# Patient Record
Sex: FEMALE | Race: OTHER | Employment: UNEMPLOYED | URBAN - NONMETROPOLITAN AREA
[De-identification: names, ages, dates, MRNs, and addresses within clinical notes are randomized per-mention and may not be internally consistent; named-entity substitution may affect disease eponyms.]

---

## 2024-10-23 ENCOUNTER — APPOINTMENT (OUTPATIENT)
Dept: CT IMAGING | Age: 54
DRG: 100 | End: 2024-10-23
Payer: COMMERCIAL

## 2024-10-23 ENCOUNTER — APPOINTMENT (OUTPATIENT)
Dept: GENERAL RADIOLOGY | Age: 54
DRG: 100 | End: 2024-10-23
Payer: COMMERCIAL

## 2024-10-23 ENCOUNTER — HOSPITAL ENCOUNTER (INPATIENT)
Age: 54
LOS: 9 days | Discharge: ANOTHER HOSPITAL | DRG: 100 | End: 2024-11-01
Attending: EMERGENCY MEDICINE | Admitting: INTERNAL MEDICINE
Payer: COMMERCIAL

## 2024-10-23 DIAGNOSIS — R09.2 RESPIRATORY ARREST (HCC): ICD-10-CM

## 2024-10-23 DIAGNOSIS — N18.6 ESRF (END STAGE RENAL FAILURE) (HCC): Primary | ICD-10-CM

## 2024-10-23 LAB
ALBUMIN SERPL BCG-MCNC: 2.9 G/DL (ref 3.5–5.1)
ALP SERPL-CCNC: 168 U/L (ref 38–126)
ALT SERPL W/O P-5'-P-CCNC: 20 U/L (ref 11–66)
ANION GAP SERPL CALC-SCNC: 15 MEQ/L (ref 8–16)
APTT PPP: 30.4 SECONDS (ref 22–38)
AST SERPL-CCNC: 34 U/L (ref 5–40)
BASOPHILS ABSOLUTE: 0.1 THOU/MM3 (ref 0–0.1)
BASOPHILS NFR BLD AUTO: 0.7 %
BILIRUB CONJ SERPL-MCNC: < 0.1 MG/DL (ref 0.1–13.8)
BILIRUB SERPL-MCNC: 0.3 MG/DL (ref 0.3–1.2)
BUN SERPL-MCNC: 78 MG/DL (ref 7–22)
CA-I BLD ISE-SCNC: 1.1 MMOL/L (ref 1.12–1.32)
CALCIUM SERPL-MCNC: 8.1 MG/DL (ref 8.5–10.5)
CHLORIDE SERPL-SCNC: 104 MEQ/L (ref 98–111)
CO2 SERPL-SCNC: 22 MEQ/L (ref 23–33)
CREAT SERPL-MCNC: 4.2 MG/DL (ref 0.4–1.2)
DEPRECATED RDW RBC AUTO: 51.5 FL (ref 35–45)
EOSINOPHIL NFR BLD AUTO: 4.2 %
EOSINOPHILS ABSOLUTE: 0.3 THOU/MM3 (ref 0–0.4)
ERYTHROCYTE [DISTWIDTH] IN BLOOD BY AUTOMATED COUNT: 15.9 % (ref 11.5–14.5)
GFR SERPL CREATININE-BSD FRML MDRD: 12 ML/MIN/1.73M2
GLUCOSE BLD STRIP.AUTO-MCNC: 89 MG/DL (ref 70–108)
GLUCOSE SERPL-MCNC: 215 MG/DL (ref 70–108)
HCT VFR BLD AUTO: 29.2 % (ref 37–47)
HGB BLD-MCNC: 9 GM/DL (ref 12–16)
IMM GRANULOCYTES # BLD AUTO: 0.02 THOU/MM3 (ref 0–0.07)
IMM GRANULOCYTES NFR BLD AUTO: 0.3 %
INR PPP: 1.46 (ref 0.85–1.13)
LACTATE SERPL-SCNC: 0.9 MMOL/L (ref 0.5–2)
LYMPHOCYTES ABSOLUTE: 1.6 THOU/MM3 (ref 1–4.8)
LYMPHOCYTES NFR BLD AUTO: 20.2 %
MAGNESIUM SERPL-MCNC: 1.9 MG/DL (ref 1.6–2.4)
MCH RBC QN AUTO: 27.4 PG (ref 26–33)
MCHC RBC AUTO-ENTMCNC: 30.8 GM/DL (ref 32.2–35.5)
MCV RBC AUTO: 88.8 FL (ref 81–99)
MONOCYTES ABSOLUTE: 0.8 THOU/MM3 (ref 0.4–1.3)
MONOCYTES NFR BLD AUTO: 11 %
NEUTROPHILS ABSOLUTE: 4.9 THOU/MM3 (ref 1.8–7.7)
NEUTROPHILS NFR BLD AUTO: 63.6 %
NRBC BLD AUTO-RTO: 0 /100 WBC
OSMOLALITY SERPL CALC.SUM OF ELEC: 311.1 MOSMOL/KG (ref 275–300)
OSMOLALITY SERPL: NORMAL MOSMOL/KG (ref 275–295)
PLATELET # BLD AUTO: 298 THOU/MM3 (ref 130–400)
PMV BLD AUTO: 10.5 FL (ref 9.4–12.4)
POTASSIUM SERPL-SCNC: 4.5 MEQ/L (ref 3.5–5.2)
PROT SERPL-MCNC: 6.3 G/DL (ref 6.1–8)
RBC # BLD AUTO: 3.29 MILL/MM3 (ref 4.2–5.4)
SODIUM SERPL-SCNC: 141 MEQ/L (ref 135–145)
TROPONIN, HIGH SENSITIVITY: 175 NG/L (ref 0–12)
WBC # BLD AUTO: 7.7 THOU/MM3 (ref 4.8–10.8)

## 2024-10-23 PROCEDURE — 6360000002 HC RX W HCPCS

## 2024-10-23 PROCEDURE — 99254 IP/OBS CNSLTJ NEW/EST MOD 60: CPT | Performed by: INTERNAL MEDICINE

## 2024-10-23 PROCEDURE — 80048 BASIC METABOLIC PNL TOTAL CA: CPT

## 2024-10-23 PROCEDURE — 5A1D70Z PERFORMANCE OF URINARY FILTRATION, INTERMITTENT, LESS THAN 6 HOURS PER DAY: ICD-10-PCS

## 2024-10-23 PROCEDURE — 36415 COLL VENOUS BLD VENIPUNCTURE: CPT

## 2024-10-23 PROCEDURE — 85610 PROTHROMBIN TIME: CPT

## 2024-10-23 PROCEDURE — 90935 HEMODIALYSIS ONE EVALUATION: CPT

## 2024-10-23 PROCEDURE — XX20X89 MONITORING OF BRAIN ELECTRICAL ACTIVITY, COMPUTER-AIDED DETECTION AND NOTIFICATION, NEW TECHNOLOGY GROUP 9: ICD-10-PCS

## 2024-10-23 PROCEDURE — 84484 ASSAY OF TROPONIN QUANT: CPT

## 2024-10-23 PROCEDURE — 94002 VENT MGMT INPAT INIT DAY: CPT

## 2024-10-23 PROCEDURE — 83605 ASSAY OF LACTIC ACID: CPT

## 2024-10-23 PROCEDURE — 6370000000 HC RX 637 (ALT 250 FOR IP)

## 2024-10-23 PROCEDURE — 99285 EMERGENCY DEPT VISIT HI MDM: CPT

## 2024-10-23 PROCEDURE — 2000000000 HC ICU R&B

## 2024-10-23 PROCEDURE — 31500 INSERT EMERGENCY AIRWAY: CPT | Performed by: NURSE PRACTITIONER

## 2024-10-23 PROCEDURE — 71045 X-RAY EXAM CHEST 1 VIEW: CPT

## 2024-10-23 PROCEDURE — 82330 ASSAY OF CALCIUM: CPT

## 2024-10-23 PROCEDURE — 0BH17EZ INSERTION OF ENDOTRACHEAL AIRWAY INTO TRACHEA, VIA NATURAL OR ARTIFICIAL OPENING: ICD-10-PCS | Performed by: INTERNAL MEDICINE

## 2024-10-23 PROCEDURE — 87641 MR-STAPH DNA AMP PROBE: CPT

## 2024-10-23 PROCEDURE — 99291 CRITICAL CARE FIRST HOUR: CPT | Performed by: INTERNAL MEDICINE

## 2024-10-23 PROCEDURE — 6360000002 HC RX W HCPCS: Performed by: NURSE PRACTITIONER

## 2024-10-23 PROCEDURE — 80076 HEPATIC FUNCTION PANEL: CPT

## 2024-10-23 PROCEDURE — 83735 ASSAY OF MAGNESIUM: CPT

## 2024-10-23 PROCEDURE — 85025 COMPLETE CBC W/AUTO DIFF WBC: CPT

## 2024-10-23 PROCEDURE — 83930 ASSAY OF BLOOD OSMOLALITY: CPT

## 2024-10-23 PROCEDURE — 85730 THROMBOPLASTIN TIME PARTIAL: CPT

## 2024-10-23 PROCEDURE — 70450 CT HEAD/BRAIN W/O DYE: CPT

## 2024-10-23 PROCEDURE — 82948 REAGENT STRIP/BLOOD GLUCOSE: CPT

## 2024-10-23 PROCEDURE — 5A1945Z RESPIRATORY VENTILATION, 24-96 CONSECUTIVE HOURS: ICD-10-PCS | Performed by: INTERNAL MEDICINE

## 2024-10-23 PROCEDURE — 2580000003 HC RX 258: Performed by: NURSE PRACTITIONER

## 2024-10-23 RX ORDER — PROMETHAZINE HYDROCHLORIDE 25 MG/1
12.5 TABLET ORAL EVERY 6 HOURS PRN
Status: DISCONTINUED | OUTPATIENT
Start: 2024-10-23 | End: 2024-11-01 | Stop reason: HOSPADM

## 2024-10-23 RX ORDER — ATORVASTATIN CALCIUM 40 MG/1
40 TABLET, FILM COATED ORAL EVERY EVENING
Status: DISCONTINUED | OUTPATIENT
Start: 2024-10-23 | End: 2024-11-01 | Stop reason: HOSPADM

## 2024-10-23 RX ORDER — ACETAMINOPHEN 325 MG/1
650 TABLET ORAL EVERY 6 HOURS PRN
Status: DISCONTINUED | OUTPATIENT
Start: 2024-10-23 | End: 2024-11-01 | Stop reason: HOSPADM

## 2024-10-23 RX ORDER — SODIUM CHLORIDE 9 MG/ML
INJECTION, SOLUTION INTRAVENOUS PRN
Status: DISCONTINUED | OUTPATIENT
Start: 2024-10-23 | End: 2024-11-01 | Stop reason: HOSPADM

## 2024-10-23 RX ORDER — METOLAZONE 2.5 MG/1
2.5 TABLET ORAL DAILY
Status: DISCONTINUED | OUTPATIENT
Start: 2024-10-23 | End: 2024-11-01 | Stop reason: HOSPADM

## 2024-10-23 RX ORDER — ASPIRIN 81 MG/1
81 TABLET ORAL DAILY
Status: DISCONTINUED | OUTPATIENT
Start: 2024-10-23 | End: 2024-11-01 | Stop reason: HOSPADM

## 2024-10-23 RX ORDER — SODIUM CHLORIDE 0.9 % (FLUSH) 0.9 %
5-40 SYRINGE (ML) INJECTION EVERY 12 HOURS SCHEDULED
Status: DISCONTINUED | OUTPATIENT
Start: 2024-10-23 | End: 2024-11-01 | Stop reason: HOSPADM

## 2024-10-23 RX ORDER — HYDRALAZINE HYDROCHLORIDE 20 MG/ML
5 INJECTION INTRAMUSCULAR; INTRAVENOUS EVERY 6 HOURS PRN
Status: DISCONTINUED | OUTPATIENT
Start: 2024-10-23 | End: 2024-10-28

## 2024-10-23 RX ORDER — BUMETANIDE 1 MG/1
1 TABLET ORAL DAILY
Status: DISCONTINUED | OUTPATIENT
Start: 2024-10-23 | End: 2024-10-24

## 2024-10-23 RX ORDER — ACETAMINOPHEN 650 MG/1
650 SUPPOSITORY RECTAL EVERY 6 HOURS PRN
Status: DISCONTINUED | OUTPATIENT
Start: 2024-10-23 | End: 2024-11-01 | Stop reason: HOSPADM

## 2024-10-23 RX ORDER — POLYETHYLENE GLYCOL 3350 17 G/17G
17 POWDER, FOR SOLUTION ORAL DAILY PRN
Status: DISCONTINUED | OUTPATIENT
Start: 2024-10-23 | End: 2024-11-01 | Stop reason: HOSPADM

## 2024-10-23 RX ORDER — SODIUM CHLORIDE 0.9 % (FLUSH) 0.9 %
5-40 SYRINGE (ML) INJECTION PRN
Status: DISCONTINUED | OUTPATIENT
Start: 2024-10-23 | End: 2024-11-01 | Stop reason: HOSPADM

## 2024-10-23 RX ORDER — CLOPIDOGREL BISULFATE 75 MG/1
75 TABLET ORAL DAILY
Status: DISCONTINUED | OUTPATIENT
Start: 2024-10-23 | End: 2024-11-01 | Stop reason: HOSPADM

## 2024-10-23 RX ORDER — GLUCAGON 1 MG/ML
1 KIT INJECTION PRN
Status: DISCONTINUED | OUTPATIENT
Start: 2024-10-23 | End: 2024-11-01 | Stop reason: HOSPADM

## 2024-10-23 RX ORDER — SEVELAMER CARBONATE 800 MG/1
800 TABLET, FILM COATED ORAL
Status: DISCONTINUED | OUTPATIENT
Start: 2024-10-23 | End: 2024-11-01 | Stop reason: HOSPADM

## 2024-10-23 RX ORDER — LOSARTAN POTASSIUM 100 MG/1
100 TABLET ORAL DAILY
Status: DISCONTINUED | OUTPATIENT
Start: 2024-10-23 | End: 2024-11-01 | Stop reason: HOSPADM

## 2024-10-23 RX ORDER — ONDANSETRON 2 MG/ML
4 INJECTION INTRAMUSCULAR; INTRAVENOUS EVERY 6 HOURS PRN
Status: DISCONTINUED | OUTPATIENT
Start: 2024-10-23 | End: 2024-11-01 | Stop reason: HOSPADM

## 2024-10-23 RX ORDER — INSULIN LISPRO 100 [IU]/ML
0-4 INJECTION, SOLUTION INTRAVENOUS; SUBCUTANEOUS
Status: DISCONTINUED | OUTPATIENT
Start: 2024-10-23 | End: 2024-10-24 | Stop reason: DRUGHIGH

## 2024-10-23 RX ORDER — DEXTROSE MONOHYDRATE 100 MG/ML
INJECTION, SOLUTION INTRAVENOUS CONTINUOUS PRN
Status: DISCONTINUED | OUTPATIENT
Start: 2024-10-23 | End: 2024-10-24 | Stop reason: SDUPTHER

## 2024-10-23 RX ORDER — CARVEDILOL 25 MG/1
25 TABLET ORAL 2 TIMES DAILY WITH MEALS
Status: DISCONTINUED | OUTPATIENT
Start: 2024-10-23 | End: 2024-10-29

## 2024-10-23 RX ORDER — FOLIC ACID 1 MG/1
1 TABLET ORAL DAILY
Status: DISCONTINUED | OUTPATIENT
Start: 2024-10-23 | End: 2024-11-01 | Stop reason: HOSPADM

## 2024-10-23 RX ORDER — HYDRALAZINE HYDROCHLORIDE 25 MG/1
25 TABLET, FILM COATED ORAL 3 TIMES DAILY
Status: DISCONTINUED | OUTPATIENT
Start: 2024-10-23 | End: 2024-10-28

## 2024-10-23 RX ORDER — PROPOFOL 10 MG/ML
5-50 INJECTION, EMULSION INTRAVENOUS CONTINUOUS
Status: DISCONTINUED | OUTPATIENT
Start: 2024-10-23 | End: 2024-10-26

## 2024-10-23 RX ADMIN — SODIUM CHLORIDE 5 MG/HR: 9 INJECTION, SOLUTION INTRAVENOUS at 17:04

## 2024-10-23 RX ADMIN — BUMETANIDE 1 MG: 1 TABLET ORAL at 21:56

## 2024-10-23 RX ADMIN — PROPOFOL 45 MCG/KG/MIN: 10 INJECTION, EMULSION INTRAVENOUS at 19:18

## 2024-10-23 RX ADMIN — PROPOFOL 50 MCG/KG/MIN: 10 INJECTION, EMULSION INTRAVENOUS at 16:37

## 2024-10-23 RX ADMIN — ATORVASTATIN CALCIUM 40 MG: 40 TABLET, FILM COATED ORAL at 21:57

## 2024-10-23 RX ADMIN — PROPOFOL 50 MCG/KG/MIN: 10 INJECTION, EMULSION INTRAVENOUS at 22:58

## 2024-10-23 ASSESSMENT — PAIN - FUNCTIONAL ASSESSMENT: PAIN_FUNCTIONAL_ASSESSMENT: NONE - DENIES PAIN

## 2024-10-23 ASSESSMENT — PULMONARY FUNCTION TESTS: PIF_VALUE: 20

## 2024-10-23 NOTE — ED PROVIDER NOTES
acetaminophen (TYLENOL) suppository 650 mg (has no administration in time range)   promethazine (PHENERGAN) tablet 12.5 mg (has no administration in time range)     Or   ondansetron (ZOFRAN) injection 4 mg (has no administration in time range)   apixaban (ELIQUIS) tablet 5 mg ( Oral Automatically Held 10/26/24 2100)   aspirin EC tablet 81 mg ( Oral Automatically Held 10/26/24 0900)   atorvastatin (LIPITOR) tablet 40 mg (has no administration in time range)   bumetanide (BUMEX) tablet 1 mg (has no administration in time range)   carvedilol (COREG) tablet 25 mg ( Oral Automatically Held 10/26/24 1700)   clopidogrel (PLAVIX) tablet 75 mg ( Oral Automatically Held 10/26/24 0900)   folic acid (FOLVITE) tablet 1 mg ( Oral Automatically Held 10/26/24 0900)   hydrALAZINE (APRESOLINE) tablet 25 mg ( Oral Automatically Held 10/26/24 2100)   losartan (COZAAR) tablet 100 mg ( Oral Automatically Held 10/26/24 0900)   metOLazone (ZAROXOLYN) tablet 2.5 mg ( Oral Automatically Held 10/26/24 0900)   sertraline (ZOLOFT) tablet 50 mg ( Oral Automatically Held 10/26/24 1800)   sevelamer (RENVELA) tablet 800 mg ( Oral Automatically Held 10/26/24 1700)   insulin lispro (HUMALOG,ADMELOG) injection vial 0-4 Units (has no administration in time range)   glucose chewable tablet 16 g (has no administration in time range)   dextrose bolus 10% 125 mL (has no administration in time range)     Or   dextrose bolus 10% 250 mL (has no administration in time range)   glucagon injection 1 mg (has no administration in time range)   dextrose 10 % infusion (has no administration in time range)   hydrALAZINE (APRESOLINE) injection 5 mg (has no administration in time range)     1630:  Patient was a rapid response upstairs in the dialysis unit.  She apparently had a seizure and then bit her tongue which bled profusely.  Patient was resuscitated and admitted to the ICU, and did not return to the emergency room.  She was admitted to the hospital from the

## 2024-10-23 NOTE — FLOWSHEET NOTE
10/23/24 1545   Vital Signs   BP (!) 197/90   Pulse 64   Post-Hemodialysis Assessment   Post-Treatment Procedures Blood returned;Catheter Capped, clamped with Saline x2 ports   Machine Disinfection Process Acid/Vinegar Clean;Heat Disinfect;Exterior Machine Disinfection   Rinseback Volume (ml) 200 ml   Blood Volume Processed (Liters) 31.3 L   Dialyzer Clearance Lightly streaked   Duration of Treatment (minutes) 120 minutes   Heparin Amount Administered During Treatment (mL) 0 mL   Hemodialysis Intake (ml) 200 ml   Hemodialysis Output (ml) 1850 ml   NET Removed (ml) 1650     rapid response called 2 hours into 3.5 hour treatment due to seizure, pt intubated and brought to icu per rapid team. Dr. Osborn informed. venous blood was not able to be returned, during seizure it would not return. HD catheter ports flushed, clamped and capped. Dressing clean, dry and intact. Report given to primary RN. Treatment record printed for scanning into EMR.

## 2024-10-23 NOTE — PROCEDURES
PROCEDURE NOTE  Date: 10/23/2024   Name: Shahrzad Gregory  YOB: 1970    Intubation    Date/Time: 10/23/2024 6:24 PM    Performed by: Darion Orantes DO  Authorized by: Darion Orantes DO  Consent: The procedure was performed in an emergent situation. Verbal consent not obtained. Written consent not obtained.  Patient identity confirmed: verbally with patient and arm band  Indications: airway protection, respiratory failure and hypoxemia  Intubation method: video-assisted  Patient status: paralyzed (RSI)  Preoxygenation: BVM  Pretreatment medications: midazolam  Sedatives: etomidate  Paralytic: rocuronium  Laryngoscope size: Mac 3  Tube size: 7.0 mm  Tube type: cuffed  Number of attempts: 3  Ventilation between attempts: BVM  Cricoid pressure: no  Cords visualized: yes  Post-procedure assessment: chest rise and ETCO2 monitor  Breath sounds: equal  Cuff inflated: yes  ETT to lip: 25 cm  Tube secured with: ETT campos  Chest x-ray interpreted by me and radiologist.  Chest x-ray findings: endotracheal tube too low  Tube repositioned: tube repositioned successfully  Comments:   Procedure was performed emergently during a rapid response situation at inpatient dialysis.  The intubation was performed with More Hayes CNP present, and assisting during the intubation. The first attempt visualized cords well, however stylet had inappropriate curvature. Stylet was exchanged. Subsequent attempts were hindered by bloody secretions obscuring the view of the larynx and epiglottis. In addition, the patient intermittently was biting on the glidescope, further hindering the second attempt. On the third attempt, the ET tube was placed by More. It was 25 cm at the lip. Stat CXR showed placement approx 2cm from the angus. The ET tube was repositioned to 22 cm at the lip, with repositioning verified by additional CXR.    Electronically signed by Darion Orantes DO IM PGY-2 on 10/23/2024.  This procedure was

## 2024-10-23 NOTE — CONSULTS
propofol 50 mcg/kg/min (10/23/24 1637)    niCARdipine 5 mg/hr (10/23/24 1704)     Meds:    epoetin george-epbx  6,000 Units IntraVENous Once in dialysis     Meds prn:      Allergies/Intolerances:  ALLERGIES: Patient has no known allergies.    24HR INTAKE/OUTPUT:    Intake/Output Summary (Last 24 hours) at 10/23/2024 1750  Last data filed at 10/23/2024 1545  Gross per 24 hour   Intake 200 ml   Output 1850 ml   Net -1650 ml     No intake/output data recorded.  I/O this shift:  In: 200   Out: 1850   Admission weight: 85.8 kg (189 lb 2.5 oz)  Wt Readings from Last 3 Encounters:   10/23/24 85.8 kg (189 lb 2.5 oz)   10/21/24 86.7 kg (191 lb 2.2 oz)   10/18/24 85 kg (187 lb 6.3 oz)     Body mass index is 34.6 kg/m².    Physical Examination:  VITALS:   Vitals:    10/23/24 1645 10/23/24 1700 10/23/24 1715 10/23/24 1730   BP: (!) 191/60 (!) 181/60 (!) 178/59 (!) 163/58   Pulse: 56 55 55 55   Resp: 16 13 15 15   Temp:       TempSrc:       SpO2: 100% 100% 100% 100%   Weight:         Weight:   Wt Readings from Last 3 Encounters:   10/23/24 85.8 kg (189 lb 2.5 oz)   10/21/24 86.7 kg (191 lb 2.2 oz)   10/18/24 85 kg (187 lb 6.3 oz)     Constitutional and General Appearance: Intubated and sedated  Eyes: no icteric sclera in left eye or right eye,  no pallor conjunctiva in left or right eye  Ears and Nose:  No active drainage from nose.   Oral: + ET tube  Neck: No jugular venous distention  Lungs: Air entry B/L, on mechanical ventilator  Heart: regular rate, S1, S2  Extremities: + LE edema, no tenderness  GI: soft  Skin: warm to touch  Neuro: Sedated    Lab Data  CBC:   Recent Labs     10/21/24  0647 10/21/24  1136 10/23/24  1254   WBC 6.9  --  7.7   HGB 8.3* 8.0* 9.0*   HCT 27.1* 25.9* 29.2*     --  298     BMP:  Recent Labs     10/20/24  1809 10/21/24  0647 10/23/24  1254    138 141   K 4.4 4.6 4.5   CL 99 99 104   CO2 19* 21* 22*   BUN 96* 102* 78*   CREATININE 5.1* 5.4* 4.2*   GLUCOSE 258* 40* 215*   CALCIUM 7.3*  7.6* 8.1*     Hepatic: No results for input(s): \"LABALBU\", \"AST\", \"ALT\", \"BILITOT\", \"ALKPHOS\" in the last 72 hours.    Invalid input(s): \"ALB\"        Diagnostics:  EF 30%    Chest x-ray shows cardiomegaly and pleural effusion      Impression and Plan:  ESRD on hemodialysis  Patient underwent 2 hours of dialysis treatment but was terminated due to seizure activity  Her BUN was 78 and infact much better than previous levels.  Seizure activity not related to dialysis or DDS  Reevaluate in a.m. and possibly plan for dialysis treatment tomorrow  BMP daily  Electrolytes.  Stable  Acid-base.  Overall stable.  Has mild metabolic acidosis  Seizure.  Anemia in ESRD  Hypertension.  Currently on Cardene drip  Carotid stenosis  History of CVA  Discussed with patient's son at bedside    Thank you for the consult. Please feel free to call me if you have any questions.     Masoud Osborn MD  [facility]    This report has been created using voice recognition software. It may contain minor errors which are inherent in voice recognition technology.

## 2024-10-23 NOTE — PROGRESS NOTES
Patient arrived to unit from dialysis via bed. Patient transferred to ICU bed and placed on continuous ICU bedside monitor. Patient admitted for Respiratory arrest (HCC) [R09.2]. Vitals obtained. See flowsheets. Patient's IV access includes tunneled dialysis catheter right chest. Current infusions and rates of infusion include propofol @ 20 mcg/kg/min. Assessment completed by MARGUERITE Starr RN. Two nurse skin assessment completed by MARGUERITE Starr RN and ANTWON Todd RN. See flowsheets for assessment details. Policies and procedures of ICU unable to be explained to patient at this time. Family member(s)/representative(s) present at time of admission include NA. Patient rights explained to family member(s)/representatives and patient, as able. Patient/patient's family member(s)/representative(s) N/A to have physician notified of their admission. All questions posed by patient's family member(s)/representative(s) and patient answered at this time.

## 2024-10-23 NOTE — ED TRIAGE NOTES
[employer] services used for translation.Presents to ER from inpatient dialysis. Pt was to have dialysis M,W,F continues while she is here from [place]. There was no communication from dialysis in [place] to [place] to continue care. Pt had dialysis Monday.  Family reports pt needs dialysis today due to confusion. Reports when she does not have dialysis she becomes confused. This RN to monitor

## 2024-10-23 NOTE — PROGRESS NOTES
CRITICAL CARE CONSULT NOTE    Patient:   Shahrzad Gregory    Unit/Bed: 4D-03/003-A  YOB: 1970  MRN:  [id number]   PCP:  Trang Maciel DO  Date of Admission:  10/23/2024    Assessment and Plan:   Altered mental status, questionable seizure, questionable uremic encephalopathy: S/p emergent intubation 0/23/2024 for reported seizure partway through dialysis session. No seizure upon ICU arrival to [facility], however patient had a laceration on tongue with blood around lips/mouth. No history of seizures.  Little information was able to be obtained regarding seizure- unknown duration, unknown if focal or generalized.  Will obtain CT head, and monitor Cerebell tonight.  Seizure precautions.  Sedation with propofol.  Will defer any medical management of seizure until more information supporting this diagnosis can be generated or obtained.  Patient is responsive when sedation is weaned.  ESRD: Nephrology is consulted.  Patient has no insurance, living in [facility] temporarily as son is contract worker here.  She goes to dialysis in [place].  No continuity established from [place] dialysis facility to local facilities here.  S/p 2 hours dialysis 10/23, prior to rapid being called.  Monitor BMP.  Fluids, electrolytes per nephrology.  She frequently comes into the ED for dialysis needs.  Metabolic acidosis: Bicarb 22.  Continue to monitor BMP.  Anion gap 15, approximately at her baseline.  Lactic acid pending.  Coronary artery disease: S/p CABG.  On aspirin, Plavix (and eliquis) per EMR.  Pharmacy med rec.  Paroxysmal atrial fibrillation: Recently started Eliquis.  [id number] 5.  Rate control with carvedilol.  Type II diabetes mellitus: Last A1c 9/2024 6.5.  On glipizide outpatient.  Will begin low-dose SSI with HGP, POC BG  History of multiple CVAs, left internal carotid artery stenosis: Hold Plavix until repeat hemoglobin shows stability.  Hold Lipitor due to n.p.o. status.  Anemia, chronic,  on mechanical ventilator.  Italicized bold font, if present,  represents changes to the note made by me.  CC time 35 minutes.  Time was discontiguous. Time does not include procedure. Time does include my direct assessment of the patient and coordination of care.  Time represents more than 50% of the time involved with patient care by the CC team.  Electronically signed by Bennie Helms MD.

## 2024-10-24 ENCOUNTER — APPOINTMENT (OUTPATIENT)
Dept: MRI IMAGING | Age: 54
DRG: 100 | End: 2024-10-24
Payer: COMMERCIAL

## 2024-10-24 LAB
ANION GAP SERPL CALC-SCNC: 14 MEQ/L (ref 8–16)
BUN SERPL-MCNC: 50 MG/DL (ref 7–22)
CALCIUM SERPL-MCNC: 8.2 MG/DL (ref 8.5–10.5)
CHLORIDE SERPL-SCNC: 100 MEQ/L (ref 98–111)
CO2 SERPL-SCNC: 23 MEQ/L (ref 23–33)
CREAT SERPL-MCNC: 3.4 MG/DL (ref 0.4–1.2)
DEPRECATED RDW RBC AUTO: 50.4 FL (ref 35–45)
ERYTHROCYTE [DISTWIDTH] IN BLOOD BY AUTOMATED COUNT: 15.7 % (ref 11.5–14.5)
GFR SERPL CREATININE-BSD FRML MDRD: 16 ML/MIN/1.73M2
GLUCOSE BLD STRIP.AUTO-MCNC: 115 MG/DL (ref 70–108)
GLUCOSE BLD STRIP.AUTO-MCNC: 37 MG/DL (ref 70–108)
GLUCOSE BLD STRIP.AUTO-MCNC: 38 MG/DL (ref 70–108)
GLUCOSE BLD STRIP.AUTO-MCNC: 43 MG/DL (ref 70–108)
GLUCOSE BLD STRIP.AUTO-MCNC: 46 MG/DL (ref 70–108)
GLUCOSE BLD STRIP.AUTO-MCNC: 51 MG/DL (ref 70–108)
GLUCOSE BLD STRIP.AUTO-MCNC: 52 MG/DL (ref 70–108)
GLUCOSE BLD STRIP.AUTO-MCNC: 53 MG/DL (ref 70–108)
GLUCOSE BLD STRIP.AUTO-MCNC: 55 MG/DL (ref 70–108)
GLUCOSE BLD STRIP.AUTO-MCNC: 56 MG/DL (ref 70–108)
GLUCOSE BLD STRIP.AUTO-MCNC: 60 MG/DL (ref 70–108)
GLUCOSE BLD STRIP.AUTO-MCNC: 66 MG/DL (ref 70–108)
GLUCOSE BLD STRIP.AUTO-MCNC: 71 MG/DL (ref 70–108)
GLUCOSE BLD STRIP.AUTO-MCNC: 73 MG/DL (ref 70–108)
GLUCOSE BLD STRIP.AUTO-MCNC: 77 MG/DL (ref 70–108)
GLUCOSE BLD STRIP.AUTO-MCNC: 81 MG/DL (ref 70–108)
GLUCOSE BLD STRIP.AUTO-MCNC: 84 MG/DL (ref 70–108)
GLUCOSE SERPL-MCNC: 34 MG/DL (ref 70–108)
HCT VFR BLD AUTO: 28.2 % (ref 37–47)
HGB BLD-MCNC: 8.6 GM/DL (ref 12–16)
MAGNESIUM SERPL-MCNC: 1.7 MG/DL (ref 1.6–2.4)
MCH RBC QN AUTO: 27 PG (ref 26–33)
MCHC RBC AUTO-ENTMCNC: 30.5 GM/DL (ref 32.2–35.5)
MCV RBC AUTO: 88.4 FL (ref 81–99)
MRSA DNA SPEC QL NAA+PROBE: NEGATIVE
ORIGINAL SAMPLE NUMBER: NORMAL
PHOSPHATE SERPL-MCNC: 4.5 MG/DL (ref 2.4–4.7)
PLATELET # BLD AUTO: 264 THOU/MM3 (ref 130–400)
PMV BLD AUTO: 11 FL (ref 9.4–12.4)
POTASSIUM SERPL-SCNC: 3.5 MEQ/L (ref 3.5–5.2)
RBC # BLD AUTO: 3.19 MILL/MM3 (ref 4.2–5.4)
REFERENCE LOCATION: NORMAL
REFERENCE RANGE: NORMAL
SODIUM SERPL-SCNC: 137 MEQ/L (ref 135–145)
TEST RESULTS WITH UNITS: 332
TEST(S) BEING PERFORMED: NORMAL
WBC # BLD AUTO: 10.4 THOU/MM3 (ref 4.8–10.8)

## 2024-10-24 PROCEDURE — 99232 SBSQ HOSP IP/OBS MODERATE 35: CPT | Performed by: INTERNAL MEDICINE

## 2024-10-24 PROCEDURE — 95717 EEG PHYS/QHP 2-12 HR W/O VID: CPT | Performed by: PSYCHIATRY & NEUROLOGY

## 2024-10-24 PROCEDURE — 80048 BASIC METABOLIC PNL TOTAL CA: CPT

## 2024-10-24 PROCEDURE — 2720000010 HC SURG SUPPLY STERILE

## 2024-10-24 PROCEDURE — 95714 VEEG EA 12-26 HR UNMNTR: CPT

## 2024-10-24 PROCEDURE — 2580000003 HC RX 258

## 2024-10-24 PROCEDURE — 6370000000 HC RX 637 (ALT 250 FOR IP)

## 2024-10-24 PROCEDURE — 94003 VENT MGMT INPAT SUBQ DAY: CPT

## 2024-10-24 PROCEDURE — 6370000000 HC RX 637 (ALT 250 FOR IP): Performed by: STUDENT IN AN ORGANIZED HEALTH CARE EDUCATION/TRAINING PROGRAM

## 2024-10-24 PROCEDURE — 6360000002 HC RX W HCPCS

## 2024-10-24 PROCEDURE — 95705 EEG W/O VID 2-12 HR UNMNTR: CPT

## 2024-10-24 PROCEDURE — 2000000000 HC ICU R&B

## 2024-10-24 PROCEDURE — 83735 ASSAY OF MAGNESIUM: CPT

## 2024-10-24 PROCEDURE — 2580000003 HC RX 258: Performed by: STUDENT IN AN ORGANIZED HEALTH CARE EDUCATION/TRAINING PROGRAM

## 2024-10-24 PROCEDURE — 99291 CRITICAL CARE FIRST HOUR: CPT | Performed by: INTERNAL MEDICINE

## 2024-10-24 PROCEDURE — 36415 COLL VENOUS BLD VENIPUNCTURE: CPT

## 2024-10-24 PROCEDURE — 82948 REAGENT STRIP/BLOOD GLUCOSE: CPT

## 2024-10-24 PROCEDURE — 6360000002 HC RX W HCPCS: Performed by: STUDENT IN AN ORGANIZED HEALTH CARE EDUCATION/TRAINING PROGRAM

## 2024-10-24 PROCEDURE — 31500 INSERT EMERGENCY AIRWAY: CPT

## 2024-10-24 PROCEDURE — 85027 COMPLETE CBC AUTOMATED: CPT

## 2024-10-24 PROCEDURE — 95819 EEG AWAKE AND ASLEEP: CPT

## 2024-10-24 PROCEDURE — 84100 ASSAY OF PHOSPHORUS: CPT

## 2024-10-24 PROCEDURE — 6360000002 HC RX W HCPCS: Performed by: INTERNAL MEDICINE

## 2024-10-24 PROCEDURE — 6370000000 HC RX 637 (ALT 250 FOR IP): Performed by: NURSE PRACTITIONER

## 2024-10-24 RX ORDER — CHLORHEXIDINE GLUCONATE ORAL RINSE 1.2 MG/ML
15 SOLUTION DENTAL 2 TIMES DAILY
Status: DISCONTINUED | OUTPATIENT
Start: 2024-10-24 | End: 2024-11-01 | Stop reason: HOSPADM

## 2024-10-24 RX ORDER — DEXTROSE MONOHYDRATE AND SODIUM CHLORIDE 5; .45 G/100ML; G/100ML
INJECTION, SOLUTION INTRAVENOUS CONTINUOUS
Status: DISCONTINUED | OUTPATIENT
Start: 2024-10-24 | End: 2024-10-25

## 2024-10-24 RX ORDER — LEVETIRACETAM 500 MG/5ML
500 INJECTION, SOLUTION, CONCENTRATE INTRAVENOUS EVERY 12 HOURS
Status: DISCONTINUED | OUTPATIENT
Start: 2024-10-24 | End: 2024-10-27 | Stop reason: ALTCHOICE

## 2024-10-24 RX ORDER — COSYNTROPIN 0.25 MG/ML
250 INJECTION, POWDER, FOR SOLUTION INTRAMUSCULAR; INTRAVENOUS ONCE
Status: COMPLETED | OUTPATIENT
Start: 2024-10-25 | End: 2024-10-26

## 2024-10-24 RX ORDER — INSULIN GLARGINE 100 [IU]/ML
8 INJECTION, SOLUTION SUBCUTANEOUS DAILY
Status: DISCONTINUED | OUTPATIENT
Start: 2024-10-24 | End: 2024-10-30

## 2024-10-24 RX ORDER — BUMETANIDE 0.25 MG/ML
1 INJECTION, SOLUTION INTRAMUSCULAR; INTRAVENOUS DAILY
Status: DISCONTINUED | OUTPATIENT
Start: 2024-10-24 | End: 2024-11-01 | Stop reason: HOSPADM

## 2024-10-24 RX ORDER — INSULIN LISPRO 100 [IU]/ML
0-8 INJECTION, SOLUTION INTRAVENOUS; SUBCUTANEOUS
Status: DISCONTINUED | OUTPATIENT
Start: 2024-10-24 | End: 2024-11-01 | Stop reason: HOSPADM

## 2024-10-24 RX ORDER — DEXTROSE MONOHYDRATE 50 MG/ML
INJECTION, SOLUTION INTRAVENOUS CONTINUOUS
Status: DISCONTINUED | OUTPATIENT
Start: 2024-10-24 | End: 2024-10-25

## 2024-10-24 RX ORDER — DEXTROSE MONOHYDRATE 100 MG/ML
INJECTION, SOLUTION INTRAVENOUS CONTINUOUS PRN
Status: DISCONTINUED | OUTPATIENT
Start: 2024-10-24 | End: 2024-11-01 | Stop reason: HOSPADM

## 2024-10-24 RX ADMIN — PROPOFOL 45 MCG/KG/MIN: 10 INJECTION, EMULSION INTRAVENOUS at 18:22

## 2024-10-24 RX ADMIN — EPOETIN ALFA-EPBX 6000 UNITS: 4000 INJECTION, SOLUTION INTRAVENOUS; SUBCUTANEOUS at 16:57

## 2024-10-24 RX ADMIN — DEXTROSE MONOHYDRATE 250 ML: 100 INJECTION, SOLUTION INTRAVENOUS at 04:03

## 2024-10-24 RX ADMIN — PROPOFOL 50 MCG/KG/MIN: 10 INJECTION, EMULSION INTRAVENOUS at 05:47

## 2024-10-24 RX ADMIN — BUMETANIDE 1 MG: 0.25 INJECTION INTRAMUSCULAR; INTRAVENOUS at 09:17

## 2024-10-24 RX ADMIN — SODIUM CHLORIDE, PRESERVATIVE FREE 10 ML: 5 INJECTION INTRAVENOUS at 20:39

## 2024-10-24 RX ADMIN — APIXABAN 5 MG: 5 TABLET, FILM COATED ORAL at 20:39

## 2024-10-24 RX ADMIN — SODIUM CHLORIDE, PRESERVATIVE FREE 10 ML: 5 INJECTION INTRAVENOUS at 09:19

## 2024-10-24 RX ADMIN — SODIUM CHLORIDE, PRESERVATIVE FREE 10 ML: 5 INJECTION INTRAVENOUS at 13:31

## 2024-10-24 RX ADMIN — PROPOFOL 50 MCG/KG/MIN: 10 INJECTION, EMULSION INTRAVENOUS at 09:41

## 2024-10-24 RX ADMIN — DEXTROSE MONOHYDRATE 125 ML: 100 INJECTION, SOLUTION INTRAVENOUS at 06:04

## 2024-10-24 RX ADMIN — SODIUM CHLORIDE, PRESERVATIVE FREE 10 ML: 5 INJECTION INTRAVENOUS at 08:47

## 2024-10-24 RX ADMIN — PROPOFOL 45 MCG/KG/MIN: 10 INJECTION, EMULSION INTRAVENOUS at 22:37

## 2024-10-24 RX ADMIN — DEXTROSE MONOHYDRATE 125 ML: 100 INJECTION, SOLUTION INTRAVENOUS at 18:30

## 2024-10-24 RX ADMIN — DEXTROSE MONOHYDRATE: 100 INJECTION, SOLUTION INTRAVENOUS at 08:45

## 2024-10-24 RX ADMIN — LEVETIRACETAM 500 MG: 100 INJECTION INTRAVENOUS at 13:29

## 2024-10-24 RX ADMIN — PROPOFOL 50 MCG/KG/MIN: 10 INJECTION, EMULSION INTRAVENOUS at 03:18

## 2024-10-24 RX ADMIN — DEXTROSE MONOHYDRATE 125 ML: 100 INJECTION, SOLUTION INTRAVENOUS at 20:41

## 2024-10-24 RX ADMIN — DEXTROSE MONOHYDRATE: 100 INJECTION, SOLUTION INTRAVENOUS at 22:33

## 2024-10-24 RX ADMIN — PROPOFOL 50 MCG/KG/MIN: 10 INJECTION, EMULSION INTRAVENOUS at 14:56

## 2024-10-24 RX ADMIN — DEXTROSE MONOHYDRATE: 50 INJECTION, SOLUTION INTRAVENOUS at 08:54

## 2024-10-24 RX ADMIN — ATORVASTATIN CALCIUM 40 MG: 40 TABLET, FILM COATED ORAL at 20:39

## 2024-10-24 RX ADMIN — DEXTROSE MONOHYDRATE 125 ML: 100 INJECTION, SOLUTION INTRAVENOUS at 18:57

## 2024-10-24 RX ADMIN — CHLORHEXIDINE GLUCONATE, 0.12% ORAL RINSE 15 ML: 1.2 SOLUTION DENTAL at 20:39

## 2024-10-24 RX ADMIN — DEXTROSE MONOHYDRATE 125 ML: 100 INJECTION, SOLUTION INTRAVENOUS at 06:52

## 2024-10-24 ASSESSMENT — PULMONARY FUNCTION TESTS
PIF_VALUE: 18
PIF_VALUE: 19
PIF_VALUE: 18

## 2024-10-24 NOTE — PROGRESS NOTES
Daily    atorvastatin  40 mg Oral QPM    [Held by provider] carvedilol  25 mg Oral BID WC    [Held by provider] clopidogrel  75 mg Oral Daily    [Held by provider] folic acid  1 mg Oral Daily    [Held by provider] hydrALAZINE  25 mg Oral TID    [Held by provider] losartan  100 mg Oral Daily    [Held by provider] metOLazone  2.5 mg Oral Daily    [Held by provider] sertraline  50 mg Oral QPM    [Held by provider] sevelamer  800 mg Oral TID      Meds prn: glucose, dextrose bolus **OR** dextrose bolus, dextrose, sodium chloride flush, sodium chloride, polyethylene glycol, acetaminophen **OR** acetaminophen, promethazine **OR** ondansetron, glucagon (rDNA), hydrALAZINE     Lab Data :  CBC:   Recent Labs     10/21/24  1136 10/23/24  1254 10/24/24  0354   WBC  --  7.7 10.4   HGB 8.0* 9.0* 8.6*   HCT 25.9* 29.2* 28.2*   PLT  --  298 264     CMP:  Recent Labs     10/23/24  1254 10/23/24  2011 10/24/24  0354     --  137   K 4.5  --  3.5     --  100   CO2 22*  --  23   BUN 78*  --  50*   CREATININE 4.2*  --  3.4*   GLUCOSE 215*  --  34*   CALCIUM 8.1*  --  8.2*   MG  --  1.9 1.7   PHOS  --   --  4.5     Hepatic:   Recent Labs     10/23/24  2011   AST 34   ALT 20   BILITOT 0.3   ALKPHOS 168*         Assessment and Plan:  ESRD on HD  Electrolytes stable.  No acute need for renal replacement therapy today.  Oxygenating well on 25% FiO2  Continue dialysis Mondays, Wednesdays and Fridays.  Plan dialysis tomorrow  Mild metabolic acidosis.  Improved  Anemia in ESRD.  Add Retacrit  Seizure  Hypertension  Hypoglycemia.  Being started on dextrose drip per ICU  History of CVA  Diabetes mellitus  Paroxysmal atrial fibrillation  History of noncompliance  Pleural effusion      Discussed with ICU  Masoud Osborn MD  [facility]    This report has been created using voice recognition software. It may contain minor errors which are inherent in voice recognition technology

## 2024-10-24 NOTE — PROGRESS NOTES
[facility]  OCCUPATIONAL THERAPY MISSED TREATMENT NOTE  [facility] ICU 4D  4D-03003-A      Date: 10/24/2024  Patient Name: Shahrzad Gregory        CSN: [id number]   : 1970  (54 y.o.)  Gender: female                REASON FOR MISSED TREATMENT: Hold treatment per nursing request. Per RN pt had a seizure at shift change and is maxed out on propofol. She is not appropriate for early mobility at this time. Will check back next available date as able and pt medically appropriate.

## 2024-10-24 NOTE — PLAN OF CARE
Problem: Chronic Conditions and Co-morbidities  Goal: Patient's chronic conditions and co-morbidity symptoms are monitored and maintained or improved  10/24/2024 1013 by Jocelyn Rasmussen RN  Outcome: Progressing  Flowsheets (Taken 10/23/2024 2100 by Shauna Morris, RN)  Care Plan - Patient's Chronic Conditions and Co-Morbidity Symptoms are Monitored and Maintained or Improved:   Monitor and assess patient's chronic conditions and comorbid symptoms for stability, deterioration, or improvement   Collaborate with multidisciplinary team to address chronic and comorbid conditions and prevent exacerbation or deterioration   Update acute care plan with appropriate goals if chronic or comorbid symptoms are exacerbated and prevent overall improvement and discharge  Note: She has chronic kidney disease but does not follow a normal dialysis schedule.  10/24/2024 0208 by Shauna Morris, RN  Outcome: Not Progressing  Flowsheets (Taken 10/23/2024 2100)  Care Plan - Patient's Chronic Conditions and Co-Morbidity Symptoms are Monitored and Maintained or Improved:   Monitor and assess patient's chronic conditions and comorbid symptoms for stability, deterioration, or improvement   Collaborate with multidisciplinary team to address chronic and comorbid conditions and prevent exacerbation or deterioration   Update acute care plan with appropriate goals if chronic or comorbid symptoms are exacerbated and prevent overall improvement and discharge     Problem: Discharge Planning  Goal: Discharge to home or other facility with appropriate resources  10/24/2024 1013 by Jocelyn Rasmussen, RN  Outcome: Progressing  Flowsheets (Taken 10/23/2024 2100 by Shauna Morris, RN)  Discharge to home or other facility with appropriate resources:   Identify barriers to discharge with patient and caregiver   Arrange for needed discharge resources and transportation as appropriate   Identify discharge learning needs (meds, wound care, etc)   Arrange for

## 2024-10-24 NOTE — PROGRESS NOTES
CRITICAL CARE PROGRESS NOTE      Patient:  Shahrzad Gregory    Unit/Bed:4D-03/003-A  YOB: 1970  MRN: [id number]   PCP: Trang Maciel DO  Date of Admission: 10/23/2024  Chief Complaint:- Altered mental status    Assessment and Plan:    New Onset generalized Seizure:    Patient presented to  [facility] ED for hemodialysis, while 2 hours into dialysis, she had witness seizure activity with altered mental status, Rapid response was called which was later converted to Code blue (though patient did not loose pulse or did not have arrhthymias but needed higher medical attention). During Seizure activity, Patient bit her tongue and was bleeding, Patient was intubated for respiratory protection. Patient was transferred to ICU for further management.    Cerebell this morning (10/24/24) showed 2 generalized seizures lasting approximately 5 to 10 minutes example at 6: 02 and 6:52 am with medium amplitude 1 to 2 Hz rhythmic spike and wave discharges with evaluation of the background from theta frequencies to delta frequencies with an onset and offset.  Patient was started on EEG 24 hours, Patient is already on Propofol, Will start patient on Seizure treatment considering 3 seizures in last 24 hours.  Patient was started on Keppra 500 IV BID  CTH with out acute intracranial finding   Will order MRI without contrast considering patient is ESRD.    ESRD:    Patient is ESRD, from [place], Moved here and has insurance issues, Has been coming to [facility] ED for as needed dialysis.  Nephrology consulted, patient received HD 10/23 (2 hours)  HD per Nephrology  Hypoglycemia   History of diabetes Mellitus  Patient has been having episodes of drop in blood sugars, was given boluses of D10, followed by D5W gtt, will switch to D%W1/2NS  Cosyntropin stim test to rule out adrenal insufficiency  Coronary artery disease: S/p CABG.  On aspirin, Plavix (and eliquis) per EMR.  Pharmacy med rec.  Paroxysmal atrial fibrillation:  team.  Electronically signed by Bennie Helms MD.

## 2024-10-24 NOTE — CONSULTS
Comprehensive Nutrition Assessment    Type and Reason for Visit:  Initial, Consult (TF ordering and management)    Nutrition Recommendations/Plan:   Recommend start Nepro at goal rate of 10 ml/hr (based on current diprivan rate).  Flush one 2.5 oz [id number] BID.  Additional free water flushes per Provider.  NPO per provider.     Malnutrition Assessment:  Malnutrition Status:  Insufficient data (10/24/24 1032)    Context:  Acute Illness     Findings of the 6 clinical characteristics of malnutrition:  Energy Intake:  Unable to assess (pt intubated- no family present. NPO x1 day)  Weight Loss:  Unable to assess (pt with ESRD on hemodialyisis so difficult to asses dry weight)     Body Fat Loss:  No significant body fat loss     Muscle Mass Loss:  No significant muscle mass loss    Fluid Accumulation:  Unable to assess (pt with ESRD on hemodialyisis so difficult to asses dry weight)   Strength:  Not measured    Nutrition Assessment: Pt. nutritionally compromised AEB pt intubated and sedated with need for nutrition support to meet estimated nutritional needs. At risk for further nutrition compromise r/t admit d/t altered mental status s/p emergent intubation on 10/23 for seizure during dialysis, ESRD on Hemodialysis (Nephrology following), metabolic acidosis and underlying medical condition (Hx: CAD s/p CABG, Atrial Fibrillation, DM, Multiple CVA's, Anemia, HTN, CHF).      Nutrition Related Findings:    Pt. Report/Treatments/Miscellaneous: Pt seen this morning with RN and resident present. Pt intubated and sedated on diprivan. Pt is Macedonian speaking. Pt appears nourished on physical exam. Per latisha Ventura to start tube feed today. ESRD on HD - plan dialysis tomorrow.  ml in 24 hours.  GI Status: Last BM x1 on 10/23  Pertinent Labs: POC Glucose 56, Sodium 137, Potassium 3.5, BUN 50, Cr. 3.4, Glucose 34, Phosphorus 4.5  Pertinent Meds: Lipitor, Bumex, Retacrit, Lantus, Humalog, Dextrose 10% at 50 ml/hr,

## 2024-10-24 NOTE — PLAN OF CARE
Problem: Chronic Conditions and Co-morbidities  Goal: Patient's chronic conditions and co-morbidity symptoms are monitored and maintained or improved  Outcome: Not Progressing  Flowsheets (Taken 10/23/2024 2100)  Care Plan - Patient's Chronic Conditions and Co-Morbidity Symptoms are Monitored and Maintained or Improved:   Monitor and assess patient's chronic conditions and comorbid symptoms for stability, deterioration, or improvement   Collaborate with multidisciplinary team to address chronic and comorbid conditions and prevent exacerbation or deterioration   Update acute care plan with appropriate goals if chronic or comorbid symptoms are exacerbated and prevent overall improvement and discharge     Problem: Discharge Planning  Goal: Discharge to home or other facility with appropriate resources  Outcome: Not Progressing  Flowsheets (Taken 10/23/2024 2100)  Discharge to home or other facility with appropriate resources:   Identify barriers to discharge with patient and caregiver   Arrange for needed discharge resources and transportation as appropriate   Identify discharge learning needs (meds, wound care, etc)   Arrange for interpreters to assist at discharge as needed   Refer to discharge planning if patient needs post-hospital services based on physician order or complex needs related to functional status, cognitive ability or social support system     Problem: Pain  Goal: Verbalizes/displays adequate comfort level or baseline comfort level  Outcome: Not Progressing  Flowsheets (Taken 10/23/2024 2100)  Verbalizes/displays adequate comfort level or baseline comfort level:   Consider cultural and social influences on pain and pain management   Assess pain using appropriate pain scale     Problem: Safety - Medical Restraint  Goal: Remains free of injury from restraints (Restraint for Interference with Medical Device)  Description: INTERVENTIONS:  1. Determine that other, less restrictive measures have been  using appropriate pain scale

## 2024-10-24 NOTE — PROGRESS NOTES
[facility]     Neurodiagnostic Laboratory Technician Worksheet      EEG Date: 10/24/2024    Name: Shahrzad Gregory  : 1970  Age: 54 y.o.  Sex: female  MRN: [id number]  CSN: [id number]    Room/Location: [facility]  Ordering Provider: ALEXA Rivers    EEG Number: [id number]    Time In: 1021  Time Out: 1021 (10/25/2024)    Total Treatment Time: 24 hours    Clinical History: HX multiple CVA, possible seizure, went unresponsive in dialysis, Ceribell applied last night, seizure burdan 93% 10/24.     Hand Dominance: Pt intubated    Sedation: Yes propfol   H.V. Completed: No, vent    Photic: Yes   Sleep: Yes   Drowsy: Yes   Sleep Deprived: No   Seizures Observed: No   Mentality: obtunded     Technician: Judy Prajapati 10/24/2024

## 2024-10-24 NOTE — PROGRESS NOTES
Patient Weaning Progress    The patient's vent settings was able to be weaned this shift.      Ventilator settings that were weaned              [] Mode   [x] Pressure support weaned   [x] Fio2 weaned   [] Peep weaned      Spontaneous weaning trial  was not attempted.     Unable to get agreement for goals because no family is present and patient cannot respond.

## 2024-10-24 NOTE — PROGRESS NOTES
Pharmacy Medication History Note    List of current medications patient is taking is complete.    Source of information: Dispense History, [facility]    Changes made to medication list:  Medications removed (include reason, ex. therapy complete or physician discontinued):  Removed Apixaban 5 mg twice daily- duplicate  Removed Aspirin 81 mg chewable daily- duplicate  Removed Atorvastatin 40 mg daily - duplicate    Medications added/doses adjusted:  N/A    Other notes (ex. Recent course of antibiotics, Coumadin dosing):  Apixaban 2.5 mg BID, Aspirin 81 mg and Atorvastatin 40 mg were prescribed on 10/16/24 and sent to [facility] outpatient pharmacy, they have the prescriptions but the medications have not been picked up yet.   Patient had a 90 day supply of Sertaline 50 mg daily filled on 07/1/24 for a 90 day supply, this medication would be calculated to run out on 10/1/24 but patient has had multiple hospital admissions since then so it is possible she still has meds left from this fill.     Electronically signed by Jose Da Silva on 10/24/2024 at 9:02 AM

## 2024-10-24 NOTE — FLOWSHEET NOTE
10/24/24 0838   Treatment Team Notification   Reason for Communication Medication concern   Name of Team Member Notified Dr. Osborn   Treatment Team Role Consulting Provider   Method of Communication Secure Message   Response See orders   Notification Time 0838     She has bumex 1 mg daily. May I switch it from PO to IV? Thanks

## 2024-10-24 NOTE — PROCEDURES
PROCEDURE NOTE  Date: 10/24/2024   Name: Shahrzad Gregory  YOB: 1970    Procedures    Date: 10/24/24    Referring physician: Dr. Helms    Indication  Patient aged 54 y with question of seizures. EEG done to assess for epileptiform activity.    Introduction  This 11 hour 34 min EEG was recorded using the Scopix one band system. Automated seizure detection algorithms were applied.    Description  During the maximal alert state, a well-regulated, symmetric, 6 to 8 Hz frequencies were seen bilaterally.  There was intermittent focal slowing. Stage I and stage II sleep were not clearly observed.  There were 2 generalized seizures lasting approximately 5 to 10 minutes example at 6: 02 and 6: 52 with medium amplitude 1 to 2 Hz rhythmic spike and wave discharges with evaluation of the background from theta frequencies to delta frequencies with an onset and offset.  Clinical correlation recommended.  High impedence was seen as the study progressed.    Activations  Hyperventilation was not performed. Intermittent photic stimulation was not performed     Impression  There is abnormal EEG due to 2 seizures as described above.  Clinical correlation recommended.  The faster beta frequencies are suggestive of medication use.    This was a limited 8 channel EEG, which is prone to artifact and a conventional 10-20 lead EEG may be considered if clinically indicated.     Please note this EEG was meant to screen for emergent condition and is prone to artifact and with some limitations. The interpretation of this EEG result should be taken only with clinical correlation. Ideally regular EEG with full leads should be considered when possible.     Would advise the patient to be on long-term EEG monitoring for further characterization of the epileptiform discharges and seizures.     Electronically signed by Andrae Nugent MD on 10/24/2024 at 8:41 AM

## 2024-10-24 NOTE — CARE COORDINATION
10/24/24 0831   Readmission Assessment   Number of Days since last admission? 1-7 days   Previous Disposition Home with Family   Who is being Interviewed Unable to Complete  (patient left AMA on 10/22, readmitted on 10/23. Now intubated)   What was the patient's/caregiver's perception as to why they think they needed to return back to the hospital? AMA discharge on prior admission   Did you visit your Primary Care Physician after you left the hospital, before you returned this time? No   Why weren't you able to visit your PCP? Did not have an appointment   Did you see a specialist, such as Cardiac, Pulmonary, Orthopedic Physician, etc. after you left the hospital? No   Who advised the patient to return to the hospital? Caregiver   Does the patient report anything that got in the way of taking their medications? No   In our efforts to provide the best possible care to you and others like you, can you think of anything that we could have done to help you after you left the hospital the first time, so that you might not have needed to return so soon? Other (Comment)  (left AMA)

## 2024-10-24 NOTE — PROGRESS NOTES
[facility]  PHYSICAL THERAPY MISSED TREATMENT NOTE  STRZ ICU 4D    Date: 10/24/2024  Patient Name: Shahrzad Gregory        MRN: [id number]   : 1970  (54 y.o.)  Gender: female                REASON FOR MISSED TREATMENT:  Hold treatment per nursing request. Per RN pt had a seizure at shift change and is maxed out on propofol. She is not appropriate for PT interventions at this time. Will check back next available date as able and pt medically appropriate.     Daniella Nicole PT, DPT

## 2024-10-24 NOTE — CARE COORDINATION
Case Management Assessment Initial Evaluation    Date/Time of Evaluation: 10/24/2024 8:18 AM  Assessment Completed by: Eli Roland RN    If patient is discharged prior to next notation, then this note serves as note for discharge by case management.    Patient Name: Shahrzad Gregory                   YOB: 1970  Diagnosis: Respiratory arrest (HCC) [R09.2]  ESRF (end stage renal failure) (HCC) [N18.6]                   Date / Time: 10/23/2024 12:05 PM  Location: [facility]     Patient Admission Status: Inpatient   Readmission Risk Low 0-14, Mod 15-19), High > 20: Readmission Risk Score: 34.4    Current PCP: Trang Maciel DO  Health Care Decision Makers:   Primary Decision Maker: Kulwant Badillo [facility] [phone]    Additional Case Management Notes: Presented from IP HD unit (had presented to ED for dialysis). Seizure in HD, intubated and taken to ICU. Remains on vent with ETT: AC/PC, 25%fio2, peep 3. Cardene gtt. Propofol gtt. SSI. Lantus. Intensivist and nephro following.     Procedures:   10/23 Intubated    Imaging:   10/23 CXR:   1. Left-sided pleural effusion.  2. Mild stable cardiomegaly.  10/23 CT head:   Multiple remote infarcts. No acute intracranial finding.    Patient Goals/Plan/Treatment Preferences: Signed out AMA on 10/22, presented to ED less than 24hrs later. Currently on vent. Resides at home with son who is working a short term assignment in area. Patient is originally from [place]. Her insurance is not in network with any HD center in area (for details see previous extensive CM notes). CM will need to speak with Shahrzad once she is extubated to f/u on any new needs. Assessment completed per medical records.          10/24/24 4067   Service Assessment   Patient Orientation Sedated   Cognition Other (see comment)  (sedated)   History Provided By Medical Record   Primary Caregiver Family   Support Systems Children;Family Members   Patient's Healthcare Decision Maker  is: Legal Next of Kin   PCP Verified by CM Yes  (In [place], can arrange 1 time apt at resident's clinic at discharge if she agrees.)   Last Visit to PCP Within last two years   Prior Functional Level Assistance with the following:;Bathing;Housework;Shopping;Cooking   Current Functional Level Assistance with the following:;Bathing;Dressing;Toileting;Feeding;Cooking;Housework;Shopping;Mobility   Can patient return to prior living arrangement Unknown at present   Ability to make needs known: Unable   Family able to assist with home care needs: Yes   Would you like for me to discuss the discharge plan with any other family members/significant others, and if so, who? No  (will speak with patient once she is extubated)   Financial Resources Other (Comment)  (geno desouza)   Community Resources None   Social/Functional History   Active  No   Patient's  Info family   Discharge Planning   Type of Residence House   Living Arrangements Children;Family Members   Current Services Prior To Admission None   Potential Assistance Needed Skilled Nursing Facility   DME Ordered? No   Potential Assistance Purchasing Medications No   Type of Home Care Services None   Services At/After Discharge   Transition of Care Consult (CM Consult) Discharge Planning   Services At/After Discharge None   Mode of Transport at Discharge Other (see comment)  (family)   Confirm Follow Up Transport Family   Condition of Participation: Discharge Planning   The Plan for Transition of Care is related to the following treatment goals: unable to answer

## 2024-10-25 ENCOUNTER — APPOINTMENT (OUTPATIENT)
Dept: MRI IMAGING | Age: 54
DRG: 100 | End: 2024-10-25
Payer: COMMERCIAL

## 2024-10-25 LAB
ALBUMIN SERPL BCG-MCNC: 2.9 G/DL (ref 3.5–5.1)
ALP SERPL-CCNC: 167 U/L (ref 38–126)
ALT SERPL W/O P-5'-P-CCNC: 15 U/L (ref 11–66)
ANION GAP SERPL CALC-SCNC: 15 MEQ/L (ref 8–16)
AST SERPL-CCNC: 19 U/L (ref 5–40)
BILIRUB SERPL-MCNC: 0.2 MG/DL (ref 0.3–1.2)
BUN SERPL-MCNC: 70 MG/DL (ref 7–22)
CALCIUM SERPL-MCNC: 7.5 MG/DL (ref 8.5–10.5)
CHLORIDE SERPL-SCNC: 91 MEQ/L (ref 98–111)
CO2 SERPL-SCNC: 24 MEQ/L (ref 23–33)
CORTIS SERPL-MCNC: 14.77 UG/DL
CORTISOL COLLECTION INFO: NORMAL
CREAT SERPL-MCNC: 3.8 MG/DL (ref 0.4–1.2)
DEPRECATED RDW RBC AUTO: 49.7 FL (ref 35–45)
ERYTHROCYTE [DISTWIDTH] IN BLOOD BY AUTOMATED COUNT: 15.8 % (ref 11.5–14.5)
GFR SERPL CREATININE-BSD FRML MDRD: 14 ML/MIN/1.73M2
GLUCOSE BLD STRIP.AUTO-MCNC: 105 MG/DL (ref 70–108)
GLUCOSE BLD STRIP.AUTO-MCNC: 105 MG/DL (ref 70–108)
GLUCOSE BLD STRIP.AUTO-MCNC: 109 MG/DL (ref 70–108)
GLUCOSE BLD STRIP.AUTO-MCNC: 40 MG/DL (ref 70–108)
GLUCOSE BLD STRIP.AUTO-MCNC: 53 MG/DL (ref 70–108)
GLUCOSE BLD STRIP.AUTO-MCNC: 54 MG/DL (ref 70–108)
GLUCOSE BLD STRIP.AUTO-MCNC: 56 MG/DL (ref 70–108)
GLUCOSE BLD STRIP.AUTO-MCNC: 66 MG/DL (ref 70–108)
GLUCOSE BLD STRIP.AUTO-MCNC: 76 MG/DL (ref 70–108)
GLUCOSE BLD STRIP.AUTO-MCNC: 80 MG/DL (ref 70–108)
GLUCOSE BLD STRIP.AUTO-MCNC: 80 MG/DL (ref 70–108)
GLUCOSE BLD STRIP.AUTO-MCNC: 90 MG/DL (ref 70–108)
GLUCOSE SERPL-MCNC: 69 MG/DL (ref 70–108)
HCT VFR BLD AUTO: 25.5 % (ref 37–47)
HGB BLD-MCNC: 8 GM/DL (ref 12–16)
MAGNESIUM SERPL-MCNC: 1.7 MG/DL (ref 1.6–2.4)
MCH RBC QN AUTO: 26.8 PG (ref 26–33)
MCHC RBC AUTO-ENTMCNC: 31.4 GM/DL (ref 32.2–35.5)
MCV RBC AUTO: 85.6 FL (ref 81–99)
PHOSPHATE SERPL-MCNC: 5.6 MG/DL (ref 2.4–4.7)
PLATELET # BLD AUTO: 261 THOU/MM3 (ref 130–400)
PMV BLD AUTO: 10.8 FL (ref 9.4–12.4)
POTASSIUM SERPL-SCNC: 3.5 MEQ/L (ref 3.5–5.2)
PROT SERPL-MCNC: 6.1 G/DL (ref 6.1–8)
RBC # BLD AUTO: 2.98 MILL/MM3 (ref 4.2–5.4)
SODIUM SERPL-SCNC: 130 MEQ/L (ref 135–145)
T4 FREE SERPL-MCNC: 1.59 NG/DL (ref 0.93–1.68)
TRIGL SERPL-MCNC: 27 MG/DL (ref 0–199)
TSH SERPL DL<=0.005 MIU/L-ACNC: 0.66 UIU/ML (ref 0.4–4.2)
WBC # BLD AUTO: 13.8 THOU/MM3 (ref 4.8–10.8)

## 2024-10-25 PROCEDURE — 82533 TOTAL CORTISOL: CPT

## 2024-10-25 PROCEDURE — 36415 COLL VENOUS BLD VENIPUNCTURE: CPT

## 2024-10-25 PROCEDURE — 6370000000 HC RX 637 (ALT 250 FOR IP): Performed by: STUDENT IN AN ORGANIZED HEALTH CARE EDUCATION/TRAINING PROGRAM

## 2024-10-25 PROCEDURE — 84443 ASSAY THYROID STIM HORMONE: CPT

## 2024-10-25 PROCEDURE — 85027 COMPLETE CBC AUTOMATED: CPT

## 2024-10-25 PROCEDURE — 94761 N-INVAS EAR/PLS OXIMETRY MLT: CPT

## 2024-10-25 PROCEDURE — 94003 VENT MGMT INPAT SUBQ DAY: CPT

## 2024-10-25 PROCEDURE — 84478 ASSAY OF TRIGLYCERIDES: CPT

## 2024-10-25 PROCEDURE — 6370000000 HC RX 637 (ALT 250 FOR IP): Performed by: NURSE PRACTITIONER

## 2024-10-25 PROCEDURE — 2700000000 HC OXYGEN THERAPY PER DAY

## 2024-10-25 PROCEDURE — 84439 ASSAY OF FREE THYROXINE: CPT

## 2024-10-25 PROCEDURE — 82948 REAGENT STRIP/BLOOD GLUCOSE: CPT

## 2024-10-25 PROCEDURE — 6360000002 HC RX W HCPCS

## 2024-10-25 PROCEDURE — 6360000002 HC RX W HCPCS: Performed by: STUDENT IN AN ORGANIZED HEALTH CARE EDUCATION/TRAINING PROGRAM

## 2024-10-25 PROCEDURE — 99232 SBSQ HOSP IP/OBS MODERATE 35: CPT | Performed by: INTERNAL MEDICINE

## 2024-10-25 PROCEDURE — 70551 MRI BRAIN STEM W/O DYE: CPT

## 2024-10-25 PROCEDURE — 83735 ASSAY OF MAGNESIUM: CPT

## 2024-10-25 PROCEDURE — 6370000000 HC RX 637 (ALT 250 FOR IP)

## 2024-10-25 PROCEDURE — 2580000003 HC RX 258: Performed by: STUDENT IN AN ORGANIZED HEALTH CARE EDUCATION/TRAINING PROGRAM

## 2024-10-25 PROCEDURE — 84100 ASSAY OF PHOSPHORUS: CPT

## 2024-10-25 PROCEDURE — 2000000000 HC ICU R&B

## 2024-10-25 PROCEDURE — 95720 EEG PHY/QHP EA INCR W/VEEG: CPT | Performed by: PSYCHIATRY & NEUROLOGY

## 2024-10-25 PROCEDURE — 2580000003 HC RX 258

## 2024-10-25 PROCEDURE — 80053 COMPREHEN METABOLIC PANEL: CPT

## 2024-10-25 PROCEDURE — 95718 EEG PHYS/QHP 2-12 HR W/VEEG: CPT | Performed by: PSYCHIATRY & NEUROLOGY

## 2024-10-25 PROCEDURE — 90935 HEMODIALYSIS ONE EVALUATION: CPT

## 2024-10-25 PROCEDURE — 2500000003 HC RX 250 WO HCPCS

## 2024-10-25 PROCEDURE — 99291 CRITICAL CARE FIRST HOUR: CPT | Performed by: INTERNAL MEDICINE

## 2024-10-25 RX ORDER — DEXMEDETOMIDINE HYDROCHLORIDE 4 UG/ML
.1-1.5 INJECTION, SOLUTION INTRAVENOUS CONTINUOUS
Status: DISCONTINUED | OUTPATIENT
Start: 2024-10-25 | End: 2024-10-26

## 2024-10-25 RX ORDER — DEXTROSE AND SODIUM CHLORIDE 10; .45 G/100ML; G/100ML
INJECTION, SOLUTION INTRAVENOUS CONTINUOUS
Status: DISCONTINUED | OUTPATIENT
Start: 2024-10-25 | End: 2024-10-25

## 2024-10-25 RX ORDER — NOREPINEPHRINE BITARTRATE 0.06 MG/ML
1-100 INJECTION, SOLUTION INTRAVENOUS CONTINUOUS
Status: DISCONTINUED | OUTPATIENT
Start: 2024-10-25 | End: 2024-10-26

## 2024-10-25 RX ORDER — DEXTROSE MONOHYDRATE AND SODIUM CHLORIDE 5; .45 G/100ML; G/100ML
INJECTION, SOLUTION INTRAVENOUS CONTINUOUS
Status: DISCONTINUED | OUTPATIENT
Start: 2024-10-25 | End: 2024-10-26

## 2024-10-25 RX ADMIN — DEXTROSE AND SODIUM CHLORIDE: 5; 450 INJECTION, SOLUTION INTRAVENOUS at 20:23

## 2024-10-25 RX ADMIN — PROPOFOL 30 MCG/KG/MIN: 10 INJECTION, EMULSION INTRAVENOUS at 10:56

## 2024-10-25 RX ADMIN — DEXTROSE MONOHYDRATE 125 ML: 100 INJECTION, SOLUTION INTRAVENOUS at 12:26

## 2024-10-25 RX ADMIN — CLOPIDOGREL BISULFATE 75 MG: 75 TABLET ORAL at 10:21

## 2024-10-25 RX ADMIN — ASPIRIN 81 MG: 81 TABLET, COATED ORAL at 10:21

## 2024-10-25 RX ADMIN — ATORVASTATIN CALCIUM 40 MG: 40 TABLET, FILM COATED ORAL at 21:19

## 2024-10-25 RX ADMIN — Medication 0.2 MCG/KG/HR: at 20:18

## 2024-10-25 RX ADMIN — LEVETIRACETAM 500 MG: 100 INJECTION INTRAVENOUS at 12:26

## 2024-10-25 RX ADMIN — DEXTROSE MONOHYDRATE: 50 INJECTION, SOLUTION INTRAVENOUS at 06:06

## 2024-10-25 RX ADMIN — GLUCAGON 1 MG: KIT at 00:26

## 2024-10-25 RX ADMIN — APIXABAN 5 MG: 5 TABLET, FILM COATED ORAL at 10:21

## 2024-10-25 RX ADMIN — CHLORHEXIDINE GLUCONATE, 0.12% ORAL RINSE 15 ML: 1.2 SOLUTION DENTAL at 10:22

## 2024-10-25 RX ADMIN — PROPOFOL 40 MCG/KG/MIN: 10 INJECTION, EMULSION INTRAVENOUS at 04:02

## 2024-10-25 RX ADMIN — DEXTROSE MONOHYDRATE 125 ML: 100 INJECTION, SOLUTION INTRAVENOUS at 10:27

## 2024-10-25 RX ADMIN — CHLORHEXIDINE GLUCONATE, 0.12% ORAL RINSE 15 ML: 1.2 SOLUTION DENTAL at 21:17

## 2024-10-25 RX ADMIN — DEXTROSE AND SODIUM CHLORIDE: 5; 450 INJECTION, SOLUTION INTRAVENOUS at 10:20

## 2024-10-25 RX ADMIN — FOLIC ACID 1 MG: 1 TABLET ORAL at 10:21

## 2024-10-25 RX ADMIN — DEXTROSE AND SODIUM CHLORIDE: 5; 450 INJECTION, SOLUTION INTRAVENOUS at 07:37

## 2024-10-25 RX ADMIN — LEVETIRACETAM 500 MG: 100 INJECTION INTRAVENOUS at 01:11

## 2024-10-25 RX ADMIN — PROPOFOL 40 MCG/KG/MIN: 10 INJECTION, EMULSION INTRAVENOUS at 14:37

## 2024-10-25 RX ADMIN — APIXABAN 5 MG: 5 TABLET, FILM COATED ORAL at 21:17

## 2024-10-25 RX ADMIN — LEVETIRACETAM 500 MG: 100 INJECTION INTRAVENOUS at 23:37

## 2024-10-25 RX ADMIN — DEXTROSE MONOHYDRATE: 100 INJECTION, SOLUTION INTRAVENOUS at 00:24

## 2024-10-25 RX ADMIN — SODIUM CHLORIDE, PRESERVATIVE FREE 5 ML: 5 INJECTION INTRAVENOUS at 21:18

## 2024-10-25 ASSESSMENT — PAIN SCALES - GENERAL
PAINLEVEL_OUTOF10: 0

## 2024-10-25 ASSESSMENT — PULMONARY FUNCTION TESTS
PIF_VALUE: 15
PIF_VALUE: 19
PIF_VALUE: 16

## 2024-10-25 NOTE — PLAN OF CARE
Problem: Respiratory - Adult  Goal: Achieves optimal ventilation and oxygenation  10/25/2024 1720 by Alexa Hensley RCP  Outcome: Progressing                                                Patient Weaning Progress    The patient's vent settings was able to be weaned this shift.      Ventilator settings that were weaned              [x] Mode   [x] Pressure support weaned   [x] Fio2 weaned   [] Peep weaned      Spontaneous weaning trial  was attempted.     due to defined parameters for SBT (spontaneous breathing trial) not being met.     *Results of SBT documented under SBTOUTCOME note.    Reason that defined ventilator parameters for SBT was not met              [] Patient condition requires increased ventilator settings  [] Requires increased sedation   [] Settings not within weaning range   [] SAT not completed   [] Physician orders    The patient was able to tolerate SBT.  SpO2 of 100 on 30% FiO2.  Spontanteous VT was 561 and RR 16 breaths/min.  The patient was on SBT for 45 minutes     Evac tube was  hooked up with continuous low suction(20-30mmHg)      Cuff was  deflated to determine cuff leak. A leak  was detected.   Patient mutually agreed on goals.    Ventilator spontaneous breathing trial outcome:                [x] Tolerated SBT and vitals are stable   [x] Order to extubate entered by provider    [] SBT not tolerated    [] Respiratory distress    [] Decreased LOC    [] Vitals unstable    [] Agitation  [] Provider request

## 2024-10-25 NOTE — PROGRESS NOTES
[facility]  PHYSICAL THERAPY MISSED TREATMENT NOTE  STRZ ICU 4D    Date: 10/25/2024  Patient Name: Shahrzad Gregory        MRN: [id number]   : 1970  (54 y.o.)  Gender: female                REASON FOR MISSED TREATMENT:  Hold treatment per nursing request.      Patient is not alert nor following commands.  Will check back when appropriate.

## 2024-10-25 NOTE — CARE COORDINATION
10/25/24, 3:01 PM EDT    DISCHARGE ON GOING EVALUATION    Shahrzad Gregory       [facility] day: 2  Location: -03/003-A Reason for admit: Respiratory arrest (HCC) [R09.2]  ESRF (end stage renal failure) (HCC) [N18.6]     Procedures:   10/23 Intubated  10/24 11 hr 34 min EEG: There is abnormal EEG due to 2 seizures as described above   10/25 Continuous EEG: no seizures noted; suggestive of severe encephalopathy    Imaging since last note:   10/25 MRI Brain: 1. Areas of encephalomalacia in the left and to a lesser extent right occipital  lobes and in the left posterior temporal lobe. There is associated  susceptibility artifact.  2. Increased signal intensity in the white matter and maryan most likely  representing ischemic changes. No evidence of an acute infarct.  3. 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  4. Mild atrophy.    Barriers to Discharge: Continuous EEG completed today; no seizures noted. HD today with 2.6L removed. Diprivan drip stopped this afternoon.     Remains on vent w/ETT on SBT, FIO2 25%, sats 100%. Afebrile. NSR. Unable to follow commands; GORDON x4 to painful stim. Dietitian consulted. PT/OT. Intensivist and Nephrology following. Telemetry, tunneled HD cath, OG w/TF, external urinary catheter, SCDs. D5/45, eliquis, asa, lipitor, plavix, peridex, folic acid, IV keppra. Na+ 130, BUN 70, creat 3.8, phos 5.6, alb 2.9, alk phos 167, wbc 13.8, hgb 8.     PCP: Trang Maciel DO  Readmission Risk Score: 33.3    Patient Goals/Plan/Treatment Preferences: From home with son and family. Son is working a short term assignment in area. Patient is originally from [place]. She is not a [place] citizen, so does not have a SS#. Her insurance is not in network with any HD center in [facility] (for details see previous extensive CM notes). Monitor for needs as course progresses.

## 2024-10-25 NOTE — PROGRESS NOTES
Patient Weaning Progress    The patient's vent settings was able to be weaned this shift.      Ventilator settings that were weaned              [] Mode   [x] Pressure support weaned   [] Fio2 weaned   [] Peep weaned      Spontaneous weaning trial  was not attempted.     due to defined parameters for SBT (spontaneous breathing trial) not being met.     Family mutually agreed on goals.    Unable to get agreement for goals because no family is present and patient cannot respond.

## 2024-10-25 NOTE — FLOWSHEET NOTE
10/25/24 1215   Vital Signs   BP (!) 140/52   Temp 98.5 °F (36.9 °C)   Pulse 67   Respirations 11   SpO2 100 %   Weight - Scale 91.3 kg (201 lb 4.5 oz)   Weight Method Bed scale   Percent Weight Change -3.18   Pain Assessment   Pain Assessment Critical Care Pain Observation Tool (CPOT)   Pain Level 0   Patient's Stated Pain Goal Unable to verbalize/indicate pain goal   Post-Hemodialysis Assessment   Post-Treatment Procedures Catheter Capped, clamped with Saline x2 ports   Machine Disinfection Process Acid/Vinegar Clean   Rinseback Volume (ml) 400 ml   Blood Volume Processed (Liters) 70.2 L   Dialyzer Clearance Lightly streaked   Duration of Treatment (minutes) 210 minutes   Heparin Amount Administered During Treatment (mL) 0 mL   Hemodialysis Intake (ml) 400 ml   Hemodialysis Output (ml) 3000 ml   NET Removed (ml) 2600   Tolerated Treatment Good   Observations & Evaluations   Level of Consciousness 2   O2 Device Ventilator     Stable 3.5hour treatment completed. 3000ml removed and tolerated well. CVC dressing clean, dry and intact/ last changed 10-24. CVC flushed with 0.9NS and secured with tegos. Report given to primary nurse. Charting scanned and placed in the Bin.

## 2024-10-25 NOTE — PROGRESS NOTES
[facility]  PHYSICAL THERAPY MISSED TREATMENT NOTE  STRZ ICU 4D    Date: 10/25/2024  Patient Name: Shahrzad Gregory        MRN: [id number]   : 1970  (54 y.o.)  Gender: female                REASON FOR MISSED TREATMENT:  Hold treatment per nursing request.      Patient currently on EEG and hemodialysis.  Will check back next available date/time.

## 2024-10-25 NOTE — PROGRESS NOTES
provider] bumetanide  1 mg IntraVENous Daily    epoetin george-epbx  6,000 Units SubCUTAneous Once per day on Tuesday Thursday Saturday    levETIRAcetam  500 mg IntraVENous Q12H    cosyntropin  250 mcg IntraVENous Once    chlorhexidine  15 mL Mouth/Throat BID    sodium chloride flush  5-40 mL IntraVENous 2 times per day    apixaban  5 mg Oral BID    aspirin  81 mg Oral Daily    atorvastatin  40 mg Oral QPM    [Held by provider] carvedilol  25 mg Oral BID     clopidogrel  75 mg Oral Daily    folic acid  1 mg Oral Daily    [Held by provider] hydrALAZINE  25 mg Oral TID    [Held by provider] losartan  100 mg Oral Daily    [Held by provider] metOLazone  2.5 mg Oral Daily    [Held by provider] sertraline  50 mg Oral QPM    [Held by provider] sevelamer  800 mg Oral TID      Meds prn: glucose, dextrose bolus **OR** dextrose bolus, dextrose, sodium chloride flush, sodium chloride, polyethylene glycol, acetaminophen **OR** acetaminophen, promethazine **OR** ondansetron, glucagon (rDNA), hydrALAZINE     Lab Data :  CBC:   Recent Labs     10/23/24  1254 10/24/24  0354 10/25/24  0825   WBC 7.7 10.4 13.8*   HGB 9.0* 8.6* 8.0*   HCT 29.2* 28.2* 25.5*    264 261     CMP:  Recent Labs     10/23/24  1254 10/23/24  2011 10/24/24  0354 10/25/24  0825     --  137 130*   K 4.5  --  3.5 3.5     --  100 91*   CO2 22*  --  23 24   BUN 78*  --  50* 70*   CREATININE 4.2*  --  3.4* 3.8*   GLUCOSE 215*  --  34* 69*   CALCIUM 8.1*  --  8.2* 7.5*   MG  --  1.9 1.7 1.7   PHOS  --   --  4.5 5.6*     Hepatic:   Recent Labs     10/23/24  2011 10/25/24  0825   AST 34 19   ALT 20 15   BILITOT 0.3 0.2*   ALKPHOS 168* 167*         Assessment and Plan:  ESRD on HD  Continue dialysis Mondays, Wednesdays and Fridays.  Plan dialysis today  Mild metabolic acidosis.  Improved  Anemia in ESRD.  Continue Retacrit  Seizure  Hyponatremia due to hypotonic fluids.  I have adjusted dialysate sodium bath  Hypertension  Hypoglycemia.  On hypotonic

## 2024-10-25 NOTE — PROCEDURES
PROCEDURE NOTE  Date: 10/25/2024   Name: Shahrzad Gregory  YOB: 1970    Procedures      LONG-TERM EEG-VIDEO MONITORING   CLINICAL NEUROPHYSIOLOGY LABORATORY  DEPARTMENT OF NEUROLOGY  [facility]     Patient: Shahrzad Gregory  Age: 54 y.o.  MRN: [id number]    Referring Physician: Trang Maciel DO  History: The patient is a 54 y.o. female who presented breakthrough seizure/encephalopathy. This long-term video-EEG monitoring study was performed to determine the nature of the patient's clinical events. The patient is on neuroactive medications.   Shahrzad Gregory   Current Facility-Administered Medications   Medication Dose Route Frequency Provider Last Rate Last Admin    norepinephrine (LEVOPHED) 16 mg in sodium chloride 0.9 % 250 mL infusion  1-100 mcg/min IntraVENous Continuous Armani Zazueta MD        dextrose 5 % and 0.45 % sodium chloride infusion   IntraVENous Continuous [place]  mL/hr at 10/25/24 1102 Rate Verify at 10/25/24 1102    glucose chewable tablet 16 g  4 tablet Oral PRN Rishabh Pratt DO        dextrose bolus 10% 125 mL  125 mL IntraVENous PRN Rishabh Pratt .5 mL/hr at 10/25/24 1226 125 mL at 10/25/24 1226    Or    dextrose bolus 10% 250 mL  250 mL IntraVENous PRN Rishabh Pratt DO   Stopped at 10/24/24 0419    dextrose 10 % infusion   IntraVENous Continuous PRN Rishabh Pratt DO   Stopped at 10/25/24 1019    [Held by provider] insulin glargine (LANTUS) injection vial 8 Units  8 Units SubCUTAneous Daily Rishabh Pratt DO        [Held by provider] insulin lispro (HUMALOG,ADMELOG) injection vial 0-8 Units  0-8 Units SubCUTAneous 4x Daily AC & HS Rishabh Pratt DO        [Held by provider] bumetanide (BUMEX) injection 1 mg  1 mg IntraVENous Daily Masoud Osborn MD   1 mg at 10/24/24 0917    epoetin george-epbx (RETACRIT) 6,000 Units combo injection  6,000 Units SubCUTAneous Once per day  MD Rio   75 mg at 10/25/24 1021    folic acid (FOLVITE) tablet 1 mg  1 mg Oral Daily [place] MD   1 mg at 10/25/24 1021    [Held by provider] hydrALAZINE (APRESOLINE) tablet 25 mg  25 mg Oral TID Darion Orantes DO        [Held by provider] losartan (COZAAR) tablet 100 mg  100 mg Oral Daily Darion Orantes DO        [Held by provider] metOLazone (ZAROXOLYN) tablet 2.5 mg  2.5 mg Oral Daily Darion Orantes DO        [Held by provider] sertraline (ZOLOFT) tablet 50 mg  50 mg Oral QPM Darion Orantes DO        [Held by provider] sevelamer (RENVELA) tablet 800 mg  800 mg Oral TID WC Darion Orantes DO        glucagon injection 1 mg  1 mg SubCUTAneous PRN Darion Orantes DO   1 mg at 10/25/24 0026    hydrALAZINE (APRESOLINE) injection 5 mg  5 mg IntraVENous Q6H PRN Darion Orantes DO         Technical Description: This is a 21-channel digital EEG recording with time-locked video. Electrodes were placed in accordance with the 10-20 International System of Electrode Placement. Single lead EKG monitoring was included.    Baseline EEG Recording:  A formal baseline EEG recording was not obtained.     Day - 10/24/24, starting at 9:53 and reviewed upto 7:30 on 10/25/24    Interictal EEG Samples:  In the intubated state, the background rhythm was a symmetric, poorly-modulated, 6-8 hz, 20-40 uV rhythm. There was poor anterior posterior amplitude gradient.  Sleep structures were not seen.  The EKG channel revealed no abnormalities.    Ictal EEG Recording / Patient Events: During this period the patient had no events or seizures.    Summary: During this day of recording no events were recorded. The interictal EEG was abnormal due to diffuse delta slowing admixed with rare theta frequencies suggestive of severe encephalopathy. Monitoring was continued in order to record the patient's typical events. The EKG channel revealed no abnormalities.    Day - 10/25/24, starting at 7:30 and reviewed upto 10:52 on

## 2024-10-25 NOTE — PROGRESS NOTES
[facility]  OCCUPATIONAL THERAPY MISSED TREATMENT NOTE  STRZ ICU 4D  4D-03/003-A      Date: 10/25/2024  Patient Name: Shahrzad Gregory        CSN: [id number]   : 1970  (54 y.o.)  Gender: female                REASON FOR MISSED TREATMENT: Attempted to see pt x2 this date. Per RN, pt on CEEG until 11am, pending MRI brain, and has HD running. Will follow up as appropriate.

## 2024-10-25 NOTE — PROCEDURES
PROCEDURE NOTE  Date: 10/25/2024   Name: Shahrzad Gregory  YOB: 1970    Procedures      LONG-TERM EEG-VIDEO MONITORING   CLINICAL NEUROPHYSIOLOGY LABORATORY  DEPARTMENT OF NEUROLOGY  [facility]     Patient: Shahrzad Gregory  Age: 54 y.o.  MRN: [id number]    Referring Physician: Trang Maciel DO  History: The patient is a 54 y.o. female who presented breakthrough seizure/encephalopathy. This long-term video-EEG monitoring study was performed to determine the nature of the patient's clinical events. The patient is on neuroactive medications.   Shahrzad Gregory   Current Facility-Administered Medications   Medication Dose Route Frequency Provider Last Rate Last Admin    norepinephrine (LEVOPHED) 16 mg in sodium chloride 0.9 % 250 mL infusion  1-100 mcg/min IntraVENous Continuous Armani Zazueta MD        glucose chewable tablet 16 g  4 tablet Oral PRN Rishabh Pratt DO        dextrose bolus 10% 125 mL  125 mL IntraVENous PRN Rishabh Pratt DO   Stopped at 10/24/24 2049    Or    dextrose bolus 10% 250 mL  250 mL IntraVENous PRN Rishabh Pratt DO   Stopped at 10/24/24 0419    dextrose 10 % infusion   IntraVENous Continuous PRN Rishabh Pratt  mL/hr at 10/25/24 0840 Rate Verify at 10/25/24 0840    [Held by provider] insulin glargine (LANTUS) injection vial 8 Units  8 Units SubCUTAneous Daily Rishabh Pratt DO        [Held by provider] insulin lispro (HUMALOG,ADMELOG) injection vial 0-8 Units  0-8 Units SubCUTAneous 4x Daily AC & HS Rishabh Pratt DO        [Held by provider] bumetanide (BUMEX) injection 1 mg  1 mg IntraVENous Daily Masoud Osborn MD   1 mg at 10/24/24 0917    epoetin george-epbx (RETACRIT) 6,000 Units combo injection  6,000 Units SubCUTAneous Once per day on Tuesday Thursday Saturday Masoud Osborn MD   6,000 Units at 10/24/24 1657    levETIRAcetam (KEPPRA) injection 500 mg  500 mg IntraVENous Q12H  Rio Rivers MD   500 mg at 10/25/24 0111    dextrose 5 % and 0.45 % sodium chloride infusion   IntraVENous Continuous Rio Rivers MD 50 mL/hr at 10/25/24 0840 Rate Verify at 10/25/24 0840    cosyntropin (CORTROSYN) injection 250 mcg  250 mcg IntraVENous Once Rio Rivers MD        chlorhexidine (PERIDEX) 0.12 % solution 15 mL  15 mL Mouth/Throat BID More Kent APRN - CNP   15 mL at 10/24/24 2039    propofol infusion  5-50 mcg/kg/min IntraVENous Continuous Darion Orantes DO 10.4 mL/hr at 10/25/24 0840 20 mcg/kg/min at 10/25/24 0840    niCARdipine (CARDENE) 25 mg in sodium chloride 0.9 % 250 mL infusion ([id number])  2.5-15 mg/hr IntraVENous Continuous More Knet APRN - CNP   Stopped at 10/23/24 2101    sodium chloride flush 0.9 % injection 5-40 mL  5-40 mL IntraVENous 2 times per day Darion Orantes, DO   10 mL at 10/24/24 2039    sodium chloride flush 0.9 % injection 5-40 mL  5-40 mL IntraVENous PRN Darion Orantes DO   10 mL at 10/24/24 1331    0.9 % sodium chloride infusion   IntraVENous PRN Darion Orantes, DO        polyethylene glycol (GLYCOLAX) packet 17 g  17 g Oral Daily PRN Darion Orantes, DO        acetaminophen (TYLENOL) tablet 650 mg  650 mg Oral Q6H PRN Darion Orantes,         Or    acetaminophen (TYLENOL) suppository 650 mg  650 mg Rectal Q6H PRN Darion Orantes, DO        promethazine (PHENERGAN) tablet 12.5 mg  12.5 mg Oral Q6H PRN Darion Orantes DO        Or    ondansetron (ZOFRAN) injection 4 mg  4 mg IntraVENous Q6H PRN Darion Orantes, DO        apixaban (ELIQUIS) tablet 5 mg  5 mg Oral BID Rio Rivers MD   5 mg at 10/24/24 2039    aspirin EC tablet 81 mg  81 mg Oral Daily Rio Rivers MD        atorvastatin (LIPITOR) tablet 40 mg  40 mg Oral QPM Darion Orantes, DO   40 mg at 10/24/24 2039    [Held by provider] carvedilol (COREG) tablet 25 mg  25 mg Oral BID  Rio Rivers MD        clopidogrel (PLAVIX) tablet 75 mg  75 mg Oral Daily Rio Rivers MD

## 2024-10-25 NOTE — PROGRESS NOTES
Order obtained for extubation.  SpO2 of 100 on 30% FiO2.   Patient extubated and placed on 2 liters/min via nasal cannula.   Post extubation SpO2 is 100% with HR  89 bpm and RR 18 breaths/min.    Patient had moderate cough that was non-productive.  Extubation Well tolerated by patient..   Breath Sounds: diminshed    Alexa Hensley RCP   3:58 PM

## 2024-10-25 NOTE — PLAN OF CARE
Problem: Chronic Conditions and Co-morbidities  Goal: Patient's chronic conditions and co-morbidity symptoms are monitored and maintained or improved  Outcome: Progressing  Flowsheets (Taken 10/23/2024 2100 by Shauna Morris, RN)  Care Plan - Patient's Chronic Conditions and Co-Morbidity Symptoms are Monitored and Maintained or Improved:   Monitor and assess patient's chronic conditions and comorbid symptoms for stability, deterioration, or improvement   Collaborate with multidisciplinary team to address chronic and comorbid conditions and prevent exacerbation or deterioration   Update acute care plan with appropriate goals if chronic or comorbid symptoms are exacerbated and prevent overall improvement and discharge     Problem: Discharge Planning  Goal: Discharge to home or other facility with appropriate resources  Outcome: Progressing  Flowsheets (Taken 10/23/2024 2100 by Shauna Morris, RN)  Discharge to home or other facility with appropriate resources:   Identify barriers to discharge with patient and caregiver   Arrange for needed discharge resources and transportation as appropriate   Identify discharge learning needs (meds, wound care, etc)   Arrange for interpreters to assist at discharge as needed   Refer to discharge planning if patient needs post-hospital services based on physician order or complex needs related to functional status, cognitive ability or social support system     Problem: Pain  Goal: Verbalizes/displays adequate comfort level or baseline comfort level  Outcome: Progressing  Flowsheets (Taken 10/23/2024 2100 by Shauna Morris, RN)  Verbalizes/displays adequate comfort level or baseline comfort level:   Consider cultural and social influences on pain and pain management   Assess pain using appropriate pain scale     Problem: Safety - Medical Restraint  Goal: Remains free of injury from restraints (Restraint for Interference with Medical Device)  Description: INTERVENTIONS:  1. Determine that  other, less restrictive measures have been tried or would not be effective before applying the restraint  2. Evaluate the patient's condition at the time of restraint application  3. Inform patient/family regarding the reason for restraint  4. Q2H: Monitor safety, psychosocial status, comfort, nutrition and hydration  Outcome: Progressing  Flowsheets (Taken 10/24/2024 0700 by Jocelyn Rasmussen RN)  Remains free of injury from restraints (restraint for interference with medical device): Every 2 hours: Monitor safety, psychosocial status, comfort, nutrition and hydration     Problem: Safety - Adult  Goal: Free from fall injury  Outcome: Progressing  Flowsheets (Taken 10/24/2024 1013 by Jocelyn Rasmussen RN)  Free From Fall Injury: Instruct family/caregiver on patient safety     Problem: Skin/Tissue Integrity  Goal: Absence of new skin breakdown  Description: 1.  Monitor for areas of redness and/or skin breakdown  2.  Assess vascular access sites hourly  3.  Every 4-6 hours minimum:  Change oxygen saturation probe site  4.  Every 4-6 hours:  If on nasal continuous positive airway pressure, respiratory therapy assess nares and determine need for appliance change or resting period.  Outcome: Progressing     Problem: ABCDS Injury Assessment  Goal: Absence of physical injury  Outcome: Progressing  Flowsheets (Taken 10/25/2024 0656)  Absence of Physical Injury: Implement safety measures based on patient assessment     Problem: Nutrition Deficit:  Goal: Optimize nutritional status  Outcome: Progressing  Flowsheets (Taken 10/24/2024 1035 by Zara Esteban, MS, RD, LD)  Nutrient intake appropriate for improving, restoring, or maintaining nutritional needs:   Assess nutritional status and recommend course of action   Monitor oral intake, labs, and treatment plans   Recommend, monitor, and adjust tube feedings and TPN/PPN based on assessed needs

## 2024-10-25 NOTE — PROCEDURES
The interictal EEG was reviewed upto 9:30 pm and was abnormal due to diffuse delta slowing admixed with rare theta frequencies suggestive of severe encephalopathy.    Electronically signed by Andrae Nugent MD on 10/24/2024 at 9:44 PM

## 2024-10-25 NOTE — PROGRESS NOTES
Comprehensive Nutrition Assessment    Type and Reason for Visit:  Reassess, Consult (TF adjustment)    Nutrition Recommendations/Plan:   Increase Nepro to goal of 20ml/hour with current diprivan - cho load will increase to 77gms/24h from 38gms  Bolus 1 2.5ounce liquid protein bottle daily  Additional free H20 per Physician - 200ml every 6h per Dr. Helms 10/24  Monitoring labs, diprivan for TF adjustments as appropriate     Malnutrition Assessment:  Malnutrition Status:  Insufficient data (10/24/24 1032)    Context:  Acute Illness     Findings of the 6 clinical characteristics of malnutrition:  Energy Intake:  Unable to assess (pt intubated- no family present. NPO x1 day)  Weight Loss:  Unable to assess (pt with ESRD on hemodialyisis so difficult to asses dry weight)     Body Fat Loss:  No significant body fat loss     Muscle Mass Loss:  No significant muscle mass loss    Fluid Accumulation:  Unable to assess (pt with ESRD on hemodialyisis so difficult to asses dry weight)     Strength:   not performed    Nutrition Assessment:      Pt. nutritionally compromised AEB pt intubated and sedated with need for nutrition support to meet estimated nutritional needs. At risk for further nutrition compromise r/t admit d/t altered mental status s/p emergent intubation on 10/23 for seizure during dialysis, ESRD on Hemodialysis (Nephrology following), metabolic acidosis, hypoglycemia 10/25, LOD day 2 and underlying medical condition (Hx: CAD s/p CABG, Atrial Fibrillation, DM- A1c 6.5% 9/2024, Multiple CVA's, Anemia, HTN, CHF)     Nutrition Related Findings:    Pt. Report/Treatments/Miscellaneous: pt. Seen; spoke with RN; hypoglycemic this am - glucagon and D10 IVFs; TF rate is low d/t high lipid kcal content of the diprivan; TF increased today; UO 400ml; HD; was on lantus and humalog - both held; tolerating TF at 10ml/hourand protein modular given per RN; intubated; Amharic speaking pt.  GI Status: BM x1 10/25  Pertinent Labs:  status due to medical condition, intubation, nutrition support - enteral nutrition    Nutrition Interventions:   Food and/or Nutrient Delivery: Continue NPO, Modify Tube Feeding  Nutrition Education/Counseling: No recommendation at this time  Coordination of Nutrition Care: Continue to monitor while inpatient, Interdisciplinary Rounds       Goals:     Goals: Tolerate nutrition support at goal rate, by next RD assessment       Nutrition Monitoring and Evaluation:      Food/Nutrient Intake Outcomes: Enteral Nutrition Intake/Tolerance, IVF Intake  Physical Signs/Symptoms Outcomes: Biochemical Data, GI Status, Fluid Status or Edema, Hemodynamic Status, Nutrition Focused Physical Findings, Skin, Weight    Discharge Planning:    Too soon to determine     Samantha Dhaliwal RD, LD  Contact: [phone]

## 2024-10-25 NOTE — PROGRESS NOTES
CRITICAL CARE PROGRESS NOTE      Patient:  Shahrzad Gregory    Unit/Bed:4D-03/003-A  YOB: 1970  MRN: [id number]   PCP: Trang Maciel DO  Date of Admission: 10/23/2024  Chief Complaint:- Altered mental status    Assessment and Plan:    New Onset generalized Seizure:    Patient presented to  [facility] ED for hemodialysis, while 2 hours into dialysis, she had witness seizure activity with altered mental status, Rapid response was called which was later converted to Code blue (though patient did not loose pulse or did not have arrhthymias but needed higher medical attention). During Seizure activity, Patient bit her tongue and was bleeding, Patient was intubated for respiratory protection. Patient was transferred to ICU for further management.    Cerebell this morning (10/24/24) showed 2 generalized seizures lasting approximately 5 to 10 minutes example at 6: 02 and 6:52 am with medium amplitude 1 to 2 Hz rhythmic spike and wave discharges with evaluation of the background from theta frequencies to delta frequencies with an onset and offset.  Patient was started on EEG 24 hours, Patient is already on Propofol, Will start patient on Seizure treatment considering 3 seizures in last 24 hours.  Patient was started on Keppra 500 IV BID  CTH with out acute intracranial finding   MRI without contrast considering patient is ESRD, Pending MRI    ESRD:    Patient is ESRD, from [place], Moved here and has insurance issues, Has been coming to [facility] ED for as needed dialysis.  Nephrology consulted, patient received HD 10/23 (2 hours)  HD per Nephrology  Hypoglycemia   History of DM   Patient has been having episodes of hypoglycemia  Patient was started on D5W, switched to D5w 1/2 NS, was still having episodes of hypoglycemia and is getting D10 prn as a protocol, will switch from D5W1/2 NS to D10W1/2 NS  Nephrology doing HD with Na bath of 140.  TSH/T4 pending, Cosyntropin test pending  If patient does not  EXAMINATION:  T:  99.4.  P:  59. RR:  16. B/P:  132/49.  FiO2:  25. O2 Sat:  98.  I/O:  1062/2050 (, HD 1850)  Weight: 189 > 189 > 191  Body mass index is 35 kg/m².   GCS:   5 (Intubated)  PC: 18/3: TV: 350: RRTotal: 22: Ti:1 sec:   General:   Intubated and Sedated  HEENT:  normocephalic and atraumatic.   Neck: supple.  No Thyromegaly.  Lungs: Fine crackles B/L lower lobes,   No retractions  Cardiac: RRR.  No JVD.  Abdomen: soft.  Nontender.  Extremities:  No clubbing, cyanosis, or edema x 4.    Vasculature: capillary refill < 3 seconds. Palpable dorsalis pedis pulses.  Skin:  warm and dry.  Psych:  Intubated and sedated  Lymph:  No supraclavicular adenopathy.  Neurologic:  Patient is intubated and sedated, unable to perform    Data: (All radiographs, tracings, PFTs, and imaging are personally viewed and interpreted unless otherwise noted).   CMP-Pending  CBC-WBC 13.8, hemoglobin 8.0, medic at 25.5, platelets 267    Cerebell (10/24/2024)  showed  2 generalized seizures lasting approximately 5 to 10 minutes example at 6: 02 and 6:52 am with medium amplitude 1 to 2 Hz rhythmic spike and wave discharges with evaluation of the background from theta frequencies to delta frequencies with an onset and offset   CTH (10/23/24): Multiple remote infarcts. No acute intracranial finding   EEG:  abnormal due to diffuse delta slowing admixed with rare theta frequencies suggestive of severe encephalopathy.       Case and plan discussed with Dr. Helms.    Electronically signed by Rio Rivers MD  CRITICAL CARE SPECIALIST   Patient seen by me including key components of medical care.  Case discussed with nurses.  Discontinue sedation.  MRI shows evidence of old infarct.  No acute new infarct.  Chronic ischemic changes noted.  Encephalomalacia noted.  Patient doing well on pressure support 12.  Discontinue sedation.  If she awakens, she can be extubated.  Discontinue sedation.  If she has seizure activity, resume

## 2024-10-25 NOTE — PLAN OF CARE
and hygiene practices   Implement preventative oral hygiene regimen   Implement oral medicated treatments as ordered     Problem: Musculoskeletal - Adult  Goal: Return mobility to safest level of function  Outcome: Progressing  Flowsheets (Taken 10/25/2024 0938)  Return Mobility to Safest Level of Function:   Assess patient stability and activity tolerance for standing, transferring and ambulating with or without assistive devices   Assist with transfers and ambulation using safe patient handling equipment as needed   Obtain physical therapy/occupational therapy consults as needed   Instruct patient/family in ordered activity level     Problem: Gastrointestinal - Adult  Goal: Maintains or returns to baseline bowel function  Outcome: Progressing  Flowsheets (Taken 10/25/2024 0938)  Maintains or returns to baseline bowel function:   Assess bowel function   Encourage oral fluids to ensure adequate hydration   Administer IV fluids as ordered to ensure adequate hydration   Administer ordered medications as needed   Encourage mobilization and activity   Nutrition consult to assist patient with appropriate food choices     Problem: Gastrointestinal - Adult  Goal: Maintains adequate nutritional intake  Outcome: Progressing  Flowsheets (Taken 10/25/2024 0938)  Maintains adequate nutritional intake:   Monitor percentage of each meal consumed   Identify factors contributing to decreased intake, treat as appropriate   Assist with meals as needed   Monitor intake and output, weight and lab values   Obtain nutritional consult as needed     Problem: Metabolic/Fluid and Electrolytes - Adult  Goal: Electrolytes maintained within normal limits  Outcome: Progressing  Flowsheets (Taken 10/25/2024 0938)  Electrolytes maintained within normal limits:   Monitor labs and assess patient for signs and symptoms of electrolyte imbalances   Monitor response to electrolyte replacements, including repeat lab results as appropriate   Administer  electrolyte replacement as ordered     Problem: Metabolic/Fluid and Electrolytes - Adult  Goal: Hemodynamic stability and optimal renal function maintained  Outcome: Progressing  Flowsheets (Taken 10/25/2024 0938)  Hemodynamic stability and optimal renal function maintained:   Monitor labs and assess for signs and symptoms of volume excess or deficit   Monitor intake, output and patient weight   Monitor urine specific gravity, serum osmolarity and serum sodium as indicated or ordered   Monitor response to interventions for patient's volume status, including labs, urine output, blood pressure (other measures as available)     Problem: Metabolic/Fluid and Electrolytes - Adult  Goal: Glucose maintained within prescribed range  Outcome: Progressing  Flowsheets (Taken 10/25/2024 0938)  Glucose maintained within prescribed range:   Administer ordered medications to maintain glucose within target range   Monitor blood glucose as ordered   Assess for signs and symptoms of hyperglycemia and hypoglycemia   Assess barriers to adequate nutritional intake and initiate nutrition consult as needed   Instruct patient on self management of diabetes and initiate consult as needed     Care plan reviewed with patient and family.  Patient and family verbalize understanding of the plan of care and contribute to goal setting.

## 2024-10-26 LAB
ALBUMIN SERPL BCG-MCNC: 2.7 G/DL (ref 3.5–5.1)
ALP SERPL-CCNC: 135 U/L (ref 38–126)
ALT SERPL W/O P-5'-P-CCNC: 13 U/L (ref 11–66)
ANION GAP SERPL CALC-SCNC: 13 MEQ/L (ref 8–16)
AST SERPL-CCNC: 18 U/L (ref 5–40)
BASOPHILS ABSOLUTE: 0 THOU/MM3 (ref 0–0.1)
BASOPHILS NFR BLD AUTO: 0.4 %
BILIRUB SERPL-MCNC: 0.2 MG/DL (ref 0.3–1.2)
BUN SERPL-MCNC: 31 MG/DL (ref 7–22)
CALCIUM SERPL-MCNC: 7.4 MG/DL (ref 8.5–10.5)
CHLORIDE SERPL-SCNC: 94 MEQ/L (ref 98–111)
CO2 SERPL-SCNC: 25 MEQ/L (ref 23–33)
CORTIS SERPL-MCNC: 21.12 UG/DL
CORTIS SERPL-MCNC: 23.46 UG/DL
CORTISOL COLLECTION INFO: NORMAL
CORTISOL COLLECTION INFO: NORMAL
CREAT SERPL-MCNC: 2.4 MG/DL (ref 0.4–1.2)
DEPRECATED RDW RBC AUTO: 51.3 FL (ref 35–45)
EOSINOPHIL NFR BLD AUTO: 4 %
EOSINOPHILS ABSOLUTE: 0.5 THOU/MM3 (ref 0–0.4)
ERYTHROCYTE [DISTWIDTH] IN BLOOD BY AUTOMATED COUNT: 15.7 % (ref 11.5–14.5)
GFR SERPL CREATININE-BSD FRML MDRD: 24 ML/MIN/1.73M2
GLUCOSE BLD STRIP.AUTO-MCNC: 126 MG/DL (ref 70–108)
GLUCOSE BLD STRIP.AUTO-MCNC: 141 MG/DL (ref 70–108)
GLUCOSE BLD STRIP.AUTO-MCNC: 145 MG/DL (ref 70–108)
GLUCOSE BLD STRIP.AUTO-MCNC: 154 MG/DL (ref 70–108)
GLUCOSE BLD STRIP.AUTO-MCNC: 207 MG/DL (ref 70–108)
GLUCOSE BLD STRIP.AUTO-MCNC: 208 MG/DL (ref 70–108)
GLUCOSE SERPL-MCNC: 124 MG/DL (ref 70–108)
HCT VFR BLD AUTO: 27.1 % (ref 37–47)
HGB BLD-MCNC: 8.2 GM/DL (ref 12–16)
IMM GRANULOCYTES # BLD AUTO: 0.05 THOU/MM3 (ref 0–0.07)
IMM GRANULOCYTES NFR BLD AUTO: 0.4 %
LYMPHOCYTES ABSOLUTE: 1.8 THOU/MM3 (ref 1–4.8)
LYMPHOCYTES NFR BLD AUTO: 14.4 %
MAGNESIUM SERPL-MCNC: 1.7 MG/DL (ref 1.6–2.4)
MCH RBC QN AUTO: 27.2 PG (ref 26–33)
MCHC RBC AUTO-ENTMCNC: 30.3 GM/DL (ref 32.2–35.5)
MCV RBC AUTO: 90 FL (ref 81–99)
MONOCYTES ABSOLUTE: 1.3 THOU/MM3 (ref 0.4–1.3)
MONOCYTES NFR BLD AUTO: 10.5 %
NEUTROPHILS ABSOLUTE: 8.6 THOU/MM3 (ref 1.8–7.7)
NEUTROPHILS NFR BLD AUTO: 70.3 %
NRBC BLD AUTO-RTO: 0 /100 WBC
PHOSPHATE SERPL-MCNC: 3.7 MG/DL (ref 2.4–4.7)
PLATELET # BLD AUTO: 230 THOU/MM3 (ref 130–400)
PMV BLD AUTO: 11 FL (ref 9.4–12.4)
POTASSIUM SERPL-SCNC: 3.1 MEQ/L (ref 3.5–5.2)
PROT SERPL-MCNC: 5.9 G/DL (ref 6.1–8)
RBC # BLD AUTO: 3.01 MILL/MM3 (ref 4.2–5.4)
SODIUM SERPL-SCNC: 132 MEQ/L (ref 135–145)
WBC # BLD AUTO: 12.3 THOU/MM3 (ref 4.8–10.8)

## 2024-10-26 PROCEDURE — 2580000003 HC RX 258: Performed by: STUDENT IN AN ORGANIZED HEALTH CARE EDUCATION/TRAINING PROGRAM

## 2024-10-26 PROCEDURE — 6370000000 HC RX 637 (ALT 250 FOR IP): Performed by: NURSE PRACTITIONER

## 2024-10-26 PROCEDURE — 84100 ASSAY OF PHOSPHORUS: CPT

## 2024-10-26 PROCEDURE — 6370000000 HC RX 637 (ALT 250 FOR IP): Performed by: STUDENT IN AN ORGANIZED HEALTH CARE EDUCATION/TRAINING PROGRAM

## 2024-10-26 PROCEDURE — 6360000002 HC RX W HCPCS: Performed by: STUDENT IN AN ORGANIZED HEALTH CARE EDUCATION/TRAINING PROGRAM

## 2024-10-26 PROCEDURE — 94761 N-INVAS EAR/PLS OXIMETRY MLT: CPT

## 2024-10-26 PROCEDURE — 99223 1ST HOSP IP/OBS HIGH 75: CPT | Performed by: NURSE PRACTITIONER

## 2024-10-26 PROCEDURE — 2580000003 HC RX 258

## 2024-10-26 PROCEDURE — 6360000002 HC RX W HCPCS: Performed by: INTERNAL MEDICINE

## 2024-10-26 PROCEDURE — 2700000000 HC OXYGEN THERAPY PER DAY

## 2024-10-26 PROCEDURE — 85025 COMPLETE CBC W/AUTO DIFF WBC: CPT

## 2024-10-26 PROCEDURE — 2060000000 HC ICU INTERMEDIATE R&B

## 2024-10-26 PROCEDURE — 82948 REAGENT STRIP/BLOOD GLUCOSE: CPT

## 2024-10-26 PROCEDURE — 83735 ASSAY OF MAGNESIUM: CPT

## 2024-10-26 PROCEDURE — 80053 COMPREHEN METABOLIC PANEL: CPT

## 2024-10-26 PROCEDURE — 99232 SBSQ HOSP IP/OBS MODERATE 35: CPT | Performed by: INTERNAL MEDICINE

## 2024-10-26 PROCEDURE — 36415 COLL VENOUS BLD VENIPUNCTURE: CPT

## 2024-10-26 PROCEDURE — 80400 ACTH STIMULATION PANEL: CPT

## 2024-10-26 PROCEDURE — 6370000000 HC RX 637 (ALT 250 FOR IP)

## 2024-10-26 PROCEDURE — 82533 TOTAL CORTISOL: CPT

## 2024-10-26 PROCEDURE — 2580000003 HC RX 258: Performed by: INTERNAL MEDICINE

## 2024-10-26 RX ORDER — DEXTROSE MONOHYDRATE AND SODIUM CHLORIDE 5; .9 G/100ML; G/100ML
INJECTION, SOLUTION INTRAVENOUS CONTINUOUS
Status: DISCONTINUED | OUTPATIENT
Start: 2024-10-26 | End: 2024-10-27

## 2024-10-26 RX ORDER — POTASSIUM CHLORIDE 1500 MG/1
20 TABLET, EXTENDED RELEASE ORAL ONCE
Status: COMPLETED | OUTPATIENT
Start: 2024-10-26 | End: 2024-10-26

## 2024-10-26 RX ADMIN — DEXTROSE AND SODIUM CHLORIDE: 5; 900 INJECTION, SOLUTION INTRAVENOUS at 14:15

## 2024-10-26 RX ADMIN — COSYNTROPIN 250 MCG: 0.25 INJECTION, POWDER, LYOPHILIZED, FOR SOLUTION INTRAMUSCULAR; INTRAVENOUS at 10:42

## 2024-10-26 RX ADMIN — LEVETIRACETAM 500 MG: 100 INJECTION INTRAVENOUS at 14:16

## 2024-10-26 RX ADMIN — SODIUM CHLORIDE, PRESERVATIVE FREE 10 ML: 5 INJECTION INTRAVENOUS at 19:52

## 2024-10-26 RX ADMIN — ATORVASTATIN CALCIUM 40 MG: 40 TABLET, FILM COATED ORAL at 18:06

## 2024-10-26 RX ADMIN — CHLORHEXIDINE GLUCONATE, 0.12% ORAL RINSE 15 ML: 1.2 SOLUTION DENTAL at 19:52

## 2024-10-26 RX ADMIN — APIXABAN 5 MG: 5 TABLET, FILM COATED ORAL at 19:52

## 2024-10-26 RX ADMIN — DEXTROSE AND SODIUM CHLORIDE: 5; 450 INJECTION, SOLUTION INTRAVENOUS at 07:17

## 2024-10-26 RX ADMIN — SODIUM CHLORIDE, PRESERVATIVE FREE 10 ML: 5 INJECTION INTRAVENOUS at 09:05

## 2024-10-26 RX ADMIN — POTASSIUM CHLORIDE 20 MEQ: 1500 TABLET, EXTENDED RELEASE ORAL at 09:05

## 2024-10-26 RX ADMIN — EPOETIN ALFA-EPBX 6000 UNITS: 4000 INJECTION, SOLUTION INTRAVENOUS; SUBCUTANEOUS at 18:18

## 2024-10-26 ASSESSMENT — PAIN SCALES - GENERAL
PAINLEVEL_OUTOF10: 0
PAINLEVEL_OUTOF10: 0

## 2024-10-26 NOTE — PLAN OF CARE
Problem: Chronic Conditions and Co-morbidities  Goal: Patient's chronic conditions and co-morbidity symptoms are monitored and maintained or improved  Outcome: Progressing  Flowsheets (Taken 10/26/2024 0242)  Care Plan - Patient's Chronic Conditions and Co-Morbidity Symptoms are Monitored and Maintained or Improved:   Monitor and assess patient's chronic conditions and comorbid symptoms for stability, deterioration, or improvement   Collaborate with multidisciplinary team to address chronic and comorbid conditions and prevent exacerbation or deterioration   Update acute care plan with appropriate goals if chronic or comorbid symptoms are exacerbated and prevent overall improvement and discharge     Problem: Discharge Planning  Goal: Discharge to home or other facility with appropriate resources  Outcome: Progressing  Flowsheets (Taken 10/26/2024 0242)  Discharge to home or other facility with appropriate resources: Identify barriers to discharge with patient and caregiver     Problem: Pain  Goal: Verbalizes/displays adequate comfort level or baseline comfort level  Outcome: Progressing  Flowsheets (Taken 10/26/2024 0242)  Verbalizes/displays adequate comfort level or baseline comfort level:   Encourage patient to monitor pain and request assistance   Assess pain using appropriate pain scale   Administer analgesics based on type and severity of pain and evaluate response   Implement non-pharmacological measures as appropriate and evaluate response   Consider cultural and social influences on pain and pain management  Note: Continuing to monitor pain and discomfort.  Monitoring pain level on scale of 0-10. Non- pharmacological measures encouraged to reduce discomfort/pain.  PRN pain meds administeration continues when/if applicable as ordered by physician.       Problem: Safety - Adult  Goal: Free from fall injury  Outcome: Progressing  Flowsheets (Taken 10/26/2024 0242)  Free From Fall Injury: Instruct

## 2024-10-26 NOTE — SIGNIFICANT EVENT
Patient was successfully extubated at about 4:00 PM on 10/25/2024.  Patient's son, who is POA, presented to the ICU and I personally had a goals of care conversation with him.  Son emphasized patient remains FULL CODE.  He also demonstrated understanding patient's present health status and agrees with plan for continuous medical management at this time.  This conversation was important, understanding patient had previously been leaving AGAINST MEDICAL ADVICE during previous admissions.  Patient was noted to be confused during encounter, and there was consensus patient did not have capacity to make decisions at this time.  Consequently, patient cannot choose to leave AGAINST MEDICAL ADVICE until there is remarkable and noticeable improvement in mental status.    Yury Jaimes MD PGY2  Critical Care Team

## 2024-10-26 NOTE — PROGRESS NOTES
Patient refusing to speak with . Patient refusing water/ice chips and to take her medications.   Explained to patient that with her HX of strokes that her blood thinner medications are really important.   Patient is alert. Patient is following commands. Patient did not answer any questions except when I asked if she wanted me to call her daughter.     Patient informed that she will be transferring out of the ICU today to a stepdown unit. I told patient that I would call her family and update them.     Updated resident physician that brianna is refusing to take any medications this morning. He speaks a little Ivorian and went to talk to her. She told him she did not want to take her medications because she didn't know what they were for.

## 2024-10-26 NOTE — PLAN OF CARE
Problem: Chronic Conditions and Co-morbidities  Goal: Patient's chronic conditions and co-morbidity symptoms are monitored and maintained or improved  Outcome: Progressing     Problem: Discharge Planning  Goal: Discharge to home or other facility with appropriate resources  Outcome: Progressing     Problem: Pain  Goal: Verbalizes/displays adequate comfort level or baseline comfort level  Outcome: Progressing     Problem: Safety - Adult  Goal: Free from fall injury  Outcome: Progressing     Problem: ABCDS Injury Assessment  Goal: Absence of physical injury  Outcome: Progressing     Problem: Neurosensory - Adult  Goal: Achieves stable or improved neurological status  Outcome: Progressing  Goal: Absence of seizures  Outcome: Progressing  Goal: Remains free of injury related to seizures activity  Outcome: Progressing     Problem: Respiratory - Adult  Goal: Achieves optimal ventilation and oxygenation  Outcome: Progressing     Problem: Cardiovascular - Adult  Goal: Maintains optimal cardiac output and hemodynamic stability  Outcome: Progressing     Problem: Skin/Tissue Integrity - Adult  Goal: Skin integrity remains intact  Outcome: Progressing  Goal: Oral mucous membranes remain intact  Outcome: Progressing     Problem: Gastrointestinal - Adult  Goal: Maintains or returns to baseline bowel function  Outcome: Progressing  Goal: Maintains adequate nutritional intake  Outcome: Progressing     Problem: Metabolic/Fluid and Electrolytes - Adult  Goal: Electrolytes maintained within normal limits  Outcome: Progressing  Goal: Hemodynamic stability and optimal renal function maintained  Outcome: Progressing  Goal: Glucose maintained within prescribed range  Outcome: Progressing

## 2024-10-26 NOTE — PROGRESS NOTES
[facility]   Nephrology progress note  10/26/2024, 11:31 AM      Pt Name:    Shahrzad Gregory  MRN:     [id number]     YOB: 1970  Admit Date:    10/23/2024 12:05 PM    Chief Complaint: Nephrology following for ESRD    Subjective:  Patient was seen and examined this morning  Extubated  On dextrose drip due to hypoglycemia per ICU      Objective:  24HR INTAKE/OUTPUT:    Intake/Output Summary (Last 24 hours) at 10/26/2024 1131  Last data filed at 10/26/2024 1120  Gross per 24 hour   Intake 2715.67 ml   Output 3500 ml   Net -784.33 ml         I/O last 3 completed shifts:  In: 4771.9 [I.V.:3437.9; NG/GT:934]  Out: 3500 [Urine:500]  I/O this shift:  In: 1072.8 [I.V.:1072.8]  Out: -    Admission weight: 85.8 kg (189 lb 2.5 oz)  Wt Readings from Last 3 Encounters:   10/26/24 91.5 kg (201 lb 11.5 oz)   10/21/24 86.7 kg (191 lb 2.2 oz)   10/18/24 85 kg (187 lb 6.3 oz)        Vitals :   Vitals:    10/26/24 0900 10/26/24 0916 10/26/24 0930 10/26/24 1000   BP: (!) 138/44  (!) 147/48 (!) 142/78   Pulse: 76  75 73   Resp: 20  21 16   Temp:       TempSrc:       SpO2: 96% 98%     Weight:       Height:           Physical examination  General Appearance: Awake, extubated  Neck: No JVD visibly noted  Lungs: Diminished, poor effort, no use of accessory muscles  GI: soft  Extremities: ++ LE edema    Medications:  Infusion:    dextrose 5 % and 0.45 % NaCl 100 mL/hr at 10/26/24 0929    dextrose Stopped (10/25/24 1019)    sodium chloride       Meds:    [Held by provider] insulin glargine  8 Units SubCUTAneous Daily    [Held by provider] insulin lispro  0-8 Units SubCUTAneous 4x Daily AC & HS    [Held by provider] bumetanide  1 mg IntraVENous Daily    epoetin george-epbx  6,000 Units SubCUTAneous Once per day on Tuesday Thursday Saturday    levETIRAcetam  500 mg IntraVENous Q12H    chlorhexidine  15 mL Mouth/Throat BID    sodium chloride flush  5-40 mL IntraVENous 2 times per day    apixaban  5 mg  Oral BID    aspirin  81 mg Oral Daily    atorvastatin  40 mg Oral QPM    [Held by provider] carvedilol  25 mg Oral BID     clopidogrel  75 mg Oral Daily    folic acid  1 mg Oral Daily    [Held by provider] hydrALAZINE  25 mg Oral TID    [Held by provider] losartan  100 mg Oral Daily    [Held by provider] metOLazone  2.5 mg Oral Daily    [Held by provider] sertraline  50 mg Oral QPM    [Held by provider] sevelamer  800 mg Oral TID      Meds prn: glucose, dextrose bolus **OR** dextrose bolus, dextrose, sodium chloride flush, sodium chloride, polyethylene glycol, acetaminophen **OR** acetaminophen, promethazine **OR** ondansetron, glucagon (rDNA), hydrALAZINE     Lab Data :  CBC:   Recent Labs     10/24/24  0354 10/25/24  0825 10/26/24  0317   WBC 10.4 13.8* 12.3*   HGB 8.6* 8.0* 8.2*   HCT 28.2* 25.5* 27.1*    261 230     CMP:  Recent Labs     10/24/24  0354 10/25/24  0825 10/26/24  0317    130* 132*   K 3.5 3.5 3.1*    91* 94*   CO2 23 24 25   BUN 50* 70* 31*   CREATININE 3.4* 3.8* 2.4*   GLUCOSE 34* 69* 124*   CALCIUM 8.2* 7.5* 7.4*   MG 1.7 1.7 1.7   PHOS 4.5 5.6* 3.7     Hepatic:   Recent Labs     10/23/24  2011 10/25/24  0825 10/26/24  0317   AST 34 19 18   ALT 20 15 13   BILITOT 0.3 0.2* 0.2*   ALKPHOS 168* 167* 135*         Assessment and Plan:  ESRD on HD  Continue dialysis Mondays, Wednesdays and Fridays.  No acute need for dialysis today   Anemia in ESRD.  Continue Retacrit  Seizure  Hypokalemia.  Was replaced.  Recheck this afternoon  Hyponatremia due to hypotonic fluids.   Patient is on hypotonic fluids due to hypoglycemia.  If fluids need to continue then may need to consider changing to D5NS  Hypertension  Hypoglycemia.  On dextrose drip  History of CVA  Diabetes mellitus  Paroxysmal atrial fibrillation  History of noncompliance  Pleural effusion      Discussed with ICU  Masoud Osborn MD  [facility]    This report has been created using voice recognition

## 2024-10-26 NOTE — PROGRESS NOTES
CRITICAL CARE PROGRESS NOTE      Patient:  Shahrzad Gregory    Unit/Bed:4D-03/003-A  YOB: 1970  MRN: [id number]   PCP: Trang Maciel DO  Date of Admission: 10/23/2024  Chief Complaint:- Altered mental status    Assessment and Plan:    New Onset generalized Seizure:    Patient presented to  [facility] ED for hemodialysis, while 2 hours into dialysis, she had witness seizure activity with altered mental status, Rapid response was called which was later converted to Code blue (though patient did not loose pulse or did not have arrhthymias but needed higher medical attention). During Seizure activity, Patient bit her tongue and was bleeding, Patient was intubated for respiratory protection. Patient was transferred to ICU for further management.    Cerebell (10/24/24) showed 2 generalized seizures lasting approximately 5 to 10 minutes example at 6: 02 and 6:52 am with medium amplitude 1 to 2 Hz rhythmic spike and wave discharges with evaluation of the background from theta frequencies to delta frequencies with an onset and offset.  Patient was started on EEG 24 hours on 10/24 - 10/25, limited there were no events recorded, noted severe encephalopathy.  Otherwise no abnormalities.  Patient is already on Propofol  Patient was started on Keppra 500 IV BID  CTH with out acute intracranial finding   MRI without contrast considering patient is ESRD, MRI 10/25 showed areas of encephalomalacia in the left/mildly in the right occipital lobes and the left posterior temporal lobe with susceptibility artifact noted increased signal intensity in the white matter and maryan likely secondary to ischemic changes with no evidence of acute infarct, small infarct in the right and left cerebral hemispheres.      ESRD:    Patient is ESRD, from [place], Moved here and has insurance issues, Has been coming to [facility] ED for as needed dialysis.  Nephrology consulted, patient received HD 10/23   HD per

## 2024-10-26 NOTE — H&P
Hospitalist Progress Note      Patient:  Shahrzad Gregory    Unit/Bed:4D-03/003-A  YOB: 1970  MRN: [id number]   Acct: [id number]   PCP: Trang Maciel DO  Date of Admission: 10/23/2024    ASSESSMENT AND PLAN    #Acute toxic metabolic encephalopathy.  # ?New onset generalized seizure.  -Patient presented to [facility] ED for hemodialysis, while 2 hours into dialysis, she had witness seizure activity with altered mental status, Rapid response was called which was later converted to Code blue (though patient did not loose pulse or did not have arrhthymias but needed higher medical attention).   -During Seizure activity, Patient bit her tongue and was bleeding.  -Patient was intubated for respiratory protection and  was transferred to ICU for further management.   -As per ICU note patient had Cerebell 10/24/24 that showed generalized seizure lasting approximately 5-10 minutes.  -Patient was started on EEG 24 hours on 10/24 - 10/25, limited there were no events recorded, noted severe encephalopathy.  Otherwise no abnormalities.  -Patient was on propofol and was started on Keppra .  -CT of head showed no acute findings.  -MRI of brain showed areas of encephalomalacia in the left/mildly in the right occipital lobes and the left posterior temporal lobe with susceptibility artifact noted increased signal intensity in the white matter and maryan likely secondary to ischemic changes with no evidence of acute infarct, small infarct in the right and left cerebral hemispheres.   -EEG was abnormal due to diffuse delta slowing admixed with rare theta frequencies suggestive of severe encephalopathy   -No note from neurology.Will consult.    #ESRD on HD:    -Patient is from [place], Moved here and has insurance issues, Has been coming to [facility] ED for as needed dialysis.  -Nephrology following.Dialysis as per nephrology.     #Hypoglycemia  and/or weight-based dosing when appropriate to reduce radiation to a low as reasonably achievable.    XR CHEST PORTABLE    Result Date: 10/23/2024  PROCEDURE: XR CHEST PORTABLE CLINICAL INFORMATION: Endotracheal tube placement TECHNIQUE: Mobile AP chest radiograph. COMPARISON: Mobile AP chest radiograph 10/23/2024 at 4:23 p.m. FINDINGS: The tip of an endotracheal tube is now approximately 2.7 cm superior to the angus. Nasogastric tube and right-sided hemodialysis catheter are in stable position. Median sternotomy wires are again noted. There is mild stable enlargement of the cardiac silhouette. The left costophrenic angle is blunted. Degenerative changes in the thoracic spine are poorly visualized.     1. Left-sided pleural effusion. 2. Mild stable cardiomegaly. **This report has been created using voice recognition software. It may contain minor errors which are inherent in voice recognition technology.** Electronically signed by Dr. Nicolas Hudson    XR CHEST PORTABLE    Result Date: 10/23/2024  PROCEDURE: XR CHEST PORTABLE CLINICAL INFORMATION: Intubation TECHNIQUE: Mobile AP chest radiograph. COMPARISON: Mobile AP chest radiograph 10/20/2024 FINDINGS: The tip of an endotracheal tube is at the angus. A nasogastric tube courses into the left upper abdomen. The tip is not visible but the sidehole is distal to the gastroesophageal junction. A right-sided hemodialysis catheter is in stable position. Median sternotomy wires are again noted. There is mild stable enlargement of the cardiac silhouette. The left costophrenic angle is blunted. Degenerative changes in the thoracic spine are poorly visualized.     1. Small left-sided pleural effusion. 2. Mild stable cardiomegaly. **This report has been created using voice recognition software. It may contain minor errors which are inherent in voice recognition technology.** Electronically signed by Dr. Nicolas Hudson      Electronically signed by Reji Starr MD on

## 2024-10-27 LAB
ALBUMIN SERPL BCG-MCNC: 3.1 G/DL (ref 3.5–5.1)
ALP SERPL-CCNC: 138 U/L (ref 38–126)
ALT SERPL W/O P-5'-P-CCNC: 14 U/L (ref 11–66)
ANION GAP SERPL CALC-SCNC: 14 MEQ/L (ref 8–16)
AST SERPL-CCNC: 17 U/L (ref 5–40)
BASOPHILS ABSOLUTE: 0 THOU/MM3 (ref 0–0.1)
BASOPHILS NFR BLD AUTO: 0.7 %
BILIRUB SERPL-MCNC: 0.3 MG/DL (ref 0.3–1.2)
BUN SERPL-MCNC: 37 MG/DL (ref 7–22)
CALCIUM SERPL-MCNC: 8.1 MG/DL (ref 8.5–10.5)
CHLORIDE SERPL-SCNC: 96 MEQ/L (ref 98–111)
CO2 SERPL-SCNC: 25 MEQ/L (ref 23–33)
CREAT SERPL-MCNC: 3.1 MG/DL (ref 0.4–1.2)
DEPRECATED RDW RBC AUTO: 49 FL (ref 35–45)
EOSINOPHIL NFR BLD AUTO: 5.7 %
EOSINOPHILS ABSOLUTE: 0.4 THOU/MM3 (ref 0–0.4)
ERYTHROCYTE [DISTWIDTH] IN BLOOD BY AUTOMATED COUNT: 15.3 % (ref 11.5–14.5)
GFR SERPL CREATININE-BSD FRML MDRD: 17 ML/MIN/1.73M2
GLUCOSE BLD STRIP.AUTO-MCNC: 122 MG/DL (ref 70–108)
GLUCOSE BLD STRIP.AUTO-MCNC: 126 MG/DL (ref 70–108)
GLUCOSE BLD STRIP.AUTO-MCNC: 131 MG/DL (ref 70–108)
GLUCOSE BLD STRIP.AUTO-MCNC: 140 MG/DL (ref 70–108)
GLUCOSE BLD STRIP.AUTO-MCNC: 146 MG/DL (ref 70–108)
GLUCOSE SERPL-MCNC: 108 MG/DL (ref 70–108)
HCT VFR BLD AUTO: 24.5 % (ref 37–47)
HGB BLD-MCNC: 7.7 GM/DL (ref 12–16)
IMM GRANULOCYTES # BLD AUTO: 0.01 THOU/MM3 (ref 0–0.07)
IMM GRANULOCYTES NFR BLD AUTO: 0.1 %
LYMPHOCYTES ABSOLUTE: 1.6 THOU/MM3 (ref 1–4.8)
LYMPHOCYTES NFR BLD AUTO: 23.4 %
MCH RBC QN AUTO: 27.2 PG (ref 26–33)
MCHC RBC AUTO-ENTMCNC: 31.4 GM/DL (ref 32.2–35.5)
MCV RBC AUTO: 86.6 FL (ref 81–99)
MONOCYTES ABSOLUTE: 0.8 THOU/MM3 (ref 0.4–1.3)
MONOCYTES NFR BLD AUTO: 11 %
NEUTROPHILS ABSOLUTE: 4.1 THOU/MM3 (ref 1.8–7.7)
NEUTROPHILS NFR BLD AUTO: 59.1 %
NRBC BLD AUTO-RTO: 0 /100 WBC
PLATELET # BLD AUTO: 249 THOU/MM3 (ref 130–400)
PMV BLD AUTO: 10.6 FL (ref 9.4–12.4)
POTASSIUM SERPL-SCNC: 3.2 MEQ/L (ref 3.5–5.2)
PROT SERPL-MCNC: 6.3 G/DL (ref 6.1–8)
RBC # BLD AUTO: 2.83 MILL/MM3 (ref 4.2–5.4)
SODIUM SERPL-SCNC: 135 MEQ/L (ref 135–145)
WBC # BLD AUTO: 7 THOU/MM3 (ref 4.8–10.8)

## 2024-10-27 PROCEDURE — 6370000000 HC RX 637 (ALT 250 FOR IP)

## 2024-10-27 PROCEDURE — 2580000003 HC RX 258: Performed by: INTERNAL MEDICINE

## 2024-10-27 PROCEDURE — 51701 INSERT BLADDER CATHETER: CPT

## 2024-10-27 PROCEDURE — 2060000000 HC ICU INTERMEDIATE R&B

## 2024-10-27 PROCEDURE — 99232 SBSQ HOSP IP/OBS MODERATE 35: CPT | Performed by: INTERNAL MEDICINE

## 2024-10-27 PROCEDURE — 6360000002 HC RX W HCPCS: Performed by: INTERNAL MEDICINE

## 2024-10-27 PROCEDURE — 51798 US URINE CAPACITY MEASURE: CPT

## 2024-10-27 PROCEDURE — 36415 COLL VENOUS BLD VENIPUNCTURE: CPT

## 2024-10-27 PROCEDURE — 6360000002 HC RX W HCPCS

## 2024-10-27 PROCEDURE — 6360000002 HC RX W HCPCS: Performed by: STUDENT IN AN ORGANIZED HEALTH CARE EDUCATION/TRAINING PROGRAM

## 2024-10-27 PROCEDURE — 85025 COMPLETE CBC W/AUTO DIFF WBC: CPT

## 2024-10-27 PROCEDURE — 6370000000 HC RX 637 (ALT 250 FOR IP): Performed by: INTERNAL MEDICINE

## 2024-10-27 PROCEDURE — 80053 COMPREHEN METABOLIC PANEL: CPT

## 2024-10-27 PROCEDURE — 2580000003 HC RX 258

## 2024-10-27 PROCEDURE — 6370000000 HC RX 637 (ALT 250 FOR IP): Performed by: NURSE PRACTITIONER

## 2024-10-27 PROCEDURE — 6370000000 HC RX 637 (ALT 250 FOR IP): Performed by: STUDENT IN AN ORGANIZED HEALTH CARE EDUCATION/TRAINING PROGRAM

## 2024-10-27 PROCEDURE — 82948 REAGENT STRIP/BLOOD GLUCOSE: CPT

## 2024-10-27 PROCEDURE — 99233 SBSQ HOSP IP/OBS HIGH 50: CPT | Performed by: INTERNAL MEDICINE

## 2024-10-27 RX ORDER — DEXTROSE MONOHYDRATE 50 MG/ML
INJECTION, SOLUTION INTRAVENOUS CONTINUOUS
Status: DISCONTINUED | OUTPATIENT
Start: 2024-10-27 | End: 2024-10-28

## 2024-10-27 RX ORDER — LORAZEPAM 2 MG/ML
0.5 INJECTION INTRAMUSCULAR ONCE
Status: DISCONTINUED | OUTPATIENT
Start: 2024-10-27 | End: 2024-10-27

## 2024-10-27 RX ORDER — LORAZEPAM 2 MG/ML
0.5 INJECTION INTRAMUSCULAR ONCE
Status: COMPLETED | OUTPATIENT
Start: 2024-10-27 | End: 2024-10-27

## 2024-10-27 RX ORDER — LEVETIRACETAM 100 MG/ML
500 SOLUTION ORAL 2 TIMES DAILY
Status: DISCONTINUED | OUTPATIENT
Start: 2024-10-27 | End: 2024-11-01 | Stop reason: HOSPADM

## 2024-10-27 RX ORDER — HALOPERIDOL 5 MG/ML
2 INJECTION INTRAMUSCULAR ONCE
Status: COMPLETED | OUTPATIENT
Start: 2024-10-27 | End: 2024-10-27

## 2024-10-27 RX ORDER — POTASSIUM CHLORIDE 1500 MG/1
40 TABLET, EXTENDED RELEASE ORAL ONCE
Status: COMPLETED | OUTPATIENT
Start: 2024-10-27 | End: 2024-10-27

## 2024-10-27 RX ADMIN — HALOPERIDOL LACTATE 2 MG: 5 INJECTION, SOLUTION INTRAMUSCULAR at 14:03

## 2024-10-27 RX ADMIN — DEXTROSE MONOHYDRATE: 50 INJECTION, SOLUTION INTRAVENOUS at 12:43

## 2024-10-27 RX ADMIN — LEVETIRACETAM 500 MG: 100 INJECTION INTRAVENOUS at 00:19

## 2024-10-27 RX ADMIN — POTASSIUM CHLORIDE 40 MEQ: 1500 TABLET, EXTENDED RELEASE ORAL at 12:44

## 2024-10-27 RX ADMIN — APIXABAN 5 MG: 5 TABLET, FILM COATED ORAL at 08:42

## 2024-10-27 RX ADMIN — SODIUM CHLORIDE, PRESERVATIVE FREE 10 ML: 5 INJECTION INTRAVENOUS at 08:42

## 2024-10-27 RX ADMIN — LEVETIRACETAM 500 MG: 500 SOLUTION ORAL at 20:54

## 2024-10-27 RX ADMIN — ASPIRIN 81 MG: 81 TABLET, COATED ORAL at 08:42

## 2024-10-27 RX ADMIN — CLOPIDOGREL BISULFATE 75 MG: 75 TABLET ORAL at 08:42

## 2024-10-27 RX ADMIN — CHLORHEXIDINE GLUCONATE, 0.12% ORAL RINSE 15 ML: 1.2 SOLUTION DENTAL at 08:43

## 2024-10-27 RX ADMIN — CHLORHEXIDINE GLUCONATE, 0.12% ORAL RINSE 15 ML: 1.2 SOLUTION DENTAL at 20:54

## 2024-10-27 RX ADMIN — FOLIC ACID 1 MG: 1 TABLET ORAL at 08:42

## 2024-10-27 RX ADMIN — HYDRALAZINE HYDROCHLORIDE 5 MG: 20 INJECTION INTRAMUSCULAR; INTRAVENOUS at 20:56

## 2024-10-27 RX ADMIN — ATORVASTATIN CALCIUM 40 MG: 40 TABLET, FILM COATED ORAL at 16:48

## 2024-10-27 RX ADMIN — APIXABAN 5 MG: 5 TABLET, FILM COATED ORAL at 20:54

## 2024-10-27 RX ADMIN — HYDRALAZINE HYDROCHLORIDE 5 MG: 20 INJECTION INTRAMUSCULAR; INTRAVENOUS at 13:03

## 2024-10-27 RX ADMIN — LORAZEPAM 0.5 MG: 2 INJECTION INTRAMUSCULAR; INTRAVENOUS at 10:41

## 2024-10-27 RX ADMIN — LEVETIRACETAM 500 MG: 100 INJECTION INTRAVENOUS at 12:45

## 2024-10-27 NOTE — PROGRESS NOTES
Patient bladder scan  with volume of 423ml of urine in her bladder. RN straight cath 400ml. Night nurse notified  to continue to monitor for urinary retention.

## 2024-10-27 NOTE — CONSULTS
Neurology Consult Note    Date:10/26/2024       Room:[facility]/025-A  Patient Name:Shahrzad Gregory     YOB: 1970     Age:54 y.o.    Requesting Physician: No att. providers found     Reason for Consult:  Evaluate for Seizure Medication Management      Chief Complaint:   Chief Complaint   Patient presents with    Needs dialysis        Subjective       HPI -Taken from H&P -10//26/24 after Transfer out of ICU. Admission Date was 10/23.    History Of Present Illness: Shahrzad Gregory is a 54 y.o. female with a history of  ESRD on hemodialysis, HFrEF 30%, Afib,  iron deficiency anemia, non-insulin-dependent type 2 diabetes, CVA, hypertension, hyperlipidemia, left ICA stenosis, and noncompliance with medical therapy who presented to [facility] on 10/23/2024 for dialysis.   Per records, Patient has been coming to [facility] ED for dialysis sessions as needed, Patient has some insurance issues and has not been established to any dialysis unit. Patient has been very non-compliant to medical advice and leaves AMA most of the times.  This time patient presented to ED for regular dialysis, while 2 hours into dialysis, she had witness seizure activity with altered mental status, Rapid response was called which was later converted to Code blue (though patient did not loose pulse or did not have arrhthymias but needed higher medical attention). During Seizure activity, Patient bit her tongue and was bleeding, Patient was intubated for respiratory protection. Patient was transferred to ICU for further management.       10/26: Patient was seen and examined this morning, Patient is awake and oriented though she is not very responsive verbally. She will make eye contact and nod/shake her head. She is off the levophed and Precedex. She is stable to be transferred from the ICU.  Subjective (past 24 hours): Patient seen and examined at bed side.Extubated this AM.Denies any complaints.No acute  90.0    261 230     CHEMISTRIES:  Recent Labs     10/24/24  0354 10/25/24  0825 10/26/24  0317    130* 132*   K 3.5 3.5 3.1*    91* 94*   CO2 23 24 25   BUN 50* 70* 31*   CREATININE 3.4* 3.8* 2.4*   GLUCOSE 34* 69* 124*   PHOS 4.5 5.6* 3.7   MG 1.7 1.7 1.7     COAGULATION STUDIES:No results for input(s): \"PROTIME\", \"INR\", \"APTT\" in the last 72 hours.  LIVER PROFILE:  Recent Labs     10/25/24  0825 10/26/24  0317   AST 19 18   ALT 15 13   BILITOT 0.2* 0.2*   ALKPHOS 167* 135*     CHOLESTEROL AND A1C:  Recent Labs     10/25/24  0825   TRIG 27      Imaging Last 24 Hours:  MRI BRAIN WO CONTRAST    Result Date: 10/25/2024  PROCEDURE: MRI BRAIN WO CONTRAST CLINICAL INFORMATION New onset Seizures. COMPARISON: CT scan of the brain dated 10/23/2024.. TECHNIQUE: Multiplanar and multiple spin echo MRI images were obtained of the brain without contrast. FINDINGS: The diffusion-weighted images are normal.  The brain volume is reduced. There is encephalomalacia in the left and to a lesser extent right occipital lobes and in the left posterior temporal lobe. There is increased signal intensity in the white matter and maryan most likely representing ischemic changes. There are small old infarcts in the right and left cerebellar hemispheres. There are no intra-or extra-axial collections.  There is no hydrocephalus, midline shift or mass effect. There is mineralization in the medial aspects of the basal ganglia bilaterally. There are areas of susceptibility artifact in the right and left occipital lobes and in the left posterior temporal lobe. The major intracranial vascular flow voids are present. The midline craniocervical junction structures are normal.  The pituitary gland and brainstem are normal.     1. Areas of encephalomalacia in the left and to a lesser extent right occipital lobes and in the left posterior temporal lobe. There is associated susceptibility artifact. 2. Increased signal intensity in the white

## 2024-10-27 NOTE — PROGRESS NOTES
Patient very agitated this morning . Trying to  pull  on her IV access , controlled with a lot of redirection. Patient had not made urine and was bladder scanned at bedside with a volume of 761 ml of urinary retention.  Patient was straight cath  with an amount of 600ml out at this time     We will continue to monitor for s/s of urinary retention      Given Ativan 0.5 IM due to agitation and yelling continuously .     Sitter at bedside for redirection and observation this time .

## 2024-10-27 NOTE — PROGRESS NOTES
[facility]   Nephrology progress note  10/27/2024, 12:22 PM      Pt Name:    Shahrzad Gregory  MRN:     [id number]     YOB: 1970  Admit Date:    10/23/2024 12:05 PM    Chief Complaint: Nephrology following for ESRD    Subjective:  Patient was seen and examined this morning  Agitated  Tearful at times  D/W family yesterday  Nephrology following for ESRD mgmt  Had HD on 10/25      Objective:  24HR INTAKE/OUTPUT:    Intake/Output Summary (Last 24 hours) at 10/27/2024 1222  Last data filed at 10/27/2024 1007  Gross per 24 hour   Intake 1055.9 ml   Output 1350 ml   Net -294.1 ml         I/O last 3 completed shifts:  In: 2917.8 [I.V.:2917.8]  Out: 1250 [Urine:1250]  I/O this shift:  In: -   Out: 600 [Urine:600]   Admission weight: 85.8 kg (189 lb 2.5 oz)  Wt Readings from Last 3 Encounters:   10/27/24 91.5 kg (201 lb 11.5 oz)   10/21/24 86.7 kg (191 lb 2.2 oz)   10/18/24 85 kg (187 lb 6.3 oz)        Vitals :   Vitals:    10/27/24 0321 10/27/24 0717 10/27/24 0759 10/27/24 1131   BP: (!) 147/57  (!) 152/76 (!) 174/75   Pulse: 65  69 66   Resp: 19  20 18   Temp: 98.1 °F (36.7 °C)  97.9 °F (36.6 °C) 97.7 °F (36.5 °C)   TempSrc: Axillary  Axillary Axillary   SpO2: 96%  97% 98%   Weight:  91.5 kg (201 lb 11.5 oz)     Height:           Physical examination  General Appearance: Agitated  Neck: No JVD visibly noted  Lungs: Diminished, poor effort, no use of accessory muscles  GI: soft  Extremities: ++ LE edema    Medications:  Infusion:    dextrose 5 % and 0.9 % NaCl 40 mL/hr at 10/26/24 1415    dextrose Stopped (10/25/24 1019)    sodium chloride       Meds:    [Held by provider] insulin glargine  8 Units SubCUTAneous Daily    [Held by provider] insulin lispro  0-8 Units SubCUTAneous 4x Daily AC & HS    [Held by provider] bumetanide  1 mg IntraVENous Daily    epoetin george-epbx  6,000 Units SubCUTAneous Once per day on Tuesday Thursday Saturday    levETIRAcetam  500 mg IntraVENous Q12H  [facility]    This report has been created using voice recognition software. It may contain minor errors which are inherent in voice recognition technology

## 2024-10-27 NOTE — PROGRESS NOTES
[facility] Adult Intravenous to Oral Protocol    Keppra changed to PO per [facility] P&T IV to PO protocol.    Patient meets criteria based on the following:  Tolerating diet more advanced than clear liquids  Tolerating other oral medications  Not on vasopressors  No nausea/vomiting within past 24 hours  No active GI bleed  No gastrectomy, gastric outlet or bowel obstruction, ileus, malabsorption syndrome  No seizures for 72 hours (antiepileptics only)      Thank you,  Taye Witt [facility] 10/27/2024 5:17 PM

## 2024-10-27 NOTE — PLAN OF CARE
Problem: Chronic Conditions and Co-morbidities  Goal: Patient's chronic conditions and co-morbidity symptoms are monitored and maintained or improved  10/27/2024 0412 by Austin Martin RN  Outcome: Progressing  10/26/2024 1929 by Marietta Herr RN  Outcome: Progressing     Problem: Discharge Planning  Goal: Discharge to home or other facility with appropriate resources  10/27/2024 0412 by Austin Martin RN  Outcome: Progressing  10/26/2024 1929 by Marietta Herr RN  Outcome: Progressing     Problem: Pain  Goal: Verbalizes/displays adequate comfort level or baseline comfort level  10/27/2024 0412 by Austin Martin RN  Outcome: Progressing  10/26/2024 1929 by Marietta Herr RN  Outcome: Progressing     Problem: Safety - Adult  Goal: Free from fall injury  10/27/2024 0412 by Austin Martin RN  Outcome: Progressing  10/26/2024 1929 by Marietta Herr RN  Outcome: Progressing     Problem: ABCDS Injury Assessment  Goal: Absence of physical injury  10/27/2024 0412 by Austin Martin RN  Outcome: Progressing  10/26/2024 1929 by Marietta Herr RN  Outcome: Progressing     Problem: Nutrition Deficit:  Goal: Optimize nutritional status  10/27/2024 0412 by Austin Martin RN  Outcome: Progressing  10/26/2024 1929 by Marietta Herr RN  Outcome: Progressing     Problem: Neurosensory - Adult  Goal: Achieves stable or improved neurological status  10/27/2024 0412 by Austin Martin RN  Outcome: Progressing  10/26/2024 1929 by Marietta Herr RN  Outcome: Progressing  Goal: Absence of seizures  10/27/2024 0412 by Austin Martin RN  Outcome: Progressing  10/26/2024 1929 by Marietta Herr RN  Outcome: Progressing  Goal: Remains free of injury related to seizures activity  10/27/2024 0412 by Austin Martin RN  Outcome: Progressing  10/26/2024 1929 by Marietta Herr RN  Outcome: Progressing     Problem: Respiratory - Adult  Goal: Achieves optimal ventilation and oxygenation  10/27/2024 0412 by

## 2024-10-27 NOTE — PROGRESS NOTES
hemispheres.   4. Mild atrophy.               **This report has been created using voice recognition software. It may contain   minor errors which are inherent in voice recognition technology.**         Electronically signed by Dr. Kerri Hughes      CT HEAD WO CONTRAST   Final Result   1. Multiple remote infarcts. No acute intracranial finding.      This document has been electronically signed by: Jimmy Hernandez MD on    10/23/2024 09:54 PM      All CTs at this facility use dose modulation techniques and iterative    reconstructions, and/or weight-based dosing   when appropriate to reduce radiation to a low as reasonably achievable.      XR CHEST PORTABLE   Final Result   1. Left-sided pleural effusion.   2. Mild stable cardiomegaly.               **This report has been created using voice recognition software. It may contain   minor errors which are inherent in voice recognition technology.**            Electronically signed by Dr. Nicolas Hudson      XR CHEST PORTABLE   Final Result   1. Small left-sided pleural effusion.   2. Mild stable cardiomegaly.               **This report has been created using voice recognition software. It may contain   minor errors which are inherent in voice recognition technology.**            Electronically signed by Dr. Nicolas Hudson        CT HEAD WO CONTRAST    Result Date: 10/23/2024  CT head without contrast Comparison: CT/SR - CTA HEAD W WO CONTRAST - 10/19/2024 08:38 PM EDT CT/SR - CT HEAD WO CONTRAST - 10/19/2024 08:38 PM EDT Findings: Multiple bilateral parietal lobe and left occipital lobe infarcts. Moderate global cerebral volume loss and chronic microvascular ischemic changes. No acute intracranial hemorrhage or abnormal extra-axial fluid collection. No findings of mass effect or midline shift. The ventricles and basilar cisterns are patent. No CT evidence of acute large territory infarct. Orbital structures normal. Paranasal sinuses and mastoid air cells are within

## 2024-10-27 NOTE — PROGRESS NOTES
Patient had not made urine for over 4 hours (since shift start). Provider notified and bladder scan ordered.     , Isai, utilized to explain the purpose of the straight cath procedure and its importance. Patient then was cooperative and allowed myself and aide to position her to straight cath.      Bladder scan revealed 794mL. Patient straight-cathed and drained 750mL.

## 2024-10-27 NOTE — PROGRESS NOTES
Patient agitated and speaking in [place]i for needs. This RN used the  to ask patient what she needs and patient goes silent. She was asked multiple times by  in [place]i what she was needing, patient still remains silent. When the  logged off, patient became agitated again in [place]i.

## 2024-10-28 PROBLEM — G93.41 ACUTE METABOLIC ENCEPHALOPATHY: Status: ACTIVE | Noted: 2024-10-28

## 2024-10-28 LAB
ANION GAP SERPL CALC-SCNC: 18 MEQ/L (ref 8–16)
BUN SERPL-MCNC: 39 MG/DL (ref 7–22)
CALCIUM SERPL-MCNC: 8.4 MG/DL (ref 8.5–10.5)
CHLORIDE SERPL-SCNC: 99 MEQ/L (ref 98–111)
CO2 SERPL-SCNC: 20 MEQ/L (ref 23–33)
CREAT SERPL-MCNC: 3.1 MG/DL (ref 0.4–1.2)
GFR SERPL CREATININE-BSD FRML MDRD: 17 ML/MIN/1.73M2
GLUCOSE BLD STRIP.AUTO-MCNC: 138 MG/DL (ref 70–108)
GLUCOSE BLD STRIP.AUTO-MCNC: 98 MG/DL (ref 70–108)
GLUCOSE SERPL-MCNC: 113 MG/DL (ref 70–108)
MAGNESIUM SERPL-MCNC: 1.8 MG/DL (ref 1.6–2.4)
POTASSIUM SERPL-SCNC: 3.4 MEQ/L (ref 3.5–5.2)
SODIUM SERPL-SCNC: 137 MEQ/L (ref 135–145)

## 2024-10-28 PROCEDURE — 6370000000 HC RX 637 (ALT 250 FOR IP): Performed by: STUDENT IN AN ORGANIZED HEALTH CARE EDUCATION/TRAINING PROGRAM

## 2024-10-28 PROCEDURE — 99233 SBSQ HOSP IP/OBS HIGH 50: CPT | Performed by: STUDENT IN AN ORGANIZED HEALTH CARE EDUCATION/TRAINING PROGRAM

## 2024-10-28 PROCEDURE — 36415 COLL VENOUS BLD VENIPUNCTURE: CPT

## 2024-10-28 PROCEDURE — 83735 ASSAY OF MAGNESIUM: CPT

## 2024-10-28 PROCEDURE — 82948 REAGENT STRIP/BLOOD GLUCOSE: CPT

## 2024-10-28 PROCEDURE — 2060000000 HC ICU INTERMEDIATE R&B

## 2024-10-28 PROCEDURE — 6360000002 HC RX W HCPCS: Performed by: STUDENT IN AN ORGANIZED HEALTH CARE EDUCATION/TRAINING PROGRAM

## 2024-10-28 PROCEDURE — 51701 INSERT BLADDER CATHETER: CPT

## 2024-10-28 PROCEDURE — 6370000000 HC RX 637 (ALT 250 FOR IP): Performed by: INTERNAL MEDICINE

## 2024-10-28 PROCEDURE — 90935 HEMODIALYSIS ONE EVALUATION: CPT

## 2024-10-28 PROCEDURE — 51798 US URINE CAPACITY MEASURE: CPT

## 2024-10-28 PROCEDURE — 80048 BASIC METABOLIC PNL TOTAL CA: CPT

## 2024-10-28 PROCEDURE — 6370000000 HC RX 637 (ALT 250 FOR IP): Performed by: NURSE PRACTITIONER

## 2024-10-28 PROCEDURE — 99232 SBSQ HOSP IP/OBS MODERATE 35: CPT | Performed by: INTERNAL MEDICINE

## 2024-10-28 RX ORDER — ALPRAZOLAM 0.25 MG/1
0.25 TABLET ORAL ONCE
Status: DISCONTINUED | OUTPATIENT
Start: 2024-10-28 | End: 2024-10-28

## 2024-10-28 RX ORDER — HALOPERIDOL 5 MG/ML
5 INJECTION INTRAMUSCULAR ONCE
Status: COMPLETED | OUTPATIENT
Start: 2024-10-28 | End: 2024-10-28

## 2024-10-28 RX ORDER — HYDRALAZINE HYDROCHLORIDE 50 MG/1
50 TABLET, FILM COATED ORAL 3 TIMES DAILY
Status: DISCONTINUED | OUTPATIENT
Start: 2024-10-28 | End: 2024-10-29

## 2024-10-28 RX ORDER — ALPRAZOLAM 0.25 MG/1
0.25 TABLET ORAL NIGHTLY PRN
Status: DISCONTINUED | OUTPATIENT
Start: 2024-10-28 | End: 2024-11-01 | Stop reason: HOSPADM

## 2024-10-28 RX ADMIN — ASPIRIN 81 MG: 81 TABLET, COATED ORAL at 13:10

## 2024-10-28 RX ADMIN — APIXABAN 5 MG: 5 TABLET, FILM COATED ORAL at 20:34

## 2024-10-28 RX ADMIN — CLOPIDOGREL BISULFATE 75 MG: 75 TABLET ORAL at 13:10

## 2024-10-28 RX ADMIN — LEVETIRACETAM 500 MG: 500 SOLUTION ORAL at 20:34

## 2024-10-28 RX ADMIN — HYDRALAZINE HYDROCHLORIDE 50 MG: 50 TABLET ORAL at 20:45

## 2024-10-28 RX ADMIN — CHLORHEXIDINE GLUCONATE, 0.12% ORAL RINSE 15 ML: 1.2 SOLUTION DENTAL at 20:34

## 2024-10-28 RX ADMIN — CHLORHEXIDINE GLUCONATE, 0.12% ORAL RINSE 15 ML: 1.2 SOLUTION DENTAL at 14:48

## 2024-10-28 RX ADMIN — LEVETIRACETAM 500 MG: 500 SOLUTION ORAL at 13:11

## 2024-10-28 RX ADMIN — HALOPERIDOL LACTATE 5 MG: 5 INJECTION, SOLUTION INTRAMUSCULAR at 20:40

## 2024-10-28 RX ADMIN — APIXABAN 5 MG: 5 TABLET, FILM COATED ORAL at 13:10

## 2024-10-28 RX ADMIN — ALPRAZOLAM 0.25 MG: 0.25 TABLET ORAL at 23:13

## 2024-10-28 RX ADMIN — HYDRALAZINE HYDROCHLORIDE 25 MG: 25 TABLET ORAL at 13:10

## 2024-10-28 RX ADMIN — FOLIC ACID 1 MG: 1 TABLET ORAL at 13:10

## 2024-10-28 NOTE — PLAN OF CARE
Problem: Chronic Conditions and Co-morbidities  Goal: Patient's chronic conditions and co-morbidity symptoms are monitored and maintained or improved  Outcome: Progressing     Problem: Discharge Planning  Goal: Discharge to home or other facility with appropriate resources  Outcome: Progressing     Problem: Pain  Goal: Verbalizes/displays adequate comfort level or baseline comfort level  Outcome: Progressing     Problem: Safety - Adult  Goal: Free from fall injury  Outcome: Progressing     Problem: ABCDS Injury Assessment  Goal: Absence of physical injury  Outcome: Progressing     Problem: Nutrition Deficit:  Goal: Optimize nutritional status  Outcome: Progressing     Problem: Neurosensory - Adult  Goal: Achieves stable or improved neurological status  Outcome: Progressing  Goal: Absence of seizures  Outcome: Progressing  Goal: Remains free of injury related to seizures activity  Outcome: Progressing     Problem: Respiratory - Adult  Goal: Achieves optimal ventilation and oxygenation  Outcome: Progressing     Problem: Cardiovascular - Adult  Goal: Maintains optimal cardiac output and hemodynamic stability  Outcome: Progressing     Problem: Skin/Tissue Integrity - Adult  Goal: Skin integrity remains intact  Outcome: Progressing  Goal: Oral mucous membranes remain intact  Outcome: Progressing     Problem: Musculoskeletal - Adult  Goal: Return mobility to safest level of function  Outcome: Progressing     Problem: Gastrointestinal - Adult  Goal: Maintains or returns to baseline bowel function  Outcome: Progressing  Goal: Maintains adequate nutritional intake  Outcome: Progressing     Problem: Metabolic/Fluid and Electrolytes - Adult  Goal: Electrolytes maintained within normal limits  Outcome: Progressing  Goal: Hemodynamic stability and optimal renal function maintained  Outcome: Progressing  Goal: Glucose maintained within prescribed range  Outcome: Progressing

## 2024-10-28 NOTE — PROGRESS NOTES
[facility]  SPEECH THERAPY MISSED TREATMENT NOTE  STRZ ICU STEPDOWN TELEMETRY 4K      Date: 10/28/2024  Patient Name: Shahrzad Gregory        MRN: [id number]    : 1970  (54 y.o.)    REASON FOR MISSED TREATMENT:  RN approved ST session. Patient with heightened agitation and walking hallways with live sitter. Patient unwilling to return to room for completion of CSE. Patient briefly transitioned to wheelchair. ST attempted to complete CSE in hallway by offering thin liquids via cup + puree solids; however, patient refusing. Patient rising up out of wheelchair and walking over to stairwell door. Patient placing hand on door with gentle push. ST and live sitter redirected patient to wheelchair. Agreeable to sit; however, continuing to refuse PO intake despite ST cues \"comer\" (to eat, Argentine). ST attempt discontinued. ST to f/u on subsequent date as medically appropriate.       Jennifer Parry MA, CCC-SLP [id number]

## 2024-10-28 NOTE — PROGRESS NOTES
[facility]   Nephrology progress note  10/28/2024, 11:56 AM      Pt Name:    Shahrzad Gregory  MRN:     [id number]     YOB: 1970  Admit Date:    10/23/2024 12:05 PM    Chief Complaint: Nephrology following for ESRD    Subjective:  Patient was seen and examined this morning  Seen earlier this morning  Used language line   Tay: ID number [id number]  Patient would not answer all questions     Objective:  24HR INTAKE/OUTPUT:    Intake/Output Summary (Last 24 hours) at 10/28/2024 1156  Last data filed at 10/28/2024 0400  Gross per 24 hour   Intake 648.75 ml   Output 800 ml   Net -151.25 ml         I/O last 3 completed shifts:  In: 1704.7 [I.V.:1704.7]  Out: 2150 [Urine:2150]  No intake/output data recorded.   Admission weight: 85.8 kg (189 lb 2.5 oz)  Wt Readings from Last 3 Encounters:   10/27/24 91.5 kg (201 lb 11.5 oz)   10/21/24 86.7 kg (191 lb 2.2 oz)   10/18/24 85 kg (187 lb 6.3 oz)        Vitals :   Vitals:    10/27/24 2056 10/28/24 0013 10/28/24 0331 10/28/24 0820   BP: (!) 168/60 (!) 143/60 (!) 158/65 (!) 176/90   Pulse:   70 74   Resp:  18 18 20   Temp:  98.6 °F (37 °C) 97.9 °F (36.6 °C) 97.5 °F (36.4 °C)   TempSrc:  Oral Oral    SpO2:  96% 95% 100%   Weight:       Height:           Physical examination  General Appearance: Agitated  Neck: No JVD visibly noted  Lungs: Diminished, poor effort, no use of accessory muscles  GI: soft  Extremities: ++ LE edema    Medications:  Infusion:    [Held by provider] dextrose 25 mL/hr at 10/27/24 1243    dextrose Stopped (10/25/24 1019)    sodium chloride       Meds:    ALPRAZolam  0.25 mg Oral Once    levETIRAcetam  500 mg Oral BID    [Held by provider] insulin glargine  8 Units SubCUTAneous Daily    [Held by provider] insulin lispro  0-8 Units SubCUTAneous 4x Daily AC & HS    [Held by provider] bumetanide  1 mg IntraVENous Daily    epoetin george-epbx  6,000 Units SubCUTAneous Once per day on Tuesday Thursday Saturday     chlorhexidine  15 mL Mouth/Throat BID    sodium chloride flush  5-40 mL IntraVENous 2 times per day    apixaban  5 mg Oral BID    aspirin  81 mg Oral Daily    atorvastatin  40 mg Oral QPM    carvedilol  25 mg Oral BID WC    clopidogrel  75 mg Oral Daily    folic acid  1 mg Oral Daily    hydrALAZINE  25 mg Oral TID    [Held by provider] losartan  100 mg Oral Daily    [Held by provider] metOLazone  2.5 mg Oral Daily    sertraline  50 mg Oral QPM    [Held by provider] sevelamer  800 mg Oral TID      Meds prn: glucose, dextrose bolus **OR** dextrose bolus, dextrose, sodium chloride flush, sodium chloride, polyethylene glycol, acetaminophen **OR** acetaminophen, promethazine **OR** ondansetron, glucagon (rDNA)     Lab Data :  CBC:   Recent Labs     10/26/24  0317 10/27/24  0540   WBC 12.3* 7.0   HGB 8.2* 7.7*   HCT 27.1* 24.5*    249     CMP:  Recent Labs     10/26/24  0317 10/27/24  0540 10/28/24  0830   * 135  --    K 3.1* 3.2*  --    CL 94* 96*  --    CO2 25 25  --    BUN 31* 37*  --    CREATININE 2.4* 3.1*  --    GLUCOSE 124* 108  --    CALCIUM 7.4* 8.1*  --    MG 1.7  --  1.8   PHOS 3.7  --   --      Hepatic:   Recent Labs     10/26/24  0317 10/27/24  0540   AST 18 17   ALT 13 14   BILITOT 0.2* 0.3   ALKPHOS 135* 138*         Assessment and Plan:  ESRD on HD  Continue dialysis Mondays, Wednesdays and Fridays.  Plan dialysis today.  Check BMP  Discussed with patient and she is in agreement for dialysis today.    Anemia in ESRD.  Continue Retacrit  Seizure  Hypokalemia.  Use 4K bath.  Check BMP  Hypoglycemia.  On low-dose D5W  Hypertension  History of CVA  Diabetes mellitus  Paroxysmal atrial fibrillation  History of noncompliance  Pleural effusion    Masoud Osborn MD  [facility]    This report has been created using voice recognition software. It may contain minor errors which are inherent in voice recognition technology

## 2024-10-28 NOTE — PLAN OF CARE
Problem: Chronic Conditions and Co-morbidities  Goal: Patient's chronic conditions and co-morbidity symptoms are monitored and maintained or improved  10/28/2024 0508 by Austin Martin RN  Outcome: Progressing  10/28/2024 0436 by Austin Martin RN  Outcome: Progressing     Problem: Discharge Planning  Goal: Discharge to home or other facility with appropriate resources  10/28/2024 0508 by Austin Martin RN  Outcome: Progressing  10/28/2024 0436 by Austin Martin RN  Outcome: Progressing     Problem: Pain  Goal: Verbalizes/displays adequate comfort level or baseline comfort level  10/28/2024 0508 by Austin Martin RN  Outcome: Progressing  10/28/2024 0436 by Austin Martin RN  Outcome: Progressing     Problem: Safety - Adult  Goal: Free from fall injury  10/28/2024 0508 by Austin Martin RN  Outcome: Progressing  10/28/2024 0436 by Austin Martin RN  Outcome: Progressing     Problem: ABCDS Injury Assessment  Goal: Absence of physical injury  10/28/2024 0508 by Austin Martin RN  Outcome: Progressing  10/28/2024 0436 by Austin Martin RN  Outcome: Progressing     Problem: Nutrition Deficit:  Goal: Optimize nutritional status  10/28/2024 0508 by Austin Martin RN  Outcome: Progressing  10/28/2024 0436 by Austin Martin RN  Outcome: Progressing     Problem: Neurosensory - Adult  Goal: Achieves stable or improved neurological status  10/28/2024 0508 by Austin Martin RN  Outcome: Progressing  10/28/2024 0436 by Austin Martin RN  Outcome: Progressing  Goal: Absence of seizures  10/28/2024 0508 by Austin Martin RN  Outcome: Progressing  10/28/2024 0436 by Austin Martin RN  Outcome: Progressing  Goal: Remains free of injury related to seizures activity  10/28/2024 0508 by Austin Martin RN  Outcome: Progressing  10/28/2024 0436 by Austin Martin RN  Outcome: Progressing     Problem: Respiratory - Adult  Goal: Achieves optimal ventilation and oxygenation  10/28/2024 0508 by Mario  Progressing  Goal: Glucose maintained within prescribed range  10/28/2024 0508 by Austin Martin, RN  Outcome: Progressing  10/28/2024 0436 by Austin Martin, RN  Outcome: Progressing

## 2024-10-28 NOTE — PROGRESS NOTES
[facility]  OCCUPATIONAL THERAPY MISSED TREATMENT NOTE  STRZ ICU STEPDOWN TELEMETRY 4K  4K-25/025-A      Date: 10/28/2024  Patient Name: Shahrzad Gregory        CSN: [id number]   : 1970  (54 y.o.)  Gender: female                REASON FOR MISSED TREATMENT: Patient Refused.  This therapist met patient and nurse in hallway. Patient in wheelchair with nurse wheeling her around. Patient made eye contact with this therapist once when asked, \"apple estas\", patient responded \"van\" and continued to keep eyes straight. Patient did not attempt to continue in conversation and continued to look straight ahead. RN made aware of strict bedrest orders in place, will attempt next availability.     Charlotte Robles, OTR/L [id number]

## 2024-10-28 NOTE — PROGRESS NOTES
Comprehensive Nutrition Assessment    Type and Reason for Visit:  Reassess    Nutrition Recommendations/Plan:   Monitor NPO, SLP swallow eval results, labs& wt.  Note, when pt last seen by our service on (10/25) pt was on Nepro Carb Steady 20ml/hr  with bolus of 1 2.5 ounce liquid protein (proteinex) daily.       Malnutrition Assessment:  Malnutrition Status:  At risk for malnutrition (Comment) (10/28/24 0950)    Context:  Acute Illness     Findings of the 6 clinical characteristics of malnutrition:  Energy Intake:   (was on tube feeding 10/23-10/25, now NPO pending SLP swallow eval)  Weight Loss:  Unable to assess (pt with ESRD on hemodialyisis so difficult to asses dry weight)     Body Fat Loss:  Unable to assess (no significant  body fat loss when RD saw (10/25/24))     Muscle Mass Loss:  Unable to assess (no significant muscle mass loss when RD saw (10/25/24))    Fluid Accumulation:  Unable to assess     Strength:  Not Performed    Nutrition Assessment:     Pt. nutritionally not improving AEB NPO .  At risk for further nutrition compromise r/t admit d/t altered mental status s/p emergent intubation on 10/23 for seizure during dialysis,extubated (10/25), ESRD on Hemodialysis (Nephrology following), metabolic acidosis,acute toxic metabolic encephalopathy, hypoglycemia 10/25,  and underlying medical condition (Hx: CAD s/p CABG, Atrial Fibrillation, DM- A1c 6.5% 9/2024, Multiple CVA's, Anemia, HTN, CHF)     Nutrition Related Findings:    Pt. Report/Treatments/Miscellaneous: Pt off unit at HD & no longer on tube feeds & currently NPO . Pt extubated (10/25). On (10/25) Nepro 20ml/hr with 1 proteinex 2.5 ounces daily with free water flushes of 200ml free water every 6 hrs per Dr Helms (10/24). Pt was on diprivan 15.6ml/hr (412 lipid kcals) (10/25/24)  while on vent.  Await SLP swallow eval. I mentioned to RN tube feeding orders are still in computer however RN mentions pt is not still on tube feeds.    GI Status: BM

## 2024-10-28 NOTE — PROGRESS NOTES
[facility]  PHYSICAL THERAPY MISSED TREATMENT NOTE  STRZ ICU STEPDOWN TELEMETRY 4K    Date: 10/28/2024  Patient Name: Shahrzad Gregory        MRN: [id number]   : 1970  (54 y.o.)  Gender: female                REASON FOR MISSED TREATMENT:  Hold treatment per nursing request.      Per RN pt was just off the floor and while she was there, she was punching/biting. Pt also with strict bedrest orders in place and currently with hypertension. Pt not appropriate for PT eval at this time. Will try back as able.

## 2024-10-28 NOTE — CARE COORDINATION
10/28/24, 3:22 PM EDT    DISCHARGE ON GOING EVALUATION    [facility] day: 5  Location: [facility]25/025-A Reason for admit: Respiratory arrest (HCC) [R09.2]  ESRF (end stage renal failure) (HCC) [N18.6]     Procedures: No    Imaging since last note: No    Barriers to Discharge: Hypertensive today. Agitated and reportedly punching/biting at one point today. Nephro cont to follow. HD today.     PCP: Trang Maciel DO  Readmission Risk Score: 36.2    Patient Goals/Plan/Treatment Preferences: From home with family.

## 2024-10-28 NOTE — PROGRESS NOTES
Hospitalist Progress Note      Patient:  Shahrzad Gregory    Unit/Bed:4K-25/025-A  YOB: 1970  MRN: [id number]   Acct: [id number]   PCP: Trang Maciel DO  Date of Admission: 10/23/2024    Assessment/Plan:    Acute metabolic encephalopathy/ Intermittent behavioral disturbance   Unclear etiology, possibly related to encephalomalacia. Patient does not appear disoriented but has episodes of worsened agitation, becomes aggressive and non cooperative. S/p HD so no major electrolyte or metabolic disturbance. No suspicion for infection due to lack of signs and symptoms.   -Family contacted and requested if they can be bedside which might help her.   -Psych consulted. Possible MDD with language barrier and reported tearful episodes/ encounters per chart review.  -Delirium precautions.   -SLP for cognitive eval if able to perform.    Newly diagnosed seizure / Encephalomalacia   Patient was reportedly noted to have seizure episode while in ED with noted confusion. S/p intubation for airway protection 10/23/24. S/p extubation 10/25. EEG + for 2 episodes of seizure on 10/24/24 as well as notable for severe encephalopathy 10/25/24 AM. MRI brain 10/25/24 showed \"areas of encephalomalacia in the left/mildly in the right occipital lobes and the left posterior temporal lobe with susceptibility artifact noted increased signal intensity in the white matter and maryan likely secondary to ischemic changes with no evidence of acute infarct, small infarct in the right and left cerebral hemispheres.\"   -continue Keppra 500 mg BID per Neurology. Will need outpatient follow up. Neurology signed off.    Primary HTN  Uncontrolled. On Coreg, Bumex, and Hydralazine.   -continue home except bumex for now. Adjust meds as needed with BP goal <130/80.   -monitor BP closely as able.     ESRD on HD  Was being treated in [place] prior to move. Not established here with  []Observation  Telemetry: [x]Yes  []No  PT/OT: [x]Yes  []No  SLP: [x]Yes  []No      Disposition:      [x] TBD                             [] Home                             [] TCU                             [] Rehab                             [] Psych                             [] SNF                             [] Long Term Care Facility                             [] Other-      Time Spent is more than 45 minutes in the examination, evaluation, counseling and review of medications and plan.      Chief Complaint: AMS/ seizure    Hospital Course:   55 yo F with hx ESRD on HD, HFrEF, Afib, CVA in the setting of medical noncompliance presented to [facility] ED with needs for dialysis. Reportedly had a seizure episode in the ED after 2 hours of HD. Admitted for further management.       Interval:   Continue to have intermittent episodes of increased agitation and noncomplaince, somewhat aggressive behavior. Per HD staff, she was combative and aggressive during HD. Patient's daughter was reportedly called to talk to her.   Was seen multiple times today. Initially seen post HD, patient was calm.  Later in the day, became more agitated, wandering the hallway. Non cooperative with the staff. Pulled out her PIV and telemetry.   After multiple failed calls to daughter, son was called who stated that he will ask his sister to come in if possible to possibly help the patient.  Patient refusing to have BP checked this afternoon.    Subjective:   During first evaluation, she reported having no issues including dyspnea, chest pain, nausea, abdominal pain. When asked about her move from [place], she stated \"has been here\". Unable to obtain much history from the patient while using interpretation services.   Admitted to having high BP chronically. Does not check regularly.       ROS (12 point review of systems completed.  Pertinent positives noted. Otherwise ROS is negative)     Medications:  Reviewed    Infusion Medications

## 2024-10-29 LAB
ANION GAP SERPL CALC-SCNC: 15 MEQ/L (ref 8–16)
BUN SERPL-MCNC: 19 MG/DL (ref 7–22)
CALCIUM SERPL-MCNC: 8.2 MG/DL (ref 8.5–10.5)
CHLORIDE SERPL-SCNC: 95 MEQ/L (ref 98–111)
CO2 SERPL-SCNC: 25 MEQ/L (ref 23–33)
CREAT SERPL-MCNC: 2.5 MG/DL (ref 0.4–1.2)
DEPRECATED RDW RBC AUTO: 48 FL (ref 35–45)
ERYTHROCYTE [DISTWIDTH] IN BLOOD BY AUTOMATED COUNT: 15 % (ref 11.5–14.5)
GFR SERPL CREATININE-BSD FRML MDRD: 22 ML/MIN/1.73M2
GLUCOSE BLD STRIP.AUTO-MCNC: 102 MG/DL (ref 70–108)
GLUCOSE BLD STRIP.AUTO-MCNC: 115 MG/DL (ref 70–108)
GLUCOSE BLD STRIP.AUTO-MCNC: 132 MG/DL (ref 70–108)
GLUCOSE BLD STRIP.AUTO-MCNC: 249 MG/DL (ref 70–108)
GLUCOSE BLD STRIP.AUTO-MCNC: 70 MG/DL (ref 70–108)
GLUCOSE BLD STRIP.AUTO-MCNC: 75 MG/DL (ref 70–108)
GLUCOSE SERPL-MCNC: 80 MG/DL (ref 70–108)
HCT VFR BLD AUTO: 32.7 % (ref 37–47)
HGB BLD-MCNC: 10.2 GM/DL (ref 12–16)
MCH RBC QN AUTO: 27 PG (ref 26–33)
MCHC RBC AUTO-ENTMCNC: 31.2 GM/DL (ref 32.2–35.5)
MCV RBC AUTO: 86.5 FL (ref 81–99)
PLATELET # BLD AUTO: 382 THOU/MM3 (ref 130–400)
PMV BLD AUTO: 10.3 FL (ref 9.4–12.4)
POTASSIUM SERPL-SCNC: 3.3 MEQ/L (ref 3.5–5.2)
RBC # BLD AUTO: 3.78 MILL/MM3 (ref 4.2–5.4)
SODIUM SERPL-SCNC: 135 MEQ/L (ref 135–145)
WBC # BLD AUTO: 8.3 THOU/MM3 (ref 4.8–10.8)

## 2024-10-29 PROCEDURE — 2580000003 HC RX 258: Performed by: STUDENT IN AN ORGANIZED HEALTH CARE EDUCATION/TRAINING PROGRAM

## 2024-10-29 PROCEDURE — 6370000000 HC RX 637 (ALT 250 FOR IP): Performed by: NURSE PRACTITIONER

## 2024-10-29 PROCEDURE — 6360000002 HC RX W HCPCS: Performed by: PHYSICIAN ASSISTANT

## 2024-10-29 PROCEDURE — 51798 US URINE CAPACITY MEASURE: CPT

## 2024-10-29 PROCEDURE — 6370000000 HC RX 637 (ALT 250 FOR IP)

## 2024-10-29 PROCEDURE — 85027 COMPLETE CBC AUTOMATED: CPT

## 2024-10-29 PROCEDURE — 82948 REAGENT STRIP/BLOOD GLUCOSE: CPT

## 2024-10-29 PROCEDURE — 80048 BASIC METABOLIC PNL TOTAL CA: CPT

## 2024-10-29 PROCEDURE — 99233 SBSQ HOSP IP/OBS HIGH 50: CPT | Performed by: STUDENT IN AN ORGANIZED HEALTH CARE EDUCATION/TRAINING PROGRAM

## 2024-10-29 PROCEDURE — 6370000000 HC RX 637 (ALT 250 FOR IP): Performed by: STUDENT IN AN ORGANIZED HEALTH CARE EDUCATION/TRAINING PROGRAM

## 2024-10-29 PROCEDURE — 92610 EVALUATE SWALLOWING FUNCTION: CPT

## 2024-10-29 PROCEDURE — 51701 INSERT BLADDER CATHETER: CPT

## 2024-10-29 PROCEDURE — 2060000000 HC ICU INTERMEDIATE R&B

## 2024-10-29 PROCEDURE — 99232 SBSQ HOSP IP/OBS MODERATE 35: CPT | Performed by: INTERNAL MEDICINE

## 2024-10-29 PROCEDURE — 6370000000 HC RX 637 (ALT 250 FOR IP): Performed by: INTERNAL MEDICINE

## 2024-10-29 PROCEDURE — 2580000003 HC RX 258

## 2024-10-29 PROCEDURE — 36415 COLL VENOUS BLD VENIPUNCTURE: CPT

## 2024-10-29 RX ORDER — NIFEDIPINE 30 MG/1
30 TABLET, EXTENDED RELEASE ORAL NIGHTLY
Status: DISCONTINUED | OUTPATIENT
Start: 2024-10-29 | End: 2024-10-29

## 2024-10-29 RX ORDER — HYDRALAZINE HYDROCHLORIDE 25 MG/1
25 TABLET, FILM COATED ORAL 3 TIMES DAILY
Status: DISCONTINUED | OUTPATIENT
Start: 2024-10-29 | End: 2024-11-01 | Stop reason: HOSPADM

## 2024-10-29 RX ORDER — POTASSIUM CHLORIDE 1500 MG/1
40 TABLET, EXTENDED RELEASE ORAL ONCE
Status: COMPLETED | OUTPATIENT
Start: 2024-10-29 | End: 2024-10-29

## 2024-10-29 RX ORDER — 0.9 % SODIUM CHLORIDE 0.9 %
250 INTRAVENOUS SOLUTION INTRAVENOUS ONCE
Status: COMPLETED | OUTPATIENT
Start: 2024-10-29 | End: 2024-10-29

## 2024-10-29 RX ORDER — HYDRALAZINE HYDROCHLORIDE 20 MG/ML
10 INJECTION INTRAMUSCULAR; INTRAVENOUS ONCE
Status: COMPLETED | OUTPATIENT
Start: 2024-10-29 | End: 2024-10-29

## 2024-10-29 RX ORDER — CARVEDILOL 6.25 MG/1
12.5 TABLET ORAL 2 TIMES DAILY WITH MEALS
Status: DISCONTINUED | OUTPATIENT
Start: 2024-10-29 | End: 2024-11-01 | Stop reason: HOSPADM

## 2024-10-29 RX ADMIN — SERTRALINE 50 MG: 50 TABLET, FILM COATED ORAL at 17:33

## 2024-10-29 RX ADMIN — CHLORHEXIDINE GLUCONATE, 0.12% ORAL RINSE 15 ML: 1.2 SOLUTION DENTAL at 20:07

## 2024-10-29 RX ADMIN — POTASSIUM CHLORIDE 40 MEQ: 1500 TABLET, EXTENDED RELEASE ORAL at 10:52

## 2024-10-29 RX ADMIN — ATORVASTATIN CALCIUM 40 MG: 40 TABLET, FILM COATED ORAL at 17:33

## 2024-10-29 RX ADMIN — CLOPIDOGREL BISULFATE 75 MG: 75 TABLET ORAL at 08:15

## 2024-10-29 RX ADMIN — SODIUM CHLORIDE, PRESERVATIVE FREE 10 ML: 5 INJECTION INTRAVENOUS at 20:08

## 2024-10-29 RX ADMIN — LEVETIRACETAM 500 MG: 500 SOLUTION ORAL at 20:10

## 2024-10-29 RX ADMIN — APIXABAN 5 MG: 5 TABLET, FILM COATED ORAL at 20:07

## 2024-10-29 RX ADMIN — SODIUM CHLORIDE, PRESERVATIVE FREE 10 ML: 5 INJECTION INTRAVENOUS at 00:42

## 2024-10-29 RX ADMIN — HYDRALAZINE HYDROCHLORIDE 10 MG: 20 INJECTION, SOLUTION INTRAMUSCULAR; INTRAVENOUS at 05:02

## 2024-10-29 RX ADMIN — FOLIC ACID 1 MG: 1 TABLET ORAL at 08:15

## 2024-10-29 RX ADMIN — ASPIRIN 81 MG: 81 TABLET, COATED ORAL at 08:15

## 2024-10-29 RX ADMIN — CARVEDILOL 12.5 MG: 6.25 TABLET, FILM COATED ORAL at 17:33

## 2024-10-29 RX ADMIN — APIXABAN 5 MG: 5 TABLET, FILM COATED ORAL at 08:15

## 2024-10-29 RX ADMIN — CARVEDILOL 25 MG: 25 TABLET, FILM COATED ORAL at 08:15

## 2024-10-29 RX ADMIN — SODIUM CHLORIDE 250 ML: 9 INJECTION, SOLUTION INTRAVENOUS at 10:46

## 2024-10-29 RX ADMIN — LEVETIRACETAM 500 MG: 500 SOLUTION ORAL at 08:20

## 2024-10-29 RX ADMIN — HYDRALAZINE HYDROCHLORIDE 50 MG: 50 TABLET ORAL at 08:15

## 2024-10-29 ASSESSMENT — PAIN SCALES - GENERAL
PAINLEVEL_OUTOF10: 0
PAINLEVEL_OUTOF10: 0

## 2024-10-29 NOTE — PROGRESS NOTES
0700 Report taken from nurse Vicente.    Head to Toe Assessment    [facility] Student Nurse  Head to Toe Assessment Performed at 0815  Skin  General skin appearance / Description: warm, dry, intact  Incisions / Wounds: scattered ecchymosis     Neuro/Head  LOC: A+O x 2  Speech: mumbling when talking   Eyes PERRLA 2 mm  Symmetry of face / tongue: WNL  JVD of neck: not present    Chest  Heart sounds / Apical Pulse: regular rhythm   Dyspnea: WNL  Lung sounds: WNL  Cough: productive cough present     Abdomen/ Pelvis  Bowel sounds: hypoactive in all 4 quads  Passing flatus: TERRY  Palpation / Inspection: non-tender  Last BM: 10/29/24 AM  Stool assessment: small, brown, soft   Any GI issues: n/a  Urinary issues: n/a  Continence: incontinent   Urine color / turbidity: clear yellow     Extremities  Edema: +1 non-pitting lower extremities  Altered Sensation: n/a  Upper extremity push / pulls: strong, bilaterally   Left Arm Radial Pulse: +2 amp   Left Upper extremity Capillary Refill: >3 sec  Right Arm Radial Pulse: +1 amp.   Right Upper extremity Capillary Refill: >3 sec  Lower extremity pedal push / pulls: strong, strong bilaterally   Left pedal pulse: +1 amp.    Left posterior tibialis pulse: +1 amp.   Left lower extremity capillary refill: >3 sec  Right pedal pulse: +1 amp.   Right posterior tibialis pulse: +1 amp.   Right lower extremity capillary refill: >3 sec  Drift of extremities: negative  Pain: denies pain     Other issues: no changes    1045 Reassessment done  1400 Reported off to primary Marley JANE    1400 Signature Parris Escamilla  RSGIOVANNI / SN

## 2024-10-29 NOTE — PROGRESS NOTES
Hospitalist Progress Note      Patient:  Shahrzad Gregory    Unit/Bed:4-25/025-A  YOB: 1970  MRN: [id number]   Acct: [id number]   PCP: Trang Maciel DO  Date of Admission: 10/23/2024    Assessment/Plan:    Acute metabolic encephalopathy/ Intermittent behavioral disturbance   Unclear etiology, possibly related to encephalomalacia. Patient does not appear disoriented but has episodes of worsened agitation, becomes aggressive and non cooperative. S/p HD so no major electrolyte or metabolic disturbance. No suspicion for infection due to lack of signs and symptoms.   -Family contacted and requested if they can be bedside which might help her.   -Psych consulted. Possible MDD with language barrier and reported tearful episodes/ encounters per chart review.  -Delirium precautions.   -SLP for cognitive eval if able to perform.    Newly diagnosed seizure / Encephalomalacia   Patient was reportedly noted to have seizure episode while in ED with noted confusion. S/p intubation for airway protection 10/23/24. S/p extubation 10/25. EEG + for 2 episodes of seizure on 10/24/24 as well as notable for severe encephalopathy 10/25/24 AM. MRI brain 10/25/24 showed \"areas of encephalomalacia in the left/mildly in the right occipital lobes and the left posterior temporal lobe with susceptibility artifact noted increased signal intensity in the white matter and maryan likely secondary to ischemic changes with no evidence of acute infarct, small infarct in the right and left cerebral hemispheres.\"   -continue Keppra 500 mg BID per Neurology. Will need outpatient follow up. Neurology signed off.    Primary HTN/ Hypotension  Uncontrolled/ fluctuating. On Coreg, Bumex, and Hydralazine.   -continue home except bumex for now. Adjust meds as needed with BP goal <130/80.   -monitor BP closely    ESRD on HD  Was being treated in [place] prior to move. Not established  []Observation  Telemetry: [x]Yes  []No  PT/OT: [x]Yes  []No  SLP: [x]Yes  []No      Disposition:      [x] TBD                             [] Home                             [] TCU                             [] Rehab                             [] Psych                             [] SNF                             [] Long Term Care Facility                             [] Other-      Time Spent is more than 45 minutes in the examination, evaluation, counseling and review of medications and plan.      Chief Complaint: AMS/ seizure    Hospital Course:   53 yo F with hx ESRD on HD, HFrEF, Afib, CVA in the setting of medical noncompliance presented to [facility] ED with needs for dialysis. Reportedly had a seizure episode in the ED after 2 hours of HD. Admitted for further management.       Interval:   Continued to have intermittent agitation overnight.   Dizzy this morning during eval- noted to be hypotensive. Had just gotten her antihypertensives. Repeat BP improved/ s/p 250 cc bolus IVNS.    Subjective:   Reported feeling dizzy but had no other complaints during evaluation this morning. Repeatedly asked to see her son.       ROS (12 point review of systems completed.  Pertinent positives noted. Otherwise ROS is negative)     Medications:  Reviewed    Infusion Medications    dextrose Stopped (10/25/24 1019)    sodium chloride       Scheduled Medications    [Held by provider] NIFEdipine  30 mg Oral Nightly    carvedilol  12.5 mg Oral BID WC    hydrALAZINE  25 mg Oral TID    levETIRAcetam  500 mg Oral BID    [Held by provider] insulin glargine  8 Units SubCUTAneous Daily    [Held by provider] insulin lispro  0-8 Units SubCUTAneous 4x Daily AC & HS    [Held by provider] bumetanide  1 mg IntraVENous Daily    [Held by provider] epoetin george-epbx  6,000 Units SubCUTAneous Once per day on Tuesday Thursday Saturday    chlorhexidine  15 mL Mouth/Throat BID    sodium chloride flush  5-40 mL IntraVENous 2 times per day    apixaban  5

## 2024-10-29 NOTE — PROGRESS NOTES
Psychiatry Progress Note  10/29/2024      Attempted to see patient. Patient was first getting a bath so came back when finished. After bath patient felt lightheaded and was confused. Will attempt to consult again tomorrow.     Electronically signed by Judson HEALY on 10/29/24 at 10:42 AM EDT

## 2024-10-29 NOTE — PROGRESS NOTES
[facility]  SPEECH THERAPY  STRZ ICU STEPDOWN TELEMETRY 4K  Clinical Swallow Evaluation    Discharge Recommendations: Continue to Assess Pending Progress  DIET ORDER RECOMMENDATIONS AFTER EVALUATION: Soft and bite-size diet, thin liquids - 1:1 assistance/supervision  Strategies:  Full Upright Position, Small Bite/Sip, Pulmonary Monitoring, Medications Whole with Thin, Direct 1:1 Supervision, Alternate Solids and Liquids, Limit Distractions, and Monitor for Fatigue     SLP Individual Minutes  Time In: 0900  Time Out: 0914  Minutes: 14  Timed Code Treatment Minutes: 0 Minutes       Date: 10/29/2024  Patient Name: Shahrzad Gregory      CSN: [id number]   : 1970  (54 y.o.)  Gender: female   Referring Physician:  Sheri Romero MD     Diagnosis: Respiratory arrest (HCC)    History of Present Illness/Injury: Patient admitted with above diagnosis. Per chart review, \"Shahrzad Gregory is a 54 y.o. female with a history of  ESRD on hemodialysis, HFrEF 30%, Afib,  iron deficiency anemia, non-insulin-dependent type 2 diabetes, CVA, hypertension, hyperlipidemia, left ICA stenosis, and noncompliance with medical therapy who presented to [facility] on 10/23/2024 for dialysis.   Per records, Patient has been coming to [facility] ED for dialysis sessions as needed, Patient has some insurance issues and has not been established to any dialysis unit. Patient has been very non-compliant to medical advice and leaves AMA most of the times.  This time patient presented to ED for regular dialysis, while 2 hours into dialysis, she had witness seizure activity with altered mental status, Rapid response was called which was later converted to Code blue (though patient did not loose pulse or did not have arrhthymias but needed higher medical attention). During Seizure activity, Patient bit her tongue and was bleeding, Patient was intubated for respiratory protection. Patient was transferred to  indicated, with stable pulmonary status and incorporation of trained compensatory strategies to assist with nutrition/hydration measures  Goal 2: Considerations for an instrumental evaluation should adverse changes be conveyed in direct relation to the safety/efficency of the swallow mechanism.  Goal 3: Patient will complete [id number] evaluation to further assess cognitive-linguistic skills as clinically indicated in order to update POC.    LONG TERM GOALS:  No long term goals recommended d/t short BACILIO Carmona M.A., CCC-SLP [id number]

## 2024-10-29 NOTE — PROGRESS NOTES
[facility]  OCCUPATIONAL THERAPY MISSED TREATMENT NOTE  STRZ ICU STEPDOWN TELEMETRY 4K  4K-25/025-A      Date: 10/29/2024  Patient Name: Shahrzad Gregory        CSN: [id number]   : 1970  (54 y.o.)  Gender: female                REASON FOR MISSED TREATMENT: Hold Treatment per Nursing; states patient received Haldol and xanax and is sleeping. States patient has been walking laps around unit in past few days. OT to check back another date.

## 2024-10-29 NOTE — PROGRESS NOTES
Comprehensive Nutrition Assessment    Type and Reason for Visit:  Reassess    Nutrition Recommendations/Plan:   Start diet per orders.  Initiate Nepro BID.  Revisit pt as able.  Consider renal MVI ( Folbee Plus)      Malnutrition Assessment:  Malnutrition Status:  At risk for malnutrition (Comment) (10/29/24 1142)    Context:  Acute Illness     Findings of the 6 clinical characteristics of malnutrition:  Energy Intake:   (was on tube feeding (10/23-10/25), extubated (10/25) , new diet order 10/29)  Weight Loss:  No significant weight loss     Body Fat Loss:  No significant body fat loss     Muscle Mass Loss:  No significant muscle mass loss    Fluid Accumulation:  Unable to assess     Strength:  Not Performed    Nutrition Assessment:     Pt. nutritionally not improving AEB pt has been NPO since extubated (10/25) & pt refused SLP swallow eval (10/28) .  At risk for further nutrition compromise r/t admit d/t altered mental status s/p emergent intubation on 10/23 for seizure during dialysis,extubated (10/25), ESRD on Hemodialysis (Nephrology following), metabolic acidosis,acute toxic metabolic encephalopathy, hypoglycemia 10/25,  and underlying medical condition (Hx: CAD s/p CABG, Atrial Fibrillation, DM- A1c 6.5% 9/2024, Multiple CVA's, Anemia, HTN, CHF)     Nutrition Related Findings:    Pt. Report/Treatments/Miscellaneous: Pt seen, her eyes were open & then when I got the  available , pt closed her eyes & did not answer questions.  Earlier she was in the bathroom. New diet order, pt was NPO at breakfast. SLP noted pt refused SLP swallow eval yesterday. No SLP note yet today however new diet order  for Dysphagia Soft & Bite Sized 1:1 Assistance supervision noted in orders.  Pt had been on tube feeds until she was extubated on (10/25).   GI Status: BM x 2 (10/28)  Pertinent Labs: (10/29) Creatinine 2.5, Ca 8.2, Hemoglobin 10.2  Pertinent Meds: Folvite     Wound Type: None (redness on buttocks)

## 2024-10-29 NOTE — CARE COORDINATION
10/29/24, 2:57 PM EDT    DISCHARGE ON GOING EVALUATION    [facility]  [facility]025-A Reason for admit: Respiratory arrest (HCC) [R09.2]  ESRF (end stage renal failure) (HCC) [N18.6]     Procedures:   10/24 EEG  10/25 Continuous EEG  Barriers to Discharge:   From HD ICU (ventilator there)  ESRD/Seizure/AMS  History: CVA, OHS, CKD, DM  Refused Psychiatry Consult  PCP: Trang Maciel DO  Readmission Risk Score: 34.5  Patient Goals/Plan/Treatment Preferences: sitter in room, insurance not accepted locally but once son Todd @ [phone] moves to [place] late December 2024, [facility] will accept insurance, he transports, he is working here locally; had [place] HD in past, not [place] Citizen, speaks [place]ese; AMA in past, refuses therapy at times

## 2024-10-29 NOTE — PROGRESS NOTES
Patient becoming combative, restless, this shift.  Patient refusing  at this time.  Daughter came to bedside after call from attending provider.  Daughter told this nurse that she would prefer us to give her medications instead of calling her to calm patient.  Stated \"I have a baby at home and have things to do [facility] clean [facility]". I informed her that medications would be the last resort when trying to calm the patient down.

## 2024-10-29 NOTE — PROGRESS NOTES
[facility]   Nephrology progress note  10/29/2024, 12:19 PM      Pt Name:    Shahrzad Gregory  MRN:     [id number]     YOB: 1970  Admit Date:    10/23/2024 12:05 PM    Chief Complaint: Nephrology following for ESRD    Subjective:  Patient was seen and examined this morning  Seen earlier this morning  Under one-to-one observation by sitter in the room        Objective:  24HR INTAKE/OUTPUT:    Intake/Output Summary (Last 24 hours) at 10/29/2024 1219  Last data filed at 10/29/2024 0523  Gross per 24 hour   Intake 0 ml   Output 675 ml   Net -675 ml         I/O last 3 completed shifts:  In: 648.8 [I.V.:648.8]  Out: 1075 [Urine:1075]  No intake/output data recorded.   Admission weight: 85.8 kg (189 lb 2.5 oz)  Wt Readings from Last 3 Encounters:   10/29/24 89.9 kg (198 lb 3.1 oz)   10/21/24 86.7 kg (191 lb 2.2 oz)   10/18/24 85 kg (187 lb 6.3 oz)        Vitals :   Vitals:    10/29/24 0800 10/29/24 1022 10/29/24 1030 10/29/24 1049   BP: (!) 168/71 (!) 82/47 (!) 91/51 (!) 124/52   Pulse: 86 57  60   Resp: 18 18     Temp: 97.7 °F (36.5 °C) 97.4 °F (36.3 °C)     TempSrc: Oral Axillary     SpO2: 97% 98%     Weight:       Height:           Physical examination  General Appearance: sleeping  Lungs: no use of accessory muscles  Extremities: ++ LE edema    Medications:  Infusion:    dextrose Stopped (10/25/24 1019)    sodium chloride       Meds:    [Held by provider] NIFEdipine  30 mg Oral Nightly    carvedilol  12.5 mg Oral BID WC    sodium chloride  250 mL IntraVENous Once    hydrALAZINE  50 mg Oral TID    levETIRAcetam  500 mg Oral BID    [Held by provider] insulin glargine  8 Units SubCUTAneous Daily    [Held by provider] insulin lispro  0-8 Units SubCUTAneous 4x Daily AC & HS    [Held by provider] bumetanide  1 mg IntraVENous Daily    [Held by provider] epoetin george-epbx  6,000 Units SubCUTAneous Once per day on Tuesday Thursday Saturday    chlorhexidine  15 mL Mouth/Throat BID

## 2024-10-29 NOTE — PROGRESS NOTES
[facility]  PHYSICAL THERAPY MISSED TREATMENT NOTE  STRZ ICU STEPDOWN TELEMETRY 4K    Date: 10/29/2024  Patient Name: Shahrzad Gregory        MRN: [id number]   : 1970  (54 y.o.)  Gender: female                REASON FOR MISSED TREATMENT:  Pt with strict bedrest orders and sleeping currently. Pt has been easily agitated and not appropriate for PT at this time .

## 2024-10-30 LAB
ANION GAP SERPL CALC-SCNC: 13 MEQ/L (ref 8–16)
BUN SERPL-MCNC: 28 MG/DL (ref 7–22)
CALCIUM SERPL-MCNC: 7.8 MG/DL (ref 8.5–10.5)
CHLORIDE SERPL-SCNC: 98 MEQ/L (ref 98–111)
CO2 SERPL-SCNC: 24 MEQ/L (ref 23–33)
CREAT SERPL-MCNC: 3.5 MG/DL (ref 0.4–1.2)
EKG ATRIAL RATE: 67 BPM
EKG P AXIS: 46 DEGREES
EKG P-R INTERVAL: 158 MS
EKG Q-T INTERVAL: 474 MS
EKG QRS DURATION: 100 MS
EKG QTC CALCULATION (BAZETT): 500 MS
EKG R AXIS: 34 DEGREES
EKG T AXIS: 11 DEGREES
EKG VENTRICULAR RATE: 67 BPM
GFR SERPL CREATININE-BSD FRML MDRD: 15 ML/MIN/1.73M2
GLUCOSE BLD STRIP.AUTO-MCNC: 103 MG/DL (ref 70–108)
GLUCOSE BLD STRIP.AUTO-MCNC: 119 MG/DL (ref 70–108)
GLUCOSE BLD STRIP.AUTO-MCNC: 177 MG/DL (ref 70–108)
GLUCOSE BLD STRIP.AUTO-MCNC: 213 MG/DL (ref 70–108)
GLUCOSE SERPL-MCNC: 112 MG/DL (ref 70–108)
POTASSIUM SERPL-SCNC: 3.9 MEQ/L (ref 3.5–5.2)
SODIUM SERPL-SCNC: 135 MEQ/L (ref 135–145)

## 2024-10-30 PROCEDURE — 2580000003 HC RX 258

## 2024-10-30 PROCEDURE — 6370000000 HC RX 637 (ALT 250 FOR IP): Performed by: STUDENT IN AN ORGANIZED HEALTH CARE EDUCATION/TRAINING PROGRAM

## 2024-10-30 PROCEDURE — 80048 BASIC METABOLIC PNL TOTAL CA: CPT

## 2024-10-30 PROCEDURE — 99233 SBSQ HOSP IP/OBS HIGH 50: CPT | Performed by: STUDENT IN AN ORGANIZED HEALTH CARE EDUCATION/TRAINING PROGRAM

## 2024-10-30 PROCEDURE — 82948 REAGENT STRIP/BLOOD GLUCOSE: CPT

## 2024-10-30 PROCEDURE — 6370000000 HC RX 637 (ALT 250 FOR IP): Performed by: INTERNAL MEDICINE

## 2024-10-30 PROCEDURE — 6370000000 HC RX 637 (ALT 250 FOR IP): Performed by: NURSE PRACTITIONER

## 2024-10-30 PROCEDURE — 1200000000 HC SEMI PRIVATE

## 2024-10-30 PROCEDURE — 93005 ELECTROCARDIOGRAM TRACING: CPT | Performed by: STUDENT IN AN ORGANIZED HEALTH CARE EDUCATION/TRAINING PROGRAM

## 2024-10-30 PROCEDURE — 36415 COLL VENOUS BLD VENIPUNCTURE: CPT

## 2024-10-30 PROCEDURE — 90935 HEMODIALYSIS ONE EVALUATION: CPT

## 2024-10-30 PROCEDURE — 99232 SBSQ HOSP IP/OBS MODERATE 35: CPT | Performed by: INTERNAL MEDICINE

## 2024-10-30 PROCEDURE — 6360000002 HC RX W HCPCS: Performed by: STUDENT IN AN ORGANIZED HEALTH CARE EDUCATION/TRAINING PROGRAM

## 2024-10-30 RX ORDER — INSULIN GLARGINE 100 [IU]/ML
5 INJECTION, SOLUTION SUBCUTANEOUS DAILY
Status: DISCONTINUED | OUTPATIENT
Start: 2024-10-31 | End: 2024-11-01 | Stop reason: HOSPADM

## 2024-10-30 RX ORDER — LORAZEPAM 2 MG/ML
0.5 INJECTION INTRAMUSCULAR ONCE
Status: COMPLETED | OUTPATIENT
Start: 2024-10-30 | End: 2024-10-30

## 2024-10-30 RX ORDER — HALOPERIDOL 5 MG/ML
2 INJECTION INTRAMUSCULAR ONCE
Status: COMPLETED | OUTPATIENT
Start: 2024-10-30 | End: 2024-10-30

## 2024-10-30 RX ADMIN — CLOPIDOGREL BISULFATE 75 MG: 75 TABLET ORAL at 07:48

## 2024-10-30 RX ADMIN — HYDRALAZINE HYDROCHLORIDE 25 MG: 25 TABLET ORAL at 07:49

## 2024-10-30 RX ADMIN — HYDRALAZINE HYDROCHLORIDE 25 MG: 25 TABLET ORAL at 16:30

## 2024-10-30 RX ADMIN — SODIUM CHLORIDE, PRESERVATIVE FREE 10 ML: 5 INJECTION INTRAVENOUS at 19:52

## 2024-10-30 RX ADMIN — HALOPERIDOL LACTATE 2 MG: 5 INJECTION, SOLUTION INTRAMUSCULAR at 17:11

## 2024-10-30 RX ADMIN — SODIUM CHLORIDE, PRESERVATIVE FREE 10 ML: 5 INJECTION INTRAVENOUS at 11:23

## 2024-10-30 RX ADMIN — CARVEDILOL 12.5 MG: 6.25 TABLET, FILM COATED ORAL at 07:49

## 2024-10-30 RX ADMIN — CARVEDILOL 12.5 MG: 6.25 TABLET, FILM COATED ORAL at 16:43

## 2024-10-30 RX ADMIN — SODIUM CHLORIDE, PRESERVATIVE FREE 10 ML: 5 INJECTION INTRAVENOUS at 07:50

## 2024-10-30 RX ADMIN — CHLORHEXIDINE GLUCONATE, 0.12% ORAL RINSE 15 ML: 1.2 SOLUTION DENTAL at 07:48

## 2024-10-30 RX ADMIN — LEVETIRACETAM 500 MG: 500 SOLUTION ORAL at 19:51

## 2024-10-30 RX ADMIN — ASPIRIN 81 MG: 81 TABLET, COATED ORAL at 07:48

## 2024-10-30 RX ADMIN — APIXABAN 5 MG: 5 TABLET, FILM COATED ORAL at 07:49

## 2024-10-30 RX ADMIN — LEVETIRACETAM 500 MG: 500 SOLUTION ORAL at 16:29

## 2024-10-30 RX ADMIN — HYDRALAZINE HYDROCHLORIDE 25 MG: 25 TABLET ORAL at 19:52

## 2024-10-30 RX ADMIN — FOLIC ACID 1 MG: 1 TABLET ORAL at 07:49

## 2024-10-30 RX ADMIN — SERTRALINE 50 MG: 50 TABLET, FILM COATED ORAL at 19:52

## 2024-10-30 RX ADMIN — LORAZEPAM 0.5 MG: 2 INJECTION INTRAMUSCULAR; INTRAVENOUS at 11:23

## 2024-10-30 RX ADMIN — APIXABAN 5 MG: 5 TABLET, FILM COATED ORAL at 19:51

## 2024-10-30 ASSESSMENT — PAIN SCALES - GENERAL
PAINLEVEL_OUTOF10: 0

## 2024-10-30 ASSESSMENT — PAIN SCALES - WONG BAKER: WONGBAKER_NUMERICALRESPONSE: NO HURT

## 2024-10-30 NOTE — PROGRESS NOTES
Patient off floor to 7A Dialysis. Patient continues to refuse to respond to [employer] online .   1300 spoke with daughter, she will be arriving today after child picked up from school.   1310 House supervisor looking at getting live  since patient unwilling to respond to online service that [facility] is providing.

## 2024-10-30 NOTE — PROGRESS NOTES
[facility]  SPEECH THERAPY MISSED TREATMENT NOTE  STRZ ICU STEPDOWN TELEMETRY 4K      Date: 10/30/2024  Patient Name: Shahrzad Gregory        MRN: [id number]    : 1970  (54 y.o.)    REASON FOR MISSED TREATMENT:  Per chart review, patient refusing utilization of language services . Arrangements for live  being made. ST hold at this time and f/u on subsequent date with hopeful live  to attempt completion of cognitive linguistic assessment to increase potential for success.     Jennifer Parry MA, CCC-SLP [id number]

## 2024-10-30 NOTE — PLAN OF CARE
Problem: Chronic Conditions and Co-morbidities  Goal: Patient's chronic conditions and co-morbidity symptoms are monitored and maintained or improved  Outcome: Progressing  Flowsheets (Taken 10/30/2024 1027)  Care Plan - Patient's Chronic Conditions and Co-Morbidity Symptoms are Monitored and Maintained or Improved:   Monitor and assess patient's chronic conditions and comorbid symptoms for stability, deterioration, or improvement   Collaborate with multidisciplinary team to address chronic and comorbid conditions and prevent exacerbation or deterioration   Update acute care plan with appropriate goals if chronic or comorbid symptoms are exacerbated and prevent overall improvement and discharge     Problem: Discharge Planning  Goal: Discharge to home or other facility with appropriate resources  Outcome: Progressing  Flowsheets (Taken 10/30/2024 1027)  Discharge to home or other facility with appropriate resources:   Identify barriers to discharge with patient and caregiver   Identify discharge learning needs (meds, wound care, etc)   Refer to discharge planning if patient needs post-hospital services based on physician order or complex needs related to functional status, cognitive ability or social support system   Arrange for needed discharge resources and transportation as appropriate     Problem: Pain  Goal: Verbalizes/displays adequate comfort level or baseline comfort level  Outcome: Progressing  Flowsheets (Taken 10/30/2024 1027)  Verbalizes/displays adequate comfort level or baseline comfort level:   Encourage patient to monitor pain and request assistance   Administer analgesics based on type and severity of pain and evaluate response   Consider cultural and social influences on pain and pain management   Assess pain using appropriate pain scale   Implement non-pharmacological measures as appropriate and evaluate response     Problem: Safety - Adult  Goal: Free from fall injury  Outcome:  Progressing  Flowsheets (Taken 10/30/2024 1027)  Free From Fall Injury: Instruct family/caregiver on patient safety     Problem: ABCDS Injury Assessment  Goal: Absence of physical injury  Outcome: Progressing  Flowsheets (Taken 10/30/2024 1027)  Absence of Physical Injury: Implement safety measures based on patient assessment     Problem: Nutrition Deficit:  Goal: Optimize nutritional status  Outcome: Progressing  Flowsheets (Taken 10/30/2024 1027)  Nutrient intake appropriate for improving, restoring, or maintaining nutritional needs:   Assess nutritional status and recommend course of action   Monitor oral intake, labs, and treatment plans   Recommend appropriate diets, oral nutritional supplements, and vitamin/mineral supplements     Problem: Neurosensory - Adult  Goal: Achieves stable or improved neurological status  Outcome: Progressing  Flowsheets (Taken 10/30/2024 1027)  Achieves stable or improved neurological status:   Assess for and report changes in neurological status   Monitor temperature, glucose, and sodium. Initiate appropriate interventions as ordered     Problem: Neurosensory - Adult  Goal: Absence of seizures  Outcome: Progressing  Flowsheets (Taken 10/30/2024 1027)  Absence of seizures:   Monitor for seizure activity.  If seizure occurs, document type and location of movements and any associated apnea   If seizure occurs, turn head to side and suction secretions as needed   Administer anticonvulsants as ordered     Problem: Neurosensory - Adult  Goal: Remains free of injury related to seizures activity  Outcome: Progressing  Flowsheets (Taken 10/30/2024 1027)  Remains free of injury related to seizure activity:   Maintain airway, patient safety  and administer oxygen as ordered   If seizure occurs, turn patient to side and suction secretions as needed   Seizure pads on all 4 side rails   Monitor patient for seizure activity, document and report duration and description of seizure to Licensed

## 2024-10-30 NOTE — PALLIATIVE CARE
Initial Evaluation        Patient:   Shahrzad Gregory  YOB: 1970  Age:  54 y.o.  Room:  [facility]/025-  MRN:  [id number]   Acct: [id number]    Date of Admission:  10/23/2024 12:05 PM  Date of Service:  10/30/2024  Completed By:  Glenny Yang RN        Reason for Palliative Care Evaluation:-   Goals of Care     Current Concerns   Currently confused.     Palliative Performance Scale   50%  Mainly sit/lie; Extensive disease; Can't do any work; Considerable assist; intake normal or reduced; LOC full/confusion     History   Signed out AMA 10/22. Has had multiple admissions since September, signs out AMA frequently. Was brought into ED for dialysis. While at dialysis, patient was a Code Blue (no loss of pulse, but needed acute care). Patient with new seizures. Required intubation. Extubated 10/25. Patient has been more confused than baseline. Patient has ESRD and is on dialysis but is not established as an OP. She is originally from [place] but is in [place] with her son who is here for work. PMH includes CAD, HF, HLD, history of CVA. Communication barrier due to patient not consistently wanting to communicate with .      Goals of Care Discussions and Plan         Advance Care Planning   Goals of Care/Advance Care Planning (ACP) Conversation    Date of Conversation: 10/30/24    Individuals present for the conversation: Cornel Blum     ACP documents on file prior to discussion:  -None    Previously completed document/s not on file: Patient / participant reports that there are no previously executed ACP documents.    Healthcare Power of /Healthcare Surrogate Decision Makers:  Per [place] Code 2133.08: [place] Hierarchy of Surrogate Decision Makers   Patient has 7 living children. (per son patient had 8 sons and 3 daughters. Now believes he has 3 brothers and 3 sisters still living)    Conversation Summary: Spoke with primary RN on unit. Patient confused.   Discussed patient  with Sidney JANE Case Manager.     Call placed to Kulwant. Discussed plan of care. Kulwant also unsure of how to help patient. Kulwant states the patient seems to do okay when she leaves AMA, then worsens after getting home. Does not know how to help patient regarding dialysis. Kulwant is patient's sole caregiver. States he does have a sister that is also in [place]. Otherwise all of patient's other children are sill in [place]. Asked Kulwant if he was still comfortable with keeping the patient at home with worsening confusion. Kulwant stated \"well she's my mom, I have to\". Support given to Kulwant. Kulwant stated his sister that is local is starting to help him a little more now that she has realized how sick patient is. Discussed conversations Kulwant had with ICU team. Kulwant denied further questions at this time.     Treatment Limitations: They would want to attempt to sustain life by all medically effective means. This includes providing appropriate medical and surgical treatments as indicated to attempt to prolong life, including intensive care, mechanical ventilation and CPR. This is consistent with a code status of full code.    Resuscitation Status: Full Code No additional code details    Documentation Completed:  -No new documents completed.    Plan/Follow-Up:  Continue current plan of care. Patient will most likely continue to be admitted due to no facility accepting her for dialysis. Family want to continue with current treatment.     I spent 15 minutes with the patient and/or surrogate decision maker discussing the patient's wishes and goals.      Glenny Yang RN              Electronically signed by Glenny Yang RN on 10/30/2024 at 10:03 AM           Palliative Care Office: [phone]

## 2024-10-30 NOTE — PROGRESS NOTES
Psychiatric Progress Note  10/30/2024    Attempted to interview patient today. Got set up with the  and patient refused to answer any questions or have a discussion. If any questions follow up with Dr. Rita Boogie M.D.    Electronically signed by Judson HEALY on 10/30/24 at 10:53 AM EDT

## 2024-10-30 NOTE — PROGRESS NOTES
[facility]   Nephrology progress note  10/30/2024, 9:55 AM      Pt Name:    Shahrzad Gregory  MRN:     [id number]     YOB: 1970  Admit Date:    10/23/2024 12:05 PM    Chief Complaint: Nephrology following for ESRD    Subjective:  Patient was seen and examined this morning  Seen earlier this morning  Under one-to-one observation by sitter in the room  Awaiting hemodialysis treatment  More calm      Objective:  24HR INTAKE/OUTPUT:    Intake/Output Summary (Last 24 hours) at 10/30/2024 0955  Last data filed at 10/30/2024 0735  Gross per 24 hour   Intake 815.68 ml   Output 163 ml   Net 652.68 ml         I/O last 3 completed shifts:  In: 695.7 [P.O.:450; IV Piggyback:245.7]  Out: 838 [Urine:838]  I/O this shift:  In: 120 [P.O.:120]  Out: -    Admission weight: 85.8 kg (189 lb 2.5 oz)  Wt Readings from Last 3 Encounters:   10/30/24 83.5 kg (184 lb 1.4 oz)   10/21/24 86.7 kg (191 lb 2.2 oz)   10/18/24 85 kg (187 lb 6.3 oz)        Vitals :   Vitals:    10/29/24 2358 10/30/24 0349 10/30/24 0547 10/30/24 0745   BP: (!) 132/59 (!) 122/58  (!) 138/100   Pulse: 63 63  71   Resp: 18 18  20   Temp: 98.9 °F (37.2 °C) 99 °F (37.2 °C)  98.8 °F (37.1 °C)   TempSrc: Axillary Oral  Oral   SpO2: 95% 93%  92%   Weight:   83.5 kg (184 lb 1.4 oz)    Height:           Physical examination  General Appearance: Awake, no distress noted  Lungs: no use of accessory muscles  Extremities: + LE edema  Neuro: Awake alert  Psych: Better mood, calm    Medications:  Infusion:    dextrose Stopped (10/25/24 1019)    sodium chloride       Meds:    LORazepam  0.5 mg IntraVENous Once    carvedilol  12.5 mg Oral BID WC    hydrALAZINE  25 mg Oral TID    levETIRAcetam  500 mg Oral BID    [Held by provider] insulin glargine  8 Units SubCUTAneous Daily    [Held by provider] insulin lispro  0-8 Units SubCUTAneous 4x Daily AC & HS    [Held by provider] bumetanide  1 mg IntraVENous Daily    [Held by provider] epoetin

## 2024-10-30 NOTE — PROGRESS NOTES
[facility]  OCCUPATIONAL THERAPY MISSED TREATMENT NOTE  STRZ ICU STEPDOWN TELEMETRY 4K  4K-25/025-A      Date: 10/30/2024  Patient Name: Shahrzad Gregory        CSN: [id number]   : 1970  (54 y.o.)  Gender: female                REASON FOR MISSED TREATMENT: Pt was off floor at hemodialysis.  Pt continues to be on strict bedrest.  Pt has not been receptive to using the video .  Per nursing, arrangements are being made for a live . Will retry tomorrow.

## 2024-10-30 NOTE — PROGRESS NOTES
SQ Heparin                                 [] Encourage ambulation                                 [x] Already on Anticoagulation      LDA: [x]CVC right IJ placed 9/10/24 []PICC  []Midline  []Allison  []Drains  []Mediport  [x]None  Labs: [x]Yes  []No  Level of care: [x]Step Down  []Med-Surg  Bed Status: [x]Inpatient  []Observation  Telemetry: [x]Yes  []No  PT/OT: [x]Yes  []No  SLP: [x]Yes  []No      Disposition:      [x] TBD                             [] Home                             [] TCU                             [] Rehab                             [] Psych                             [] SNF                             [] Long Term Care Facility                             [] Other-      Time Spent is more than 45 minutes in the examination, evaluation, counseling and review of medications and plan.      Chief Complaint: AMS/ seizure    Hospital Course:   55 yo F with hx ESRD on HD, HFrEF, Afib, CVA in the setting of medical noncompliance presented to [facility] ED with needs for dialysis. Reportedly had a seizure episode in the ED after 2 hours of HD. Admitted for further management.       Interval:   Still intermittently agitated and aggressive with staff.   Had to be given Ativan prior to HD today.   Aggressive behavior/ agitation getting worse this afternoon- will give Haldol one time dose again. Family made aware- daughter is supposed to be coming in soon.     Subjective:   No complaints this morning when seen during HD. No other new issues.      ROS (12 point review of systems completed.  Pertinent positives noted. Otherwise ROS is negative)     Medications:  Reviewed    Infusion Medications    dextrose Stopped (10/25/24 1019)    sodium chloride       Scheduled Medications    [START ON 10/31/2024] insulin glargine  5 Units SubCUTAneous Daily    haloperidol lactate  2 mg IntraMUSCular Once    carvedilol  12.5 mg Oral BID WC    hydrALAZINE  25 mg Oral TID    levETIRAcetam  500 mg Oral BID    insulin lispro   encephalomalacia in the left and to a lesser extent right occipital   lobes and in the left posterior temporal lobe. There is associated   susceptibility artifact.   2. Increased signal intensity in the white matter and maryan most likely   representing ischemic changes. No evidence of an acute infarct.   3. 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   4. Mild atrophy.               **This report has been created using voice recognition software. It may contain   minor errors which are inherent in voice recognition technology.**         Electronically signed by Dr. Kreri Hughes      CT HEAD WO CONTRAST   Final Result   1. Multiple remote infarcts. No acute intracranial finding.      This document has been electronically signed by: Jimmy Hernandez MD on    10/23/2024 09:54 PM      All CTs at this facility use dose modulation techniques and iterative    reconstructions, and/or weight-based dosing   when appropriate to reduce radiation to a low as reasonably achievable.      XR CHEST PORTABLE   Final Result   1. Left-sided pleural effusion.   2. Mild stable cardiomegaly.               **This report has been created using voice recognition software. It may contain   minor errors which are inherent in voice recognition technology.**            Electronically signed by Dr. Nicolas Hudson      XR CHEST PORTABLE   Final Result   1. Small left-sided pleural effusion.   2. Mild stable cardiomegaly.               **This report has been created using voice recognition software. It may contain   minor errors which are inherent in voice recognition technology.**            Electronically signed by Dr. Nicolas Hudson        CT HEAD WO CONTRAST    Result Date: 10/23/2024  CT head without contrast Comparison: CT/SR - CTA HEAD W WO CONTRAST - 10/19/2024 08:38 PM EDT CT/SR - CT HEAD WO CONTRAST - 10/19/2024 08:38 PM EDT Findings: Multiple bilateral parietal lobe and left occipital lobe infarcts. Moderate global

## 2024-10-30 NOTE — PROCEDURES
PROCEDURE NOTE  Date: 10/30/2024   Name: Shahrzad Gregory  YOB: 1970    Procedures  EKG completed

## 2024-10-30 NOTE — PLAN OF CARE
Problem: Safety - Medical Restraint  Goal: Remains free of injury from restraints (Restraint for Interference with Medical Device)  Description: INTERVENTIONS:  1. Determine that other, less restrictive measures have been tried or would not be effective before applying the restraint  2. Evaluate the patient's condition at the time of restraint application  3. Inform patient/family regarding the reason for restraint  4. Q2H: Monitor safety, psychosocial status, comfort, nutrition and hydration  Recent Flowsheet Documentation  Taken 10/30/2024 9583 by Consuelo Nick RN  Remains free of injury from restraints (restraint for interference with medical device):   Determine that other, less restrictive measures have been tried or would not be effective before applying the restraint   Evaluate the patient's condition at the time of restraint application   Inform patient/family regarding the reason for restraint   Every 2 hours: Monitor safety, psychosocial status, comfort, nutrition and hydration

## 2024-10-30 NOTE — PROGRESS NOTES
[facility]  PHYSICAL THERAPY MISSED TREATMENT NOTE  STRZ ICU STEPDOWN TELEMETRY 4K    Date: 10/30/2024  Patient Name: Shahrzad Gregory        MRN: [id number]   : 1970  (54 y.o.)  Gender: female                REASON FOR MISSED TREATMENT:  Patient unable to participate.      Pt continues to be on strict bedrest as well as not appropriate for therapy due to increased agitation and denies to participate with  and staff. At this time PT will sign off. Please re-order if pt becomes appropriate and Therapy needs arise.

## 2024-10-30 NOTE — FLOWSHEET NOTE
10/30/24 1715   Treatment Team Notification   Reason for Communication Review case   Name of Team Member Notified Dr Romero   Treatment Team Role Attending Provider   Method of Communication Secure Message   Response See orders  (soft wrist restraints ordered)   Notification Time 1715     Patient was updated daughter on her way to visit, she spoke with son on phone. Patient taken around floor in wheelchair, redirected to chair in room, and started to punch RN and sitter.   Order to give Haldol IV once and soft wrist restraints.     1725 son arrived on floor.

## 2024-10-30 NOTE — FLOWSHEET NOTE
10/30/24 1615   Vital Signs   BP (!) 128/44   Temp 97.7 °F (36.5 °C)   Pulse 69   Respirations 14   Weight - Scale 79.1 kg (174 lb 6.1 oz)   Weight Method Bed scale   Percent Weight Change -3.65   Post-Hemodialysis Assessment   Post-Treatment Procedures Blood returned;Catheter Capped, clamped with Saline x2 ports   Machine Disinfection Process Acid/Vinegar Clean;Heat Disinfect;Exterior Machine Disinfection   Rinseback Volume (ml) 400 ml   Blood Volume Processed (Liters) 68 L   Dialyzer Clearance Moderately streaked   Duration of Treatment (minutes) 210 minutes   Hemodialysis Output (ml) 3000 ml   Tolerated Treatment Good     completed 3.5 hr HD TX and removed 3 Liters of fluid. pt tolerated HD TX well. sitter by bedside for pt safety. Dialysis TX ended, all blood returned with 400 mls rinse back. CVC dressing is clean, dry and intact. report given to primary nurse, record completed and printed to be scanned into pt's EMR.

## 2024-10-31 ENCOUNTER — APPOINTMENT (OUTPATIENT)
Dept: CT IMAGING | Age: 54
DRG: 100 | End: 2024-10-31
Payer: COMMERCIAL

## 2024-10-31 ENCOUNTER — APPOINTMENT (OUTPATIENT)
Dept: GENERAL RADIOLOGY | Age: 54
DRG: 100 | End: 2024-10-31
Payer: COMMERCIAL

## 2024-10-31 PROBLEM — R56.9 SEIZURE (HCC): Status: ACTIVE | Noted: 2024-10-31

## 2024-10-31 PROBLEM — G40.901 STATUS EPILEPTICUS (HCC): Status: ACTIVE | Noted: 2024-10-31

## 2024-10-31 PROBLEM — Z91.148 NONCOMPLIANCE WITH MEDICATION REGIMEN: Status: ACTIVE | Noted: 2024-09-26

## 2024-10-31 LAB
ALBUMIN SERPL BCG-MCNC: 3.2 G/DL (ref 3.5–5.1)
ALP SERPL-CCNC: 165 U/L (ref 38–126)
ALT SERPL W/O P-5'-P-CCNC: 17 U/L (ref 11–66)
AMPHETAMINES UR QL SCN: NEGATIVE
ANION GAP SERPL CALC-SCNC: 10 MEQ/L (ref 8–16)
ANION GAP SERPL CALC-SCNC: 14 MEQ/L (ref 8–16)
ANION GAP SERPL CALC-SCNC: 9 MEQ/L (ref 8–16)
AST SERPL-CCNC: 23 U/L (ref 5–40)
BACTERIA: ABNORMAL
BARBITURATES UR QL SCN: NEGATIVE
BASOPHILS ABSOLUTE: 0.1 THOU/MM3 (ref 0–0.1)
BASOPHILS NFR BLD AUTO: 1.1 %
BENZODIAZ UR QL SCN: POSITIVE
BILIRUB SERPL-MCNC: 0.3 MG/DL (ref 0.3–1.2)
BILIRUB UR QL STRIP: NEGATIVE
BUN SERPL-MCNC: 12 MG/DL (ref 7–22)
BUN SERPL-MCNC: 16 MG/DL (ref 7–22)
BUN SERPL-MCNC: 17 MG/DL (ref 7–22)
BZE UR QL SCN: NEGATIVE
CA-I BLD ISE-SCNC: 0.94 MMOL/L (ref 1.12–1.32)
CALCIUM SERPL-MCNC: 7.7 MG/DL (ref 8.5–10.5)
CALCIUM SERPL-MCNC: 7.8 MG/DL (ref 8.5–10.5)
CALCIUM SERPL-MCNC: 7.8 MG/DL (ref 8.5–10.5)
CANNABINOIDS UR QL SCN: NEGATIVE
CASTS #/AREA URNS LPF: ABNORMAL /LPF
CASTS #/AREA URNS LPF: ABNORMAL /LPF
CHARACTER UR: ABNORMAL
CHARCOAL URNS QL MICRO: ABNORMAL
CHLORIDE SERPL-SCNC: 100 MEQ/L (ref 98–111)
CHLORIDE SERPL-SCNC: 101 MEQ/L (ref 98–111)
CHLORIDE SERPL-SCNC: 98 MEQ/L (ref 98–111)
CO2 SERPL-SCNC: 26 MEQ/L (ref 23–33)
CO2 SERPL-SCNC: 29 MEQ/L (ref 23–33)
CO2 SERPL-SCNC: 29 MEQ/L (ref 23–33)
COLOR UR: YELLOW
CREAT SERPL-MCNC: 2.4 MG/DL (ref 0.4–1.2)
CREAT SERPL-MCNC: 2.9 MG/DL (ref 0.4–1.2)
CREAT SERPL-MCNC: 3.1 MG/DL (ref 0.4–1.2)
CRYSTALS URNS QL MICRO: ABNORMAL
DEPRECATED RDW RBC AUTO: 48.6 FL (ref 35–45)
DEPRECATED RDW RBC AUTO: 49.7 FL (ref 35–45)
EKG ATRIAL RATE: 60 BPM
EKG P AXIS: 70 DEGREES
EKG P-R INTERVAL: 160 MS
EKG Q-T INTERVAL: 472 MS
EKG QRS DURATION: 104 MS
EKG QTC CALCULATION (BAZETT): 472 MS
EKG R AXIS: 84 DEGREES
EKG T AXIS: 59 DEGREES
EKG VENTRICULAR RATE: 60 BPM
EOSINOPHIL NFR BLD AUTO: 4.4 %
EOSINOPHILS ABSOLUTE: 0.3 THOU/MM3 (ref 0–0.4)
EPITHELIAL CELLS, UA: ABNORMAL /HPF
ERYTHROCYTE [DISTWIDTH] IN BLOOD BY AUTOMATED COUNT: 15.5 % (ref 11.5–14.5)
ERYTHROCYTE [DISTWIDTH] IN BLOOD BY AUTOMATED COUNT: 15.5 % (ref 11.5–14.5)
FENTANYL: NEGATIVE
GFR SERPL CREATININE-BSD FRML MDRD: 17 ML/MIN/1.73M2
GFR SERPL CREATININE-BSD FRML MDRD: 19 ML/MIN/1.73M2
GFR SERPL CREATININE-BSD FRML MDRD: 24 ML/MIN/1.73M2
GLUCOSE BLD STRIP.AUTO-MCNC: 109 MG/DL (ref 70–108)
GLUCOSE BLD STRIP.AUTO-MCNC: 112 MG/DL (ref 70–108)
GLUCOSE BLD STRIP.AUTO-MCNC: 97 MG/DL (ref 70–108)
GLUCOSE SERPL-MCNC: 103 MG/DL (ref 70–108)
GLUCOSE SERPL-MCNC: 104 MG/DL (ref 70–108)
GLUCOSE SERPL-MCNC: 135 MG/DL (ref 70–108)
GLUCOSE UR QL STRIP.AUTO: NEGATIVE MG/DL
HCT VFR BLD AUTO: 27.2 % (ref 37–47)
HCT VFR BLD AUTO: 27.9 % (ref 37–47)
HCT VFR BLD AUTO: 28.3 % (ref 37–47)
HGB BLD-MCNC: 8.4 GM/DL (ref 12–16)
HGB BLD-MCNC: 8.4 GM/DL (ref 12–16)
HGB BLD-MCNC: 8.8 GM/DL (ref 12–16)
HGB UR QL STRIP.AUTO: NEGATIVE
IMM GRANULOCYTES # BLD AUTO: 0.01 THOU/MM3 (ref 0–0.07)
IMM GRANULOCYTES NFR BLD AUTO: 0.2 %
KETONES UR QL STRIP.AUTO: NEGATIVE
LACTATE SERPL-SCNC: 0.8 MMOL/L (ref 0.5–2)
LEUKOCYTE ESTERASE UR QL STRIP.AUTO: NEGATIVE
LYMPHOCYTES ABSOLUTE: 2.2 THOU/MM3 (ref 1–4.8)
LYMPHOCYTES NFR BLD AUTO: 36.8 %
MAGNESIUM SERPL-MCNC: 2 MG/DL (ref 1.6–2.4)
MCH RBC QN AUTO: 26.3 PG (ref 26–33)
MCH RBC QN AUTO: 27 PG (ref 26–33)
MCHC RBC AUTO-ENTMCNC: 30.1 GM/DL (ref 32.2–35.5)
MCHC RBC AUTO-ENTMCNC: 31.1 GM/DL (ref 32.2–35.5)
MCV RBC AUTO: 86.8 FL (ref 81–99)
MCV RBC AUTO: 87.5 FL (ref 81–99)
MONOCYTES ABSOLUTE: 0.8 THOU/MM3 (ref 0.4–1.3)
MONOCYTES NFR BLD AUTO: 13.5 %
NEUTROPHILS ABSOLUTE: 2.7 THOU/MM3 (ref 1.8–7.7)
NEUTROPHILS NFR BLD AUTO: 44 %
NITRITE UR QL STRIP.AUTO: NEGATIVE
NRBC BLD AUTO-RTO: 0 /100 WBC
NT-PROBNP SERPL IA-MCNC: ABNORMAL PG/ML (ref 0–124)
OPIATES UR QL SCN: NEGATIVE
OXYCODONE: NEGATIVE
PCP UR QL SCN: NEGATIVE
PH UR STRIP.AUTO: 6 [PH] (ref 5–9)
PHOSPHATE SERPL-MCNC: 3 MG/DL (ref 2.4–4.7)
PLATELET # BLD AUTO: 348 THOU/MM3 (ref 130–400)
PLATELET # BLD AUTO: 353 THOU/MM3 (ref 130–400)
PMV BLD AUTO: 10.3 FL (ref 9.4–12.4)
PMV BLD AUTO: 9.9 FL (ref 9.4–12.4)
POTASSIUM SERPL-SCNC: 3.6 MEQ/L (ref 3.5–5.2)
POTASSIUM SERPL-SCNC: 4.1 MEQ/L (ref 3.5–5.2)
POTASSIUM SERPL-SCNC: 4.1 MEQ/L (ref 3.5–5.2)
PROCALCITONIN SERPL IA-MCNC: 1.03 NG/ML (ref 0.01–0.09)
PROT SERPL-MCNC: 6.3 G/DL (ref 6.1–8)
PROT UR STRIP.AUTO-MCNC: >= 300 MG/DL
RBC # BLD AUTO: 3.19 MILL/MM3 (ref 4.2–5.4)
RBC # BLD AUTO: 3.26 MILL/MM3 (ref 4.2–5.4)
RBC #/AREA URNS HPF: ABNORMAL /HPF
REASON FOR REJECTION: NORMAL
REJECTED TEST: NORMAL
RENAL EPI CELLS #/AREA URNS HPF: ABNORMAL /[HPF]
SODIUM SERPL-SCNC: 138 MEQ/L (ref 135–145)
SODIUM SERPL-SCNC: 138 MEQ/L (ref 135–145)
SODIUM SERPL-SCNC: 140 MEQ/L (ref 135–145)
SPECIFIC GRAVITY UA: 1.02 (ref 1–1.03)
TROPONIN, HIGH SENSITIVITY: 208 NG/L (ref 0–12)
UROBILINOGEN, URINE: 0.2 EU/DL (ref 0–1)
WBC # BLD AUTO: 6.1 THOU/MM3 (ref 4.8–10.8)
WBC # BLD AUTO: 6.8 THOU/MM3 (ref 4.8–10.8)
WBC #/AREA URNS HPF: ABNORMAL /HPF
YEAST LIKE FUNGI URNS QL MICRO: ABNORMAL

## 2024-10-31 PROCEDURE — 6360000002 HC RX W HCPCS: Performed by: STUDENT IN AN ORGANIZED HEALTH CARE EDUCATION/TRAINING PROGRAM

## 2024-10-31 PROCEDURE — 95720 EEG PHY/QHP EA INCR W/VEEG: CPT | Performed by: PSYCHIATRY & NEUROLOGY

## 2024-10-31 PROCEDURE — 85027 COMPLETE CBC AUTOMATED: CPT

## 2024-10-31 PROCEDURE — 83605 ASSAY OF LACTIC ACID: CPT

## 2024-10-31 PROCEDURE — C9254 INJECTION, LACOSAMIDE: HCPCS | Performed by: NURSE PRACTITIONER

## 2024-10-31 PROCEDURE — 70450 CT HEAD/BRAIN W/O DYE: CPT

## 2024-10-31 PROCEDURE — 83735 ASSAY OF MAGNESIUM: CPT

## 2024-10-31 PROCEDURE — 36415 COLL VENOUS BLD VENIPUNCTURE: CPT

## 2024-10-31 PROCEDURE — 51701 INSERT BLADDER CATHETER: CPT

## 2024-10-31 PROCEDURE — 87086 URINE CULTURE/COLONY COUNT: CPT

## 2024-10-31 PROCEDURE — 99233 SBSQ HOSP IP/OBS HIGH 50: CPT | Performed by: STUDENT IN AN ORGANIZED HEALTH CARE EDUCATION/TRAINING PROGRAM

## 2024-10-31 PROCEDURE — 85025 COMPLETE CBC W/AUTO DIFF WBC: CPT

## 2024-10-31 PROCEDURE — 80307 DRUG TEST PRSMV CHEM ANLYZR: CPT

## 2024-10-31 PROCEDURE — 87040 BLOOD CULTURE FOR BACTERIA: CPT

## 2024-10-31 PROCEDURE — 71045 X-RAY EXAM CHEST 1 VIEW: CPT

## 2024-10-31 PROCEDURE — 2580000003 HC RX 258

## 2024-10-31 PROCEDURE — 6370000000 HC RX 637 (ALT 250 FOR IP)

## 2024-10-31 PROCEDURE — 84484 ASSAY OF TROPONIN QUANT: CPT

## 2024-10-31 PROCEDURE — 2580000003 HC RX 258: Performed by: NURSE PRACTITIONER

## 2024-10-31 PROCEDURE — 2000000000 HC ICU R&B

## 2024-10-31 PROCEDURE — 6360000002 HC RX W HCPCS: Performed by: NURSE PRACTITIONER

## 2024-10-31 PROCEDURE — 83880 ASSAY OF NATRIURETIC PEPTIDE: CPT

## 2024-10-31 PROCEDURE — 99255 IP/OBS CONSLTJ NEW/EST HI 80: CPT | Performed by: NURSE PRACTITIONER

## 2024-10-31 PROCEDURE — 99232 SBSQ HOSP IP/OBS MODERATE 35: CPT | Performed by: INTERNAL MEDICINE

## 2024-10-31 PROCEDURE — 6370000000 HC RX 637 (ALT 250 FOR IP): Performed by: NURSE PRACTITIONER

## 2024-10-31 PROCEDURE — 99291 CRITICAL CARE FIRST HOUR: CPT | Performed by: INTERNAL MEDICINE

## 2024-10-31 PROCEDURE — 80053 COMPREHEN METABOLIC PANEL: CPT

## 2024-10-31 PROCEDURE — 84100 ASSAY OF PHOSPHORUS: CPT

## 2024-10-31 PROCEDURE — 95711 VEEG 2-12 HR UNMONITORED: CPT

## 2024-10-31 PROCEDURE — 95819 EEG AWAKE AND ASLEEP: CPT

## 2024-10-31 PROCEDURE — 84145 PROCALCITONIN (PCT): CPT

## 2024-10-31 PROCEDURE — 92526 ORAL FUNCTION THERAPY: CPT

## 2024-10-31 PROCEDURE — 85018 HEMOGLOBIN: CPT

## 2024-10-31 PROCEDURE — 93005 ELECTROCARDIOGRAM TRACING: CPT

## 2024-10-31 PROCEDURE — 82948 REAGENT STRIP/BLOOD GLUCOSE: CPT

## 2024-10-31 PROCEDURE — 93010 ELECTROCARDIOGRAM REPORT: CPT | Performed by: INTERNAL MEDICINE

## 2024-10-31 PROCEDURE — 85014 HEMATOCRIT: CPT

## 2024-10-31 PROCEDURE — 81001 URINALYSIS AUTO W/SCOPE: CPT

## 2024-10-31 PROCEDURE — 82330 ASSAY OF CALCIUM: CPT

## 2024-10-31 RX ORDER — LEVETIRACETAM 500 MG/5ML
500 INJECTION, SOLUTION, CONCENTRATE INTRAVENOUS EVERY 12 HOURS
Status: DISCONTINUED | OUTPATIENT
Start: 2024-10-31 | End: 2024-11-01 | Stop reason: HOSPADM

## 2024-10-31 RX ORDER — HYDRALAZINE HYDROCHLORIDE 20 MG/ML
10 INJECTION INTRAMUSCULAR; INTRAVENOUS ONCE
Status: COMPLETED | OUTPATIENT
Start: 2024-11-01 | End: 2024-10-31

## 2024-10-31 RX ORDER — LORAZEPAM 2 MG/ML
0.5 INJECTION INTRAMUSCULAR ONCE
Status: DISCONTINUED | OUTPATIENT
Start: 2024-10-31 | End: 2024-11-01 | Stop reason: HOSPADM

## 2024-10-31 RX ORDER — PROPOFOL 10 MG/ML
INJECTION, EMULSION INTRAVENOUS
Status: DISCONTINUED
Start: 2024-10-31 | End: 2024-10-31 | Stop reason: WASHOUT

## 2024-10-31 RX ORDER — HYDRALAZINE HYDROCHLORIDE 20 MG/ML
10 INJECTION INTRAMUSCULAR; INTRAVENOUS
Status: COMPLETED | OUTPATIENT
Start: 2024-10-31 | End: 2024-10-31

## 2024-10-31 RX ORDER — BUMETANIDE 0.25 MG/ML
1 INJECTION, SOLUTION INTRAMUSCULAR; INTRAVENOUS ONCE
Status: DISCONTINUED | OUTPATIENT
Start: 2024-10-31 | End: 2024-11-01 | Stop reason: HOSPADM

## 2024-10-31 RX ORDER — LORAZEPAM 2 MG/ML
2 INJECTION INTRAMUSCULAR ONCE
Status: COMPLETED | OUTPATIENT
Start: 2024-10-31 | End: 2024-10-31

## 2024-10-31 RX ADMIN — LEVETIRACETAM 500 MG: 100 INJECTION INTRAVENOUS at 17:02

## 2024-10-31 RX ADMIN — HYDRALAZINE HYDROCHLORIDE 10 MG: 20 INJECTION INTRAMUSCULAR; INTRAVENOUS at 18:33

## 2024-10-31 RX ADMIN — SODIUM CHLORIDE, PRESERVATIVE FREE 10 ML: 5 INJECTION INTRAVENOUS at 21:05

## 2024-10-31 RX ADMIN — LACOSAMIDE 200 MG: 10 INJECTION INTRAVENOUS at 21:05

## 2024-10-31 RX ADMIN — HYDRALAZINE HYDROCHLORIDE 10 MG: 20 INJECTION INTRAMUSCULAR; INTRAVENOUS at 23:49

## 2024-10-31 RX ADMIN — ACETAMINOPHEN 650 MG: 650 SUPPOSITORY RECTAL at 12:45

## 2024-10-31 RX ADMIN — CHLORHEXIDINE GLUCONATE, 0.12% ORAL RINSE 15 ML: 1.2 SOLUTION DENTAL at 21:07

## 2024-10-31 RX ADMIN — LORAZEPAM 2 MG: 2 INJECTION INTRAMUSCULAR; INTRAVENOUS at 15:11

## 2024-10-31 RX ADMIN — LACOSAMIDE 200 MG: 10 INJECTION INTRAVENOUS at 16:23

## 2024-10-31 ASSESSMENT — PAIN SCALES - GENERAL: PAINLEVEL_OUTOF10: 0

## 2024-10-31 NOTE — PROGRESS NOTES
[facility]     Neurodiagnostic Laboratory Technician Worksheet      EEG Date: 10/31/2024    Name: Shahrzad Gregory  : 1970  Age: 54 y.o.  Sex: female  MRN: [id number]  CSN: [id number]    [facility]  Ordering Provider: NADEGE Romero    EEG Number: [id number]    Time In: 1341   Time Out: 1401  Total Treatment Time: 20    Clinical History: unresponsiveness, admit tremors, altered mental status, fever 100.6  hx ERSF,  Ct  IMPRESSION:  1. No acute intracranial hemorrhage  2. Areas of old infarct in the right temporal lobe and right temporo-occipital  lobe.  3. Mild diffuse cerebral volume loss.     Hand Dominance: unknown   Sedation: No   H.V. Completed: No,no command follow   Photic: No   Sleep: No   Drowsy: No   Sleep Deprived: No   Seizures Observed: No clinical,  no command follow,    Mentality: obtunded     Technician: Janine Rodriguez 10/31/2024

## 2024-10-31 NOTE — PROCEDURES
EEG REPORT       Patient: Shahrzad Gregory Age: 54 y.o.  MRN: [id number]      Referring Provider: Trang Maciel DO    History: This routine 1 hour 20-minute minute scalp EEG was recorded with video- monitoring for a 54 y.o.. female who presented with encephalopathy. This EEG was performed to evaluate for focal and epileptiform abnormalities.     Shahrzad Gregory   Current Facility-Administered Medications   Medication Dose Route Frequency Provider Last Rate Last Admin    LORazepam (ATIVAN) injection 0.5 mg  0.5 mg IntraVENous Once Sheri Romero MD        levETIRAcetam (KEPPRA) injection 500 mg  500 mg IntraVENous Q12H Sheri Romero MD        lacosamide (VIMPAT) 200 mg in sodium chloride 0.9 % 70 mL IVPB  200 mg IntraVENous Once Venus Arroyo APRN - CNP        lacosamide (VIMPAT) 100 mg in sodium chloride 0.9 % 60 mL IVPB  100 mg IntraVENous BID Venus Arroyo APRN - CNP        bumetanide (BUMEX) injection 1 mg  1 mg IntraVENous Once Sheri Romero MD        insulin glargine (LANTUS) injection vial 5 Units  5 Units SubCUTAneous Daily Sheri Romero MD        carvedilol (COREG) tablet 12.5 mg  12.5 mg Oral BID WC Sheri Romero MD   12.5 mg at 10/30/24 1643    hydrALAZINE (APRESOLINE) tablet 25 mg  25 mg Oral TID Sheri Romero MD   25 mg at 10/30/24 1952    ALPRAZolam (XANAX) tablet 0.25 mg  0.25 mg Oral Nightly PRN Sheri Romero MD   0.25 mg at 10/28/24 2313    [Held by provider] levETIRAcetam (KEPPRA) 100 MG/ML oral solution 500 mg  500 mg Oral BID Darion Khan MD   500 mg at 10/30/24 1951    glucose chewable tablet 16 g  4 tablet Oral PRN Rishabh Pratt DO        dextrose bolus 10% 125 mL  125 mL IntraVENous PRN Rishabh Pratt DO   Stopped at 10/25/24 1235    Or    dextrose bolus 10% 250 mL  250 mL IntraVENous PRN Rishabh Pratt,    Stopped at 10/24/24 0419    dextrose 10 % infusion   IntraVENous Continuous PRN Rishabh Pratt,    Stopped at  Clinical Neurophysiology  Diplomate, American Board of Epilepsy

## 2024-10-31 NOTE — DISCHARGE SUMMARY
lacosamide (VIMPAT) 200 mg in sodium chloride 0.9 % 70 mL IVPB  200 mg IntraVENous BID    insulin glargine  5 Units SubCUTAneous Daily    carvedilol  12.5 mg Oral BID WC    hydrALAZINE  25 mg Oral TID    [Held by provider] levETIRAcetam  500 mg Oral BID    insulin lispro  0-8 Units SubCUTAneous 4x Daily AC & HS    [Held by provider] bumetanide  1 mg IntraVENous Daily    epoetin george-epbx  6,000 Units SubCUTAneous Once per day on Tuesday Thursday Saturday    chlorhexidine  15 mL Mouth/Throat BID    sodium chloride flush  5-40 mL IntraVENous 2 times per day    apixaban  5 mg Oral BID    aspirin  81 mg Oral Daily    atorvastatin  40 mg Oral QPM    clopidogrel  75 mg Oral Daily    folic acid  1 mg Oral Daily    [Held by provider] losartan  100 mg Oral Daily    [Held by provider] metOLazone  2.5 mg Oral Daily    sertraline  50 mg Oral QPM    [Held by provider] sevelamer  800 mg Oral TID WC     Continuous Infusions:   dextrose Stopped (10/25/24 1019)    sodium chloride         PHYSICAL EXAMINATION:  T: 99.1.  P: 62. RR: 13. B/P: 153/55.  FiO2: Room air. O2 Sat: 94.  I/O: Net -2.4 L  Body mass index is 33.06 kg/m².   GCS:   6, however the patient is not compliant with GCS scoring  General:   Obese female with lethargic responses, noncompliant with commands  HEENT:  normocephalic and atraumatic.  No scleral icterus. PERR  Neck: supple.  No Thyromegaly.  Lungs: clear to auscultation.  No retractions  Cardiac: RRR.  No JVD.  Abdomen: soft.  Nontender.  Extremities:  No clubbing, cyanosis, or edema x 4.    Vasculature: capillary refill < 3 seconds. Palpable dorsalis pedis pulses.  Skin:  warm and dry.  Psych:  Alert and oriented x3.  Affect appropriate  Lymph:  No supraclavicular adenopathy.  Neurologic:  No focal deficit. No seizures.    Data: (All radiographs, tracings, PFTs, and imaging are personally viewed and interpreted unless otherwise noted).   Sodium 138, potassium 4.1, chloride 100, carbon dioxide 29, BUN 16,  creatinine 2.9, anion gap 9, EGFR 19, glucose 135, calcium 7.8  Albumin 3.2, alk phos 165, ALT 17, AST 23, T. bili 0.3, total protein 6.3  WBC 6.1, hemoglobin 8.4, MCV 87.5, platelet 348  Telemetry shows normal sinus rhythm, almost bradycardia        Seen with multidisciplinary ICU team; nephrology and neurology following.  Meets Continued ICU Level Care Criteria:    [] Yes   [x] No - Transfer Planned to listed location: [place] in [facility]  [] HOSPITALIST CONTACTED- DR     Case and plan discussed with Dr. Helms.        Electronically signed by Miguel Sotelo DO  CRITICAL CARE SPECIALIST  Patient seen by me including key components of medical care.  Case discussed with resident physician.  Patient transferred to ICU for concern for status epilepticus.  Continuous EEG shows no status.  Vimpat initiated.  Patient had declined Keppra earlier in the day.  Italicized bold font, if present,  represents changes to the note made by me.  CC time 35 minutes.  Time was discontiguous. Time does not include procedure. Time does include my direct assessment of the patient and coordination of care.  Time represents more than 50% of the time involved with patient care by the CC team.  Electronically signed by Bennie Helms MD.

## 2024-10-31 NOTE — PROGRESS NOTES
[facility]  OCCUPATIONAL THERAPY MISSED TREATMENT NOTE  STRZ ICU STEPDOWN TELEMETRY 4K  4K-25/025-A      Date: 10/31/2024  Patient Name: Shahrzad Gregory        CSN: [id number]   : 1970  (54 y.o.)  Gender: female                REASON FOR MISSED TREATMENT: Patient Refused.  Many attempts made to see patient for OT evaluation however patient declined working with therapy. Many times patient refusing to talk with therapy or even make eye contact. Many accomodation made for patient including video  and now has a live in person sitter. Patient continues to look ahead and not look or interact with therapist or  with max encouragement. OT to sign off at this time due to many missed visits.        Charlotte Robles, OTR/L [id number]

## 2024-10-31 NOTE — CONSULTS
Neurology Consult Note    Date:10/31/2024       Room:-15/015-A  Patient Name:Shahrzad Gregory     YOB: 1970     Age:54 y.o.    Requesting Physician: Sheri Romero MD     Reason for Consult:  Evaluate for Seizure      Chief Complaint:   Chief Complaint   Patient presents with    Needs dialysis        Subjective     Shahrzad Gregory is a 54 y.o. female with a history of ESRD on hemodialysis, HFrEF 30%, Afib, iron deficiency anemia, non-insulin-dependent type 2 diabetes, CVA, hypertension, hyperlipidemia, left ICA stenosis, and noncompliance with medical therapy who presented to [facility] on 10/23/2024 to undergo dialysis treatment while receiving dialysis the patient developed seizure like activity requiring intubation. Neurology was consulted and the patient was started on Keppra 500mg BID, 10/27/2024. There was no further evidence of seizure activity until 10/31/24 when neurology was reconsulted for concern of status epilepticus on EEG. Apparently the patient had a level of conscious change this morning and the hospitalist ordered a CTH and an EEG. The patient was unresponsive and her eyes were fluttering. The patient was transferred to the ICU. Ativan 2mg IV given. Vimpat 200mg load ordered to be followed by 200mg BID. Dr. Mike the EEG reading neurologist reported at 1606 that the EEG was improved some, the sharp waves are less frequent and technically not in status at this time. Recommended transfer to [facility] for continuous EEG monitoring.     Review of Systems   Review of Systems   Neurological:  Positive for seizures.     Medications   Scheduled Meds:    LORazepam  0.5 mg IntraVENous Once    levETIRAcetam  500 mg IntraVENous Q12H    lacosamide (VIMPAT) 200 mg in sodium chloride 0.9 % 70 mL IVPB  200 mg IntraVENous Once    lacosamide (VIMPAT) 100 mg in sodium chloride 0.9 % 60 mL IVPB  100 mg IntraVENous BID    bumetanide  1 mg IntraVENous Once  densities are seen within the maryan. No ventriculomegaly.  No midline shift or mass effect.  No acute intracranial hemorrhage. No intracranial collection.  Gray-white differentiation is unremarkable. The posterior fossa is unremarkable. The craniocervical junction is unremarkable. No acute bony abnormality. The  paranasal sinuses are clear. The  mastoid air cells are clear. The orbits are unremarkable.     1. No acute intracranial hemorrhage 2. Areas of old infarct in the right temporal lobe and right temporo-occipital lobe. 3. Mild diffuse cerebral volume loss. **This report has been created using voice recognition software.  It may contain minor errors which are inherent in voice recognition technology.** Electronically signed by Dr. Cassandra Day        Assessment and Plan:        Status epilepticus  Hx of Recently diagnosed Seizure  Missed her Keppra in the morning (10/31)     Ativan 2mg IV x1 now  Vimpat 200mg bolus then 200mg BID   Keppra 500mg BID   Continue EEG  Seizure precautions   Transfer to a higher level of care where continuous EEG can be completed        This was discussed with Dr. Feliciano and he is in agreement with the assessment and plan.    Electronically signed by MARCIO GREENWOOD - CNP on 10/31/24 at 4:23 PM EDT     I agree with evaluation and plan.    Rome Feliciano MD

## 2024-10-31 NOTE — PROGRESS NOTES
SubCUTAneous Once per day on Tuesday Thursday Saturday    chlorhexidine  15 mL Mouth/Throat BID    sodium chloride flush  5-40 mL IntraVENous 2 times per day    apixaban  5 mg Oral BID    aspirin  81 mg Oral Daily    atorvastatin  40 mg Oral QPM    clopidogrel  75 mg Oral Daily    folic acid  1 mg Oral Daily    [Held by provider] losartan  100 mg Oral Daily    [Held by provider] metOLazone  2.5 mg Oral Daily    sertraline  50 mg Oral QPM    [Held by provider] sevelamer  800 mg Oral TID      Meds prn: ALPRAZolam, glucose, dextrose bolus **OR** dextrose bolus, dextrose, sodium chloride flush, sodium chloride, polyethylene glycol, acetaminophen **OR** acetaminophen, promethazine **OR** ondansetron, glucagon (rDNA)     Lab Data :  CBC:   Recent Labs     10/29/24  0753 10/31/24  0436   WBC 8.3  --    HGB 10.2* 8.4*   HCT 32.7* 27.2*     --      CMP:  Recent Labs     10/29/24  0753 10/30/24  0354 10/31/24  0436    135 140   K 3.3* 3.9 3.6   CL 95* 98 101   CO2 25 24 29   BUN 19 28* 12   CREATININE 2.5* 3.5* 2.4*   GLUCOSE 80 112* 103   CALCIUM 8.2* 7.8* 7.8*     Hepatic:   No results for input(s): \"LABALBU\", \"AST\", \"ALT\", \"BILITOT\", \"ALKPHOS\" in the last 72 hours.    Invalid input(s): \"ALB\"        Assessment and Plan:  ESRD on HD  Continue dialysis Mondays, Wednesdays and Fridays.  Patient is originally from [place].  Was getting dialyzed in [place].  Apparently was only getting dialyzed 1-2 times per week through the emergency room in [place] ?due to insurance issues.  Reportedly she is here temporarily (son is on temp job assignment here in [place])  Plan dialysis treatment tomorrow  About 3 L were removed yesterday  Anemia in ESRD.  Continue Retacrit  Seizure  Hypertension  History of CVA  Diabetes mellitus  Paroxysmal atrial fibrillation  History of noncompliance  Pleural effusion    Masoud Osborn MD  [facility]    This report has been created using voice recognition software. It may

## 2024-10-31 NOTE — PROGRESS NOTES
[facility]  INPATIENT SPEECH THERAPY  STRZ ICU STEPDOWN TELEMETRY 4K  DAILY NOTE    TIME   SLP Individual Minutes  Time In: 907  Time Out: 916  Minutes: 9  Timed Code Treatment Minutes: 0 Minutes       Date: 10/31/2024  Patient Name: Shahrzad Gregory      CSN: [id number]   : 1970  (54 y.o.)  Gender: female   Referring Physician:  Sheri Romero MD     Diagnosis: Respiratory arrest (HCC)  Precautions: Aspiration risk, seizure, fall risk  Current Diet: Soft and bite-size diet, thin liquids   Respiratory Status: Room Air  Swallowing Strategies:  Full Upright Position, Small Bite/Sip, Pulmonary Monitoring, Medications Whole with Thin, Direct 1:1 Supervision, Alternate Solids and Liquids, Limit Distractions, and Monitor for Fatigue      Date of Last MBS/FEES: Not Applicable    Pain:  No pain reported.    Subjective:  BUCK Yo approved completion of dysphagia tx. Patient awake in bed consuming breakfast meal with live sitter and live interpretor. Patient with appropriate alertness; however, flat affect with limited-0 interaction with SLP or basic command following. Agreeable to advanced PO trial of toast. No family present at bedside.    Short-Term Goals:  SHORT TERM GOAL #1:  Goal 1: Patient will consume a soft and bite-size diet and thin liquids, advanced PO trials as clinically indicated, with stable pulmonary status and incorporation of trained compensatory strategies to assist with nutrition/hydration measures  INTERVENTIONS: Patient with consumption of advanced PO trial (toast). Patient with slow rotary mastication with suspected adequate bolus clearance. Patient with limited direction following for ST assess clearance from oral cavity. Consistent swallow initiation with no overt s/s of aspiration following trials of thin liquids via straw. Unable to fully assess pharyngeal swallow function and totality of airway invasion events without presence of instrumental evaluation; however,

## 2024-10-31 NOTE — PROCEDURES
PROCEDURE NOTE  Date: 10/31/2024   Name: Shahrzad Gregory  YOB: 1970    Procedures  12 lead EKG completed. Results handed to Dennis Rodriguez CET

## 2024-10-31 NOTE — CARE COORDINATION
10/31/24, 12:35 PM EDT    DISCHARGE ON GOING EVALUATION    [facility]  [facility]025-A Reason for admit: Respiratory arrest (HCC) [R09.2]  ESRF (end stage renal failure) (HCC) [N18.6]   Procedures:   10/24 EEG  10/25 Continuous EEG  10/31 Cerebell planned  Barriers to Discharge:   From IP HD to ICU (ventilator there)  ESRD/Seizure/AMS  History: CVA, OHS, CKD, DM  IV Ativan  Refused Psychiatry Consult  Await Cerebell Procedure  Increased Seizure-Like Activity; assigned to 4A; Hand-Off to JANEEN Benitez cm  PCP: Trang Maciel,   Readmission Risk Score: 34.5  PCP: Trang Maciel DO  Readmission Risk Score: 35.3  Patient Goals/Plan/Treatment Preferences: sitter in room, insurance not accepted locally but once katey Brooks @ [phone] moves to [place] late December 2024, [facility] will accept insurance, he transports, he is working here locally; had [place] HD in past, not [place] Citizen, speaks [place]; AMA in past, refuses therapy at times

## 2024-10-31 NOTE — PROGRESS NOTES
Patient arrived to unit from 4D via 4A. Patient transferred to ICU bed and placed on continuous ICU bedside monitor. Patient admitted for Concern for Status Epilepticus  Vitals obtained. See flowsheets. Patient's IV access includes 24g. Current infusions and rates of infusion include n/a. Assessment completed by BUCK Collins/ Jadyn RN. Two nurse skin assessment completed by Sukhdeep and Jadyn. See flowsheets for assessment details. Policies and procedures of ICU unable to be explained to patient at this time. Family member(s)/representative(s) present at time of admission include none. Patient rights explained to family member(s)/representatives and patient, as able. Patient/patient's family member(s)/representative(s) Declined to have physician notified of their admission. All questions posed by patient's family member(s)/representative(s) and patient answered at this time.

## 2024-10-31 NOTE — PROGRESS NOTES
[facility]      CT HEAD WO CONTRAST   Final Result   1. Multiple remote infarcts. No acute intracranial finding.      This document has been electronically signed by: Jimmy Hernandez MD on    10/23/2024 09:54 PM      All CTs at this facility use dose modulation techniques and iterative    reconstructions, and/or weight-based dosing   when appropriate to reduce radiation to a low as reasonably achievable.      XR CHEST PORTABLE   Final Result   1. Left-sided pleural effusion.   2. Mild stable cardiomegaly.               **This report has been created using voice recognition software. It may contain   minor errors which are inherent in voice recognition technology.**            Electronically signed by Dr. Nicolas Hudson      XR CHEST PORTABLE   Final Result   1. Small left-sided pleural effusion.   2. Mild stable cardiomegaly.               **This report has been created using voice recognition software. It may contain   minor errors which are inherent in voice recognition technology.**            Electronically signed by Dr. Nicolas Hudson        CT HEAD WO CONTRAST    Result Date: 10/23/2024  CT head without contrast Comparison: CT/SR - CTA HEAD W WO CONTRAST - 10/19/2024 08:38 PM EDT CT/SR - CT HEAD WO CONTRAST - 10/19/2024 08:38 PM EDT Findings: Multiple bilateral parietal lobe and left occipital lobe infarcts. Moderate global cerebral volume loss and chronic microvascular ischemic changes. No acute intracranial hemorrhage or abnormal extra-axial fluid collection. No findings of mass effect or midline shift. The ventricles and basilar cisterns are patent. No CT evidence of acute large territory infarct. Orbital structures normal. Paranasal sinuses and mastoid air cells are within normal limits.     1. Multiple remote infarcts. No acute intracranial finding. This document has been electronically signed by: Jimmy Hernandez MD on 10/23/2024 09:54 PM All CTs at this facility use dose modulation techniques and  iterative reconstructions, and/or weight-based dosing when appropriate to reduce radiation to a low as reasonably achievable.    XR CHEST PORTABLE    Result Date: 10/23/2024  PROCEDURE: XR CHEST PORTABLE CLINICAL INFORMATION: Endotracheal tube placement TECHNIQUE: Mobile AP chest radiograph. COMPARISON: Mobile AP chest radiograph 10/23/2024 at 4:23 p.m. FINDINGS: The tip of an endotracheal tube is now approximately 2.7 cm superior to the angus. Nasogastric tube and right-sided hemodialysis catheter are in stable position. Median sternotomy wires are again noted. There is mild stable enlargement of the cardiac silhouette. The left costophrenic angle is blunted. Degenerative changes in the thoracic spine are poorly visualized.     1. Left-sided pleural effusion. 2. Mild stable cardiomegaly. **This report has been created using voice recognition software. It may contain minor errors which are inherent in voice recognition technology.** Electronically signed by Dr. Nicolas Hudson    XR CHEST PORTABLE    Result Date: 10/23/2024  PROCEDURE: XR CHEST PORTABLE CLINICAL INFORMATION: Intubation TECHNIQUE: Mobile AP chest radiograph. COMPARISON: Mobile AP chest radiograph 10/20/2024 FINDINGS: The tip of an endotracheal tube is at the angus. A nasogastric tube courses into the left upper abdomen. The tip is not visible but the sidehole is distal to the gastroesophageal junction. A right-sided hemodialysis catheter is in stable position. Median sternotomy wires are again noted. There is mild stable enlargement of the cardiac silhouette. The left costophrenic angle is blunted. Degenerative changes in the thoracic spine are poorly visualized.     1. Small left-sided pleural effusion. 2. Mild stable cardiomegaly. **This report has been created using voice recognition software. It may contain minor errors which are inherent in voice recognition technology.** Electronically signed by Dr. Nicolas Hudson          Electronically

## 2024-10-31 NOTE — PROGRESS NOTES
Patient admitted to 4A Room 08 from 4K 25 for cerebell application.     Vital signs obtained. Assessment and data collection initiated. Oriented to room. Policies and procedures for 4a explained. Fall prevention and safety brochure discussed with patient.  & sitter present in the room.

## 2024-10-31 NOTE — PROGRESS NOTES
Pt taken to Stat CT with this RN and Dr Romero for evaluation for possible seizure like activity and Stat EEG ordered. Call to House Supervisor Marley for bed 4A available due to Dr. Romero wanting transfer to  for cerebell. Transfer orders in place to 4A. Pt taken to 4A08. Report given to nurse at bedside. Interpretor and sitter at bedside.

## 2024-11-01 ENCOUNTER — APPOINTMENT (OUTPATIENT)
Dept: VASCULAR LAB | Age: 54
DRG: 100 | End: 2024-11-01
Attending: PSYCHIATRY & NEUROLOGY
Payer: COMMERCIAL

## 2024-11-01 ENCOUNTER — APPOINTMENT (OUTPATIENT)
Dept: GENERAL RADIOLOGY | Age: 54
DRG: 100 | End: 2024-11-01
Attending: PSYCHIATRY & NEUROLOGY
Payer: COMMERCIAL

## 2024-11-01 ENCOUNTER — HOSPITAL ENCOUNTER (INPATIENT)
Age: 54
LOS: 5 days | Discharge: HOME | DRG: 100 | End: 2024-11-06
Attending: PSYCHIATRY & NEUROLOGY | Admitting: STUDENT IN AN ORGANIZED HEALTH CARE EDUCATION/TRAINING PROGRAM
Payer: COMMERCIAL

## 2024-11-01 VITALS
HEART RATE: 65 BPM | HEIGHT: 62 IN | DIASTOLIC BLOOD PRESSURE: 72 MMHG | TEMPERATURE: 99.5 F | SYSTOLIC BLOOD PRESSURE: 207 MMHG | WEIGHT: 180.78 LBS | OXYGEN SATURATION: 100 % | RESPIRATION RATE: 14 BRPM | BODY MASS INDEX: 33.27 KG/M2

## 2024-11-01 DIAGNOSIS — R56.9 SEIZURE (HCC): ICD-10-CM

## 2024-11-01 DIAGNOSIS — N18.6 ESRF (END STAGE RENAL FAILURE) (HCC): Primary | ICD-10-CM

## 2024-11-01 DIAGNOSIS — F41.9 ANXIETY: ICD-10-CM

## 2024-11-01 PROBLEM — D63.1 ANEMIA DUE TO CHRONIC KIDNEY DISEASE, ON CHRONIC DIALYSIS (HCC): Status: ACTIVE | Noted: 2024-09-10

## 2024-11-01 PROBLEM — F03.B0 MODERATE DEMENTIA (HCC): Status: ACTIVE | Noted: 2024-11-01

## 2024-11-01 PROBLEM — G93.40 ENCEPHALOPATHY: Status: ACTIVE | Noted: 2024-11-01

## 2024-11-01 PROBLEM — I50.32 CHRONIC HEART FAILURE WITH PRESERVED EJECTION FRACTION (HCC): Status: ACTIVE | Noted: 2024-11-01

## 2024-11-01 PROBLEM — R78.81 BACTEREMIA: Status: ACTIVE | Noted: 2024-11-01

## 2024-11-01 LAB
ALBUMIN SERPL-MCNC: 3.4 G/DL (ref 3.5–5.2)
ALBUMIN/GLOB SERPL: 1.1 {RATIO} (ref 1–2.5)
ALP SERPL-CCNC: 161 U/L (ref 35–104)
ALT SERPL-CCNC: 15 U/L (ref 10–35)
AMMONIA PLAS-SCNC: 24 UMOL/L (ref 11–51)
ANION GAP SERPL CALCULATED.3IONS-SCNC: 13 MMOL/L (ref 9–16)
AST SERPL-CCNC: 26 U/L (ref 10–35)
BACTERIA UR CULT: ABNORMAL
BACTERIA URNS QL MICRO: ABNORMAL
BASOPHILS # BLD: 0.09 K/UL (ref 0–0.2)
BASOPHILS NFR BLD: 1 % (ref 0–2)
BILIRUB SERPL-MCNC: 0.3 MG/DL (ref 0–1.2)
BILIRUB UR QL STRIP: NEGATIVE
BUN SERPL-MCNC: 21 MG/DL (ref 6–20)
CA-I BLD-SCNC: 1.07 MMOL/L (ref 1.13–1.33)
CALCIUM SERPL-MCNC: 8.4 MG/DL (ref 8.6–10.4)
CASTS #/AREA URNS LPF: ABNORMAL /LPF (ref 0–8)
CHLORIDE SERPL-SCNC: 102 MMOL/L (ref 98–107)
CLARITY UR: ABNORMAL
CO2 SERPL-SCNC: 25 MMOL/L (ref 20–31)
COLOR UR: YELLOW
CREAT SERPL-MCNC: 3.4 MG/DL (ref 0.6–0.9)
EOSINOPHIL # BLD: 0.47 K/UL (ref 0–0.44)
EOSINOPHILS RELATIVE PERCENT: 5 % (ref 1–4)
EPI CELLS #/AREA URNS HPF: ABNORMAL /HPF (ref 0–5)
ERYTHROCYTE [DISTWIDTH] IN BLOOD BY AUTOMATED COUNT: 15.6 % (ref 11.8–14.4)
EST. AVERAGE GLUCOSE BLD GHB EST-MCNC: 146 MG/DL
GFR, ESTIMATED: 16 ML/MIN/1.73M2
GLUCOSE BLD-MCNC: 141 MG/DL (ref 65–105)
GLUCOSE BLD-MCNC: 143 MG/DL (ref 65–105)
GLUCOSE BLD-MCNC: 162 MG/DL (ref 65–105)
GLUCOSE BLD-MCNC: 77 MG/DL (ref 65–105)
GLUCOSE BLD-MCNC: 90 MG/DL (ref 65–105)
GLUCOSE SERPL-MCNC: 93 MG/DL (ref 74–99)
GLUCOSE UR STRIP-MCNC: NEGATIVE MG/DL
HBA1C MFR BLD: 6.7 % (ref 4–6)
HCT VFR BLD AUTO: 31.6 % (ref 36.3–47.1)
HGB BLD-MCNC: 9.6 G/DL (ref 11.9–15.1)
HGB UR QL STRIP.AUTO: NEGATIVE
HIV 1+2 AB+HIV1 P24 AG SERPL QL IA: NONREACTIVE
IMM GRANULOCYTES # BLD AUTO: 0.05 K/UL (ref 0–0.3)
IMM GRANULOCYTES NFR BLD: 1 %
KETONES UR STRIP-MCNC: NEGATIVE MG/DL
LEUKOCYTE ESTERASE UR QL STRIP: ABNORMAL
LYMPHOCYTES NFR BLD: 3.07 K/UL (ref 1.1–3.7)
LYMPHOCYTES RELATIVE PERCENT: 35 % (ref 24–43)
MCH RBC QN AUTO: 26.3 PG (ref 25.2–33.5)
MCHC RBC AUTO-ENTMCNC: 30.4 G/DL (ref 28.4–34.8)
MCV RBC AUTO: 86.6 FL (ref 82.6–102.9)
MONOCYTES NFR BLD: 1.19 K/UL (ref 0.1–1.2)
MONOCYTES NFR BLD: 13 % (ref 3–12)
NEUTROPHILS NFR BLD: 45 % (ref 36–65)
NEUTS SEG NFR BLD: 4.03 K/UL (ref 1.5–8.1)
NITRITE UR QL STRIP: NEGATIVE
NRBC BLD-RTO: 0 PER 100 WBC
ORGANISM: ABNORMAL
PH UR STRIP: 8.5 [PH] (ref 5–8)
PHOSPHATE SERPL-MCNC: 3.8 MG/DL (ref 2.5–4.5)
PLATELET # BLD AUTO: 375 K/UL (ref 138–453)
PMV BLD AUTO: 10 FL (ref 8.1–13.5)
POTASSIUM SERPL-SCNC: 4 MMOL/L (ref 3.7–5.3)
PROT SERPL-MCNC: 6.5 G/DL (ref 6.6–8.7)
PROT UR STRIP-MCNC: ABNORMAL MG/DL
PTH-INTACT SERPL-MCNC: 259 PG/ML (ref 15–65)
RBC # BLD AUTO: 3.65 M/UL (ref 3.95–5.11)
RBC # BLD: ABNORMAL 10*6/UL
RBC #/AREA URNS HPF: ABNORMAL /HPF (ref 0–4)
SODIUM SERPL-SCNC: 140 MMOL/L (ref 136–145)
SP GR UR STRIP: 1.01 (ref 1–1.03)
T PALLIDUM AB SER QL IA: NONREACTIVE
TROPONIN I SERPL HS-MCNC: 200 NG/L (ref 0–14)
TSH SERPL DL<=0.05 MIU/L-ACNC: 1.63 UIU/ML (ref 0.27–4.2)
UROBILINOGEN UR STRIP-ACNC: NORMAL EU/DL (ref 0–1)
WBC #/AREA URNS HPF: ABNORMAL /HPF (ref 0–5)
WBC OTHER # BLD: 8.9 K/UL (ref 3.5–11.3)

## 2024-11-01 PROCEDURE — 95700 EEG CONT REC W/VID EEG TECH: CPT

## 2024-11-01 PROCEDURE — 6360000002 HC RX W HCPCS

## 2024-11-01 PROCEDURE — 5A1D70Z PERFORMANCE OF URINARY FILTRATION, INTERMITTENT, LESS THAN 6 HOURS PER DAY: ICD-10-PCS | Performed by: FAMILY MEDICINE

## 2024-11-01 PROCEDURE — 95711 VEEG 2-12 HR UNMONITORED: CPT

## 2024-11-01 PROCEDURE — 36415 COLL VENOUS BLD VENIPUNCTURE: CPT

## 2024-11-01 PROCEDURE — 83970 ASSAY OF PARATHORMONE: CPT

## 2024-11-01 PROCEDURE — 81001 URINALYSIS AUTO W/SCOPE: CPT

## 2024-11-01 PROCEDURE — C9254 INJECTION, LACOSAMIDE: HCPCS

## 2024-11-01 PROCEDURE — 84100 ASSAY OF PHOSPHORUS: CPT

## 2024-11-01 PROCEDURE — 95720 EEG PHY/QHP EA INCR W/VEEG: CPT | Performed by: PSYCHIATRY & NEUROLOGY

## 2024-11-01 PROCEDURE — 6360000002 HC RX W HCPCS: Performed by: REGISTERED NURSE

## 2024-11-01 PROCEDURE — 92523 SPEECH SOUND LANG COMPREHEN: CPT

## 2024-11-01 PROCEDURE — 6370000000 HC RX 637 (ALT 250 FOR IP)

## 2024-11-01 PROCEDURE — 86738 MYCOPLASMA ANTIBODY: CPT

## 2024-11-01 PROCEDURE — 2580000003 HC RX 258

## 2024-11-01 PROCEDURE — 86778 TOXOPLASMA ANTIBODY IGM: CPT

## 2024-11-01 PROCEDURE — 84443 ASSAY THYROID STIM HORMONE: CPT

## 2024-11-01 PROCEDURE — 6360000002 HC RX W HCPCS: Performed by: STUDENT IN AN ORGANIZED HEALTH CARE EDUCATION/TRAINING PROGRAM

## 2024-11-01 PROCEDURE — 99222 1ST HOSP IP/OBS MODERATE 55: CPT | Performed by: INTERNAL MEDICINE

## 2024-11-01 PROCEDURE — 83036 HEMOGLOBIN GLYCOSYLATED A1C: CPT

## 2024-11-01 PROCEDURE — 85025 COMPLETE CBC W/AUTO DIFF WBC: CPT

## 2024-11-01 PROCEDURE — 87327 CRYPTOCOCCUS NEOFORM AG IA: CPT

## 2024-11-01 PROCEDURE — 71045 X-RAY EXAM CHEST 1 VIEW: CPT

## 2024-11-01 PROCEDURE — 80053 COMPREHEN METABOLIC PANEL: CPT

## 2024-11-01 PROCEDURE — 87389 HIV-1 AG W/HIV-1&-2 AB AG IA: CPT

## 2024-11-01 PROCEDURE — 86780 TREPONEMA PALLIDUM: CPT

## 2024-11-01 PROCEDURE — 93005 ELECTROCARDIOGRAM TRACING: CPT | Performed by: PSYCHIATRY & NEUROLOGY

## 2024-11-01 PROCEDURE — 87040 BLOOD CULTURE FOR BACTERIA: CPT

## 2024-11-01 PROCEDURE — 2060000000 HC ICU INTERMEDIATE R&B

## 2024-11-01 PROCEDURE — 86777 TOXOPLASMA ANTIBODY: CPT

## 2024-11-01 PROCEDURE — 82330 ASSAY OF CALCIUM: CPT

## 2024-11-01 PROCEDURE — 82947 ASSAY GLUCOSE BLOOD QUANT: CPT

## 2024-11-01 PROCEDURE — 84484 ASSAY OF TROPONIN QUANT: CPT

## 2024-11-01 PROCEDURE — 99223 1ST HOSP IP/OBS HIGH 75: CPT | Performed by: PSYCHIATRY & NEUROLOGY

## 2024-11-01 PROCEDURE — 2580000003 HC RX 258: Performed by: REGISTERED NURSE

## 2024-11-01 PROCEDURE — 93970 EXTREMITY STUDY: CPT

## 2024-11-01 PROCEDURE — 87086 URINE CULTURE/COLONY COUNT: CPT

## 2024-11-01 PROCEDURE — 90935 HEMODIALYSIS ONE EVALUATION: CPT

## 2024-11-01 PROCEDURE — 99254 IP/OBS CNSLTJ NEW/EST MOD 60: CPT | Performed by: SURGERY

## 2024-11-01 PROCEDURE — 82140 ASSAY OF AMMONIA: CPT

## 2024-11-01 PROCEDURE — 99222 1ST HOSP IP/OBS MODERATE 55: CPT | Performed by: STUDENT IN AN ORGANIZED HEALTH CARE EDUCATION/TRAINING PROGRAM

## 2024-11-01 RX ORDER — SODIUM CHLORIDE 9 MG/ML
INJECTION, SOLUTION INTRAVENOUS PRN
Status: DISCONTINUED | OUTPATIENT
Start: 2024-11-01 | End: 2024-11-06 | Stop reason: HOSPADM

## 2024-11-01 RX ORDER — HALOPERIDOL 5 MG/ML
5 INJECTION INTRAMUSCULAR ONCE
Status: COMPLETED | OUTPATIENT
Start: 2024-11-01 | End: 2024-11-01

## 2024-11-01 RX ORDER — CARVEDILOL 25 MG/1
25 TABLET ORAL 2 TIMES DAILY WITH MEALS
Status: DISCONTINUED | OUTPATIENT
Start: 2024-11-01 | End: 2024-11-06 | Stop reason: HOSPADM

## 2024-11-01 RX ORDER — FOLIC ACID 1 MG/1
1 TABLET ORAL DAILY
Status: DISCONTINUED | OUTPATIENT
Start: 2024-11-01 | End: 2024-11-06 | Stop reason: HOSPADM

## 2024-11-01 RX ORDER — LACOSAMIDE 10 MG/ML
200 INJECTION, SOLUTION INTRAVENOUS 2 TIMES DAILY
Status: DISCONTINUED | OUTPATIENT
Start: 2024-11-01 | End: 2024-11-01

## 2024-11-01 RX ORDER — HEPARIN SODIUM 1000 [USP'U]/ML
1600 INJECTION, SOLUTION INTRAVENOUS; SUBCUTANEOUS PRN
Status: DISCONTINUED | OUTPATIENT
Start: 2024-11-01 | End: 2024-11-06 | Stop reason: HOSPADM

## 2024-11-01 RX ORDER — HALOPERIDOL 5 MG/ML
INJECTION INTRAMUSCULAR
Status: COMPLETED
Start: 2024-11-01 | End: 2024-11-01

## 2024-11-01 RX ORDER — ACETAMINOPHEN 325 MG/1
650 TABLET ORAL EVERY 6 HOURS PRN
Status: DISCONTINUED | OUTPATIENT
Start: 2024-11-01 | End: 2024-11-06 | Stop reason: HOSPADM

## 2024-11-01 RX ORDER — LEVETIRACETAM 10 MG/ML
1000 INJECTION INTRAVASCULAR DAILY
Status: DISCONTINUED | OUTPATIENT
Start: 2024-11-01 | End: 2024-11-01

## 2024-11-01 RX ORDER — POLYETHYLENE GLYCOL 3350 17 G/17G
17 POWDER, FOR SOLUTION ORAL DAILY PRN
Status: DISCONTINUED | OUTPATIENT
Start: 2024-11-01 | End: 2024-11-06 | Stop reason: HOSPADM

## 2024-11-01 RX ORDER — LEVETIRACETAM 5 MG/ML
500 INJECTION INTRAVASCULAR EVERY OTHER DAY
Status: DISCONTINUED | OUTPATIENT
Start: 2024-11-02 | End: 2024-11-01

## 2024-11-01 RX ORDER — ACETAMINOPHEN 650 MG/1
650 SUPPOSITORY RECTAL EVERY 6 HOURS PRN
Status: DISCONTINUED | OUTPATIENT
Start: 2024-11-01 | End: 2024-11-06 | Stop reason: HOSPADM

## 2024-11-01 RX ORDER — LACOSAMIDE 100 MG/1
200 TABLET ORAL 2 TIMES DAILY
Status: DISCONTINUED | OUTPATIENT
Start: 2024-11-01 | End: 2024-11-06 | Stop reason: HOSPADM

## 2024-11-01 RX ORDER — ATORVASTATIN CALCIUM 40 MG/1
40 TABLET, FILM COATED ORAL DAILY
Status: DISCONTINUED | OUTPATIENT
Start: 2024-11-01 | End: 2024-11-06 | Stop reason: HOSPADM

## 2024-11-01 RX ORDER — CLOPIDOGREL BISULFATE 75 MG/1
75 TABLET ORAL DAILY
Status: DISCONTINUED | OUTPATIENT
Start: 2024-11-01 | End: 2024-11-01

## 2024-11-01 RX ORDER — GLUCAGON 1 MG/ML
1 KIT INJECTION PRN
Status: DISCONTINUED | OUTPATIENT
Start: 2024-11-01 | End: 2024-11-06 | Stop reason: HOSPADM

## 2024-11-01 RX ORDER — LEVETIRACETAM 500 MG/1
1000 TABLET ORAL DAILY
Status: DISCONTINUED | OUTPATIENT
Start: 2024-11-02 | End: 2024-11-06 | Stop reason: HOSPADM

## 2024-11-01 RX ORDER — INSULIN GLARGINE 100 [IU]/ML
5 INJECTION, SOLUTION SUBCUTANEOUS NIGHTLY
Status: DISCONTINUED | OUTPATIENT
Start: 2024-11-01 | End: 2024-11-01

## 2024-11-01 RX ORDER — LACOSAMIDE 100 MG/1
200 TABLET ORAL 2 TIMES DAILY
Status: DISCONTINUED | OUTPATIENT
Start: 2024-11-01 | End: 2024-11-01

## 2024-11-01 RX ORDER — SODIUM CHLORIDE 0.9 % (FLUSH) 0.9 %
5-40 SYRINGE (ML) INJECTION PRN
Status: DISCONTINUED | OUTPATIENT
Start: 2024-11-01 | End: 2024-11-06 | Stop reason: HOSPADM

## 2024-11-01 RX ORDER — DEXTROSE MONOHYDRATE 100 MG/ML
INJECTION, SOLUTION INTRAVENOUS CONTINUOUS PRN
Status: DISCONTINUED | OUTPATIENT
Start: 2024-11-01 | End: 2024-11-06 | Stop reason: HOSPADM

## 2024-11-01 RX ORDER — BUMETANIDE 1 MG/1
1 TABLET ORAL DAILY
Status: DISCONTINUED | OUTPATIENT
Start: 2024-11-01 | End: 2024-11-06 | Stop reason: HOSPADM

## 2024-11-01 RX ORDER — HYDRALAZINE HYDROCHLORIDE 50 MG/1
25 TABLET, FILM COATED ORAL 3 TIMES DAILY
Status: DISCONTINUED | OUTPATIENT
Start: 2024-11-01 | End: 2024-11-02

## 2024-11-01 RX ORDER — ONDANSETRON 2 MG/ML
4 INJECTION INTRAMUSCULAR; INTRAVENOUS EVERY 6 HOURS PRN
Status: DISCONTINUED | OUTPATIENT
Start: 2024-11-01 | End: 2024-11-06 | Stop reason: HOSPADM

## 2024-11-01 RX ORDER — ONDANSETRON 4 MG/1
4 TABLET, ORALLY DISINTEGRATING ORAL EVERY 8 HOURS PRN
Status: DISCONTINUED | OUTPATIENT
Start: 2024-11-01 | End: 2024-11-06 | Stop reason: HOSPADM

## 2024-11-01 RX ORDER — MAGNESIUM SULFATE IN WATER 40 MG/ML
2000 INJECTION, SOLUTION INTRAVENOUS PRN
Status: DISCONTINUED | OUTPATIENT
Start: 2024-11-01 | End: 2024-11-04

## 2024-11-01 RX ORDER — LEVETIRACETAM 500 MG/1
500 TABLET ORAL
Status: DISCONTINUED | OUTPATIENT
Start: 2024-11-01 | End: 2024-11-06 | Stop reason: HOSPADM

## 2024-11-01 RX ORDER — INSULIN LISPRO 100 [IU]/ML
0-8 INJECTION, SOLUTION INTRAVENOUS; SUBCUTANEOUS
Status: DISCONTINUED | OUTPATIENT
Start: 2024-11-01 | End: 2024-11-06 | Stop reason: HOSPADM

## 2024-11-01 RX ORDER — SODIUM CHLORIDE 0.9 % (FLUSH) 0.9 %
5-40 SYRINGE (ML) INJECTION EVERY 12 HOURS SCHEDULED
Status: DISCONTINUED | OUTPATIENT
Start: 2024-11-01 | End: 2024-11-06 | Stop reason: HOSPADM

## 2024-11-01 RX ORDER — ASPIRIN 81 MG/1
81 TABLET ORAL DAILY
Status: DISCONTINUED | OUTPATIENT
Start: 2024-11-01 | End: 2024-11-06 | Stop reason: HOSPADM

## 2024-11-01 RX ADMIN — LACOSAMIDE 200 MG: 100 TABLET, FILM COATED ORAL at 20:32

## 2024-11-01 RX ADMIN — FOLIC ACID 1 MG: 1 TABLET ORAL at 08:08

## 2024-11-01 RX ADMIN — LACOSAMIDE 200 MG: 10 INJECTION INTRAVENOUS at 08:14

## 2024-11-01 RX ADMIN — HALOPERIDOL LACTATE 5 MG: 5 INJECTION, SOLUTION INTRAMUSCULAR at 14:34

## 2024-11-01 RX ADMIN — HALOPERIDOL 5 MG: 5 INJECTION INTRAMUSCULAR at 14:34

## 2024-11-01 RX ADMIN — HEPARIN SODIUM 1600 UNITS: 1000 INJECTION INTRAVENOUS; SUBCUTANEOUS at 18:14

## 2024-11-01 RX ADMIN — BUMETANIDE 1 MG: 1 TABLET ORAL at 08:08

## 2024-11-01 RX ADMIN — CARVEDILOL 25 MG: 25 TABLET, FILM COATED ORAL at 08:43

## 2024-11-01 RX ADMIN — SODIUM CHLORIDE, PRESERVATIVE FREE 10 ML: 5 INJECTION INTRAVENOUS at 08:40

## 2024-11-01 RX ADMIN — APIXABAN 5 MG: 5 TABLET, FILM COATED ORAL at 08:43

## 2024-11-01 RX ADMIN — LEVETIRACETAM 1000 MG: 10 INJECTION, SOLUTION INTRAVENOUS at 08:33

## 2024-11-01 RX ADMIN — HEPARIN SODIUM 1600 UNITS: 1000 INJECTION INTRAVENOUS; SUBCUTANEOUS at 18:15

## 2024-11-01 RX ADMIN — LEVETIRACETAM 500 MG: 500 TABLET, FILM COATED ORAL at 20:31

## 2024-11-01 RX ADMIN — ASPIRIN 81 MG: 81 TABLET, COATED ORAL at 08:08

## 2024-11-01 RX ADMIN — SODIUM CHLORIDE 1500 MG: 9 INJECTION, SOLUTION INTRAVENOUS at 08:39

## 2024-11-01 RX ADMIN — PIPERACILLIN SODIUM AND TAZOBACTAM SODIUM 3375 MG: 3; .375 INJECTION, SOLUTION INTRAVENOUS at 20:30

## 2024-11-01 RX ADMIN — HYDRALAZINE HYDROCHLORIDE 25 MG: 50 TABLET ORAL at 08:08

## 2024-11-01 RX ADMIN — SODIUM CHLORIDE, PRESERVATIVE FREE 10 ML: 5 INJECTION INTRAVENOUS at 21:49

## 2024-11-01 RX ADMIN — ATORVASTATIN CALCIUM 40 MG: 10 TABLET, FILM COATED ORAL at 20:32

## 2024-11-01 RX ADMIN — LACOSAMIDE 200 MG: 10 INJECTION INTRAVENOUS at 02:25

## 2024-11-01 RX ADMIN — SODIUM CHLORIDE, PRESERVATIVE FREE 40 MG: 5 INJECTION INTRAVENOUS at 08:13

## 2024-11-01 RX ADMIN — APIXABAN 5 MG: 5 TABLET, FILM COATED ORAL at 20:32

## 2024-11-01 RX ADMIN — PIPERACILLIN SODIUM AND TAZOBACTAM SODIUM 3375 MG: 3; .375 INJECTION, SOLUTION INTRAVENOUS at 09:36

## 2024-11-01 ASSESSMENT — PAIN SCALES - GENERAL
PAINLEVEL_OUTOF10: 0
PAINLEVEL_OUTOF10: 0

## 2024-11-01 NOTE — H&P
Neuro ICU History & Physical    Patient Name: Shahrzad Giordano  Patient : 1970  Room/Bed: 0125/0125-01  Code Status: Full   Allergies: No Known Allergies    CHIEF COMPLAINT     New onset seizure concern for status epilepticus     HPI    History Obtained From: Patient and son with bedside nurse utilized for   and EMR    The patient is a 54 y.o. female who was brought to the [facility] 8 weeks ago from [place] secondary to her having dementia type symptoms and having no family in that area.  Patient does have significant past medical history for ESRD on hemodialysis,(per son patient has had the same dialysis catheter in her right upper chest for approximately 1 year) HFrEF 30%, atrial fibrillation, normocytic anemia, non-insulin-dependent type 2 diabetes, chronic left ICA stenosis with several infarcts, hypertension, and hyperlipidemia.  Since arriving to [place] patient has not established medical care with a PCP or nephrologist and has been going to [facility] ED for dialysis sessions as needed.  Her most recent admission was 10/23/2024 is reported that 2 hours into her dialysis she had a witnessed seizure with altered mental status at that time a rapid response was called was later converted to a CODE BLUE though the patient did not lose her pulse or have any arrhythmias.  Patient did require intubation for respiratory protection at that time and was transferred to the ICU.  Patient was placed on EEG and 10/24 separate evidence of 2 more episodes of seizures as well as severe encephalopathy patient was placed on a propofol drip was later extubated on 10/25.  Neurology was reconsulted on 10/31 due to repeat seizures patient was intermittently refusing to take her medications.  Patient had episode of fluttering of her eyelids and change in her mental status stat CT was performed which showed no acute abnormality she was given 2 mg IV Ativan along with 200 IV Vimpat and  reviewed with:    [] Mignon Hargrove MD  [x] Chris Lizarraga MD  [] Doug Perez MD  --[] there are no new interval images to review.     ASSESSMENT AND PLAN:         ASSESSMENT:     This is a 54 y.o. female who was brought to [place] from [place] approximately 8 weeks ago by her son who lives appear secondary to her having dementia type symptoms.  Since being here patient has been hospitalized 4 times in the last month for metabolic encephalopathy need for dialysis, now with concern for new onset seizure patient was transferred to [facility] for closer neuromonitoring    Patient care will be discussed with attending, will reevaluate patient along with attending.     PLAN/MEDICAL DECISION MAKING:      NEUROLOGIC:  New onset seizure   Encephalomalacia   Hx multiple CVA/Left ICA stenosis   Metabolic encephalopathy   - LTME  - Keppra 1 g daily   - Keppra 500mg qod to be administered with dialysis   - Vimpat 200mg BID   - Goal SBP <160mmHg  - ASA 81 mg   - Lipitor 40 mg   - Obtain ammonia   - Neuro checks per protocol    CARDIOVASCULAR:  Hypertension   Paroxysmal atrial fibrillation   CAD s/p CABG  Chronic combined systolic and diastolic HF   Elevated troponin   -Last LATRICE 9/11/24 EF 30%  - hydralazine 25 mg TID   - Bumex 1 mg daily   - Goal SBP 160mmHg   - Continue telemetry  - Continue to hold Eliquis and Plavix in the setting of recent GI bleed    PULMONARY:  - Maintain Spo2 >92%    RENAL/FLUID/ELECTROLYTE:  ESRD on HD  Acute urinary retention   - Last HD 10/30/2024  - Consult Nephrology   - BUN 17/ Creatinine 3.1  - Urine output 30 ml/kg/hr  - place waldron catheter   - urinalysis   - Replace electrolytes PRN  - Daily BMP    GI/NUTRITION:  Recent GI Bleed 10/20/24  Positive occult stool 10/19/24  NUTRITION:  Regular diet   - Bowel regimen: Glycolax prn   - GI prophylaxis: Protonix     ID:  Positive blood culture x 1 10/19  Positive urine culture 10/19 (staph)   - Repeat urine culture   - Blood culture x 2   -

## 2024-11-01 NOTE — CONSULTS
to [facility] ED for intermittent dialysis sessions.    The patient has a history of presenting to the hospital because of AMS, receiving dialysis treatments and then leaving [place].    She has been treated with Rocephin and Levaquin in the past because of suspicion for pneumonia     She recently was admitted at [facility] on 10/23 where she had a witnessed seizure and developed altered mental status 2 hours into her dialysis session. The patient required intubation for respiratory protection and was transferred to the ICU. The patient had 2 repeat episodes of seizures recorded by EEG on 10/24.    The patient was extubated on 10/25. She had another change in mental status and fluttering of her eyelids on 10/31 during occupational therapy. CT imaging was unremarkable. Neurology was re-consulted and they recommended a higher level of care and for the patient to be transferred to [place].    The patient can still produce urine but a straight cath was used multiple times at [facility] because of urinary retention. Her last dialysis was 10/30.    The patient has a tunneled catheter for HD in place for over a year    The patient has not had any seizure-like activity since admission to [facility].    Urine culture on 10/31 with skin jose, likely contamination  UA on 11/1 revealed many bacteria.    Repeat blood and urine cultures have been ordered.    Because of the patient's continued altered mental status and concern for UTI and prior single bacteremia with Coagulase negative Staphylococci , ID was consulted.      CURRENT EVALUATION- DAILY INTERVAL CHANGES 11/1/2024  BP (!) 135/55   Pulse 63   Temp 98.8 °F (37.1 °C)   Resp 17   Wt 74.8 kg (164 lb 14.5 oz)   SpO2 (!) 88%   BMI 30.16 kg/m²     Afebrile  Patient has been hypertensive. Most recently 177/61   VS stable otherwise.    The patient was in soft restraints after exhibiting previous aggressive behavior where she tried to lick and punch the       Current Antibiotics: not stated    MRSA by PCR [[id number]] Collected: 10/23/24 1945    Order Status: Completed Specimen: Nares Updated: 10/24/24 1005     MRSA SCREEN RT-PCR NEGATIVE     Comment: No MRSA detected by Real Time - Polymerase Chain Reaction.  Specimen Source:  Nares  Performed at [facility] [address] [place] [address]         Blood ID, Molecular [[id number]]  (Abnormal) Collected: 10/20/24 2034    Order Status: Completed Specimen: Blood Updated: 10/21/24 0533     Acinetobacter calcoaceticus-baumannii complex Not Detected     Bacteroides fragilis Not Detected     Candida albicans Not Detected     Candida auris Not Detected     Candida glabrata Not Detected     Candida krusei Not Detected     Candida parapsilosis Not Detected     Candida tropicalis Not Detected     Cryptococcus neoformans/gattii Not Detected     CTX-M N/A     Enterobacter Cloacae Complex Not Detected     Enterobacterales Not Detected     Enterococcus faecalis Not Detected     Enterococcus faecium Not Detected     Escherichia Coli Not Detected     Haemophilus influenzae Not Detected     IMP N/A     Klebsiella aerogenes Not Detected     Klebsiella oxytoca Not Detected     Klebsiella pneumoniae group Not Detected     KPC (Carbapenem resistance gene) N/A     Listeria monocytogenes Not Detected     MCR-1 N/A     mecA/C Detected     mecA/C and MREJ (MRSA) N/A     NDM N/A     Neisseria Meningitidis, NMNI Not Detected     [id number] N/A     Proteus spp Not Detected     Pseudomonas aeruginosa Not Detected     Salmonella species Not Detected     SERRATIA MARCESCENS Not Detected     Staphylococcus Aureus Not Detected     Staphylococcus epidermidis Detected     Staphylococcus lugdunensis Not Detected     Staphylococcus spp Detected     Stenotrophomonas maltophilia Not Detected     Streptococcus agalactiae (Group B) Not Detected     Streptococcus pyogenes (Group A) Not Detected     Streptococcus spp Not Detected     Streptococcus

## 2024-11-01 NOTE — PROCEDURES
LONG-TERM EEG-VIDEO MONITORING   CLINICAL NEUROPHYSIOLOGY LABORATORY  DEPARTMENT OF NEUROLOGY  [facility]    Patient: Shahrzad Giordano  Age: 54 y.o.  MRN: [id number]    Referring Physician: Sheri Romero MD  History: The patient is a 54 y.o. female who presented breakthrough seizure/encephalopathy. This long-term video-EEG monitoring study was performed to determine the nature of the patient's clinical events. The patient is on neuroactive medications.   Shahrzad Giordano   Current Facility-Administered Medications   Medication Dose Route Frequency Provider Last Rate Last Admin    aspirin EC tablet 81 mg  81 mg Oral Daily Kieran Malcolm APRN - CNP   81 mg at 11/01/24 0808    atorvastatin (LIPITOR) tablet 40 mg  40 mg Oral Daily Kieran Malcolm APRN - CNP        bumetanide (BUMEX) tablet 1 mg  1 mg Oral Daily Kieran Malcolm APRN - CNP   1 mg at 11/01/24 0808    folic acid (FOLVITE) tablet 1 mg  1 mg Oral Daily Kieran Malcolm APRN - CNP   1 mg at 11/01/24 0808    hydrALAZINE (APRESOLINE) tablet 25 mg  25 mg Oral TID Kieran Malcolm APRN - CNP   25 mg at 11/01/24 0808    sertraline (ZOLOFT) tablet 50 mg  50 mg Oral QPM Kieran Malcolm APRN - CNP        sodium chloride flush 0.9 % injection 5-40 mL  5-40 mL IntraVENous 2 times per day Kieran Malcolm APRN - CNP   10 mL at 11/01/24 0840    sodium chloride flush 0.9 % injection 5-40 mL  5-40 mL IntraVENous PRN Kieran Malcolm APRN - CNP        0.9 % sodium chloride infusion   IntraVENous PRN Kieran Malcolm APRN - CNP        magnesium sulfate 2000 mg in 50 mL IVPB premix  2,000 mg IntraVENous PRN Kieran Malcolm APRN - CNP        ondansetron (ZOFRAN-ODT) disintegrating tablet 4 mg  4 mg Oral Q8H PRN Kieran Malcolm APRN - CNP        Or    ondansetron (ZOFRAN) injection 4 mg  4 mg IntraVENous Q6H PRN Kieran Malcolm APRN - CNP        polyethylene glycol (GLYCOLAX) packet 17 g  17 g Oral Daily PRN Kieran Malcolm, MARCIO - RICHIE        acetaminophen (TYLENOL)  tablet 650 mg  650 mg Oral Q6H PRN Kieran Malcolm APRN - CNP        Or    acetaminophen (TYLENOL) suppository 650 mg  650 mg Rectal Q6H PRN Kieran Malcolm APRN - CNP        glucose chewable tablet 16 g  4 tablet Oral PRN Kieran Malcolm APRN - CNP        dextrose bolus 10% 125 mL  125 mL IntraVENous PRN Kieran Malcolm APRN - CNP        Or    dextrose bolus 10% 250 mL  250 mL IntraVENous PRN Kieran Malcolm APRN - CNP        glucagon injection 1 mg  1 mg SubCUTAneous PRN Kieran Malcolm APRN - CNP        dextrose 10 % infusion   IntraVENous Continuous PRN Kieran Malcolm APRN - CNP        insulin lispro (HUMALOG,ADMELOG) injection vial 0-8 Units  0-8 Units SubCUTAneous 4x Daily AC & HS Kieran Malcolm APRN - CNP        vancomycin (VANCOCIN) intermittent dosing (placeholder)   Other RX Placeholder Phani Zelaya MD        piperacillin-tazobactam (ZOSYN) 3375 mg in dextrose 50 mL IVPB (premix)  3,375 mg IntraVENous Q12H Phani Zelaya MD   Stopped at 11/01/24 1336    carvedilol (COREG) tablet 25 mg  25 mg Oral BID  Kieran Malcolm APRN - CNP   25 mg at 11/01/24 0843    apixaban (ELIQUIS) tablet 5 mg  5 mg Oral BID Kieran Malcolm APRN - CNP   5 mg at 11/01/24 0843    levETIRAcetam (KEPPRA) tablet 500 mg  500 mg Oral Once per day on Monday Wednesday Friday Kieran Malcolm APRN - CNP        [START ON 11/2/2024] levETIRAcetam (KEPPRA) tablet 1,000 mg  1,000 mg Oral Daily Kieran Malcolm APRN - CNP        lacosamide (VIMPAT) tablet 200 mg  200 mg Oral BID Kieran Malcolm APRN - CNP        heparin (porcine) injection 1,600 Units  1,600 Units IntraCATHeter PRN Joselyn Neumann MD   1,600 Units at 11/01/24 1815    heparin (porcine) injection 1,600 Units  1,600 Units IntraCATHeter PRN Joselyn Neumann MD   1,600 Units at 11/01/24 5786     Technical Description: This is a 21-channel digital EEG recording with time-locked video. Electrodes were placed in accordance with the 10-20 International System of Electrode Placement. Single lead EKG

## 2024-11-01 NOTE — PROGRESS NOTES
the neuro ICU for close neuromonitoring       Pain:  Pain Assessment  Pain Assessment: None - Denies Pain    Vision/ Hearing  Vision  Vision: Within Functional Limits  Hearing  Hearing: Within functional limits    Assessment:    Pt presents with moderate to severe receptive-expressive aphasia characterized by impaired ability to follow 1-2 step commands, body part identification, orientation, sentence completion, object identification, and answering yes/no questions. Pt with perseverations and jargon across evaluation.   utilized as pts first language is Sinhala, no family at bedside. Further speech/language/cognitive assessment recommended as pt was unable to complete in its entirety due to fatigue. ST to follow up and provide treatment to address noted deficits.  Education provided.        Recommendations:  Recommendations  Requires SLP Intervention: Yes  Patient Education: Yes  Patient Education Response: No evidence of learning   3-5x/week  D/C Recommendations: Ongoing speech therapy is recommended during this hospitalization;Ongoing speech therapy is recommended at next level of care       Plan:   Speech Therapy Prognosis  Prognosis Considerations: Severity of Impairments;Participation Level;Age  Individuals consulted  Consulted and agree with results and recommendations: Patient;RN  RN Name: Ne    Goals:  Short Term Goals  Goal 1: Pt will recall orientation with 90% accuracy  Goal 2: Pt will follow 1-3 step commands with 90% accuracy  Goal 3: Pt will complete naming tasks (confrontation, responsive, divergent) with 90% accuracy  Goal 4: Pt will answer (bio, simple , complex) yes/no questions with 90% accuracy  Goal 5: Pt will complete further speech/language/cognitive testing as appropriate   Patient/family involved in developing goals and treatment plan: yes    Subjective:     Social/Functional History  Occupation: Retired  Vision  Vision: Within Functional Limits  Hearing  Hearing: Within  functional limits           Objective:  Oral Motor   Labial: No impairment  Dentition: Natural  Oral Hygiene: Clean;Moist  Lingual: No impairment    Motor Speech  Overall Impairment Severity: None    Auditory Comprehension  Comprehension: Exceptions  One Step Commands: Moderate (2/3 given max repetition)  Two Step Commands: Severe (0/3 despite max repetition)  Common Objects: Severe (0/3)  Other (Comment): Moderate (Body Part Identifiction: 2/4 given max repetition)    Reading Comprehension  Reading Status: Unable to assess    Expression  Primary Mode of Expression: Verbal (Language: Surinamese)    Verbal Expression  Verbal Expression: Exceptions to functional limits  Initiation: Moderate  Repetition:  (TERRY)  Automatic Speech: Moderate (1/2 requiring max cues (able to count 1-10, unable to state all KEMI))  Confrontation:  (TERRY)  Convergent:  (TERRY)  Divergent: Moderate (Completing sentences: 0/1- will need further assessed)  Responsive:  (TERRY)  Interfering Components: Impaired thought organization;Perseverations;Limited error awareness;Jargon  Effective Techniques: Provide extra time    Written Expression  Written Expression: Unable to assess         Pragmatics/Social Functioning  Pragmatics: Unable to assess    Cognition:      Orientation  Overall Orientation Status: Impaired  Orientation Level: Oriented to person;Disoriented to time;Disoriented to situation    Prognosis:  Speech Therapy Prognosis  Prognosis Considerations: Severity of Impairments;Participation Level;Age  Individuals consulted  Consulted and agree with results and recommendations: Patient;RN  RN Name: Ne    Education:  Patient Education: Yes  Patient Education Response: No evidence of learning          Therapy Time:   Individual Concurrent Group Co-treatment   Time In  0855         Time Out  0917         Minutes  22                 Electronically signed by THALIA Green on 11/1/2024 at 9:32 AM

## 2024-11-01 NOTE — PROGRESS NOTES
[facility]   Pharmacy Pharmacokinetic Monitoring Service - Vancomycin    Shahrzad Giordano is a 54 y.o. female starting on vancomycin therapy for sepsis. Pharmacy consulted by Dr. Phani Zelaya for monitoring and adjustment.    Target Concentration: Pre-Dialysis Concentration 15-20 mg/L  Additional Antimicrobials: zosyn    Pertinent Laboratory Values:   Wt Readings from Last 1 Encounters:   11/01/24 74.8 kg (164 lb 14.5 oz)     Temp Readings from Last 1 Encounters:   11/01/24 98.6 °F (37 °C)     Recent Labs     10/31/24  1620 11/01/24  0252   CREATININE 3.1* 3.4*   BUN 17 21*   WBC 6.8 8.9       Pertinent Cultures:  Culture Date Source Results   10/09 Urine Staph epi   10/20 Blood x1 Staph epi x1   10/31 Blood x2 Sent   11/1 Blood x2 In process       Plan:  Concentration-guided dosing due to renal impairment and intermittent hemodialysis   Chronic HD - outpatient schedule MWF - plan for HD today  Based on age, weight and renal function will order Vancomycin 1500 mg IV x1, further doses will be ordered based on dialysis plans   Pharmacy will continue to monitor patient and adjust therapy as indicated    Thank you for the consult,  Bharti Gonzales RPH  11/1/2024 7:01 AM

## 2024-11-01 NOTE — CONSULTS
regular rhythm.      Pulses:           Radial pulses are 2+ on the right side and 2+ on the left side.        Dorsalis pedis pulses are 1+ on the right side and 1+ on the left side.        Posterior tibial pulses are 1+ on the right side and 1+ on the left side.      Comments: No evidence of any prior arteriovenous access in either upper extremity.  Tunneled dialysis catheter present in the right chest.  Pulmonary:      Effort: Pulmonary effort is normal. No respiratory distress.      Breath sounds: No stridor. No wheezing.   Abdominal:      General: There is no distension.      Palpations: Abdomen is soft.      Tenderness: There is no abdominal tenderness.   Musculoskeletal:         General: No swelling or tenderness. Normal range of motion.      Cervical back: Normal range of motion.   Skin:     General: Skin is warm and dry.   Neurological:      General: No focal deficit present.      Mental Status: She is alert.      Comments: Answer some questions appropriately         Imaging/Labs:     XR CHEST PORTABLE    Result Date: 11/1/2024  1. Moderate cardiomegaly.  No CHF. 2. Suspected atelectatic changes and/or infiltrates in base of left lung. 3. Small pleural effusion versus pleural thickening in left basilar hemithorax. 4. Right IJ double lumen central venous catheter extended right atrium.     XR CHEST PORTABLE    Result Date: 10/31/2024  1. Moderate cardiomegaly. Metallic sternotomy sutures and fasteners in addition to metallic vascular clips from prior surgery. Right jugular dialysis catheter with tip in right atrium. 2. Moderate atelectasis/pneumonia both mid and lower lung fields, most severe in the left lung base. Small effusion left side. 3. Overall appearance of chest slightly worse than prior. **This report has been created using voice recognition software.  It may contain minor errors which are inherent in voice recognition technology.** Electronically signed by Dr. Ubaldo Aquino    CT HEAD WO

## 2024-11-01 NOTE — PROGRESS NOTES
Called patient's son to update him on the status of patient's transfer. All questions answered at this time.

## 2024-11-01 NOTE — CONSULTS
file.    Outpatient Medications:     Medications Prior to Admission: atorvastatin (LIPITOR) 40 MG tablet, Take 1 tablet by mouth daily  aspirin 81 MG EC tablet, Take 1 tablet by mouth daily  apixaban (ELIQUIS) 2.5 MG TABS tablet, Take 1 tablet by mouth 2 times daily  bumetanide (BUMEX) 1 MG tablet, Take 1 tablet by mouth daily  glipiZIDE (GLUCOTROL) 10 MG tablet, Take 1 tablet by mouth daily  sertraline (ZOLOFT) 50 MG tablet, Take 1 tablet by mouth every evening  carvedilol (COREG) 25 MG tablet, Take 1 tablet by mouth 2 times daily (with meals)  sevelamer (RENVELA) 800 MG tablet, Take 1 tablet by mouth 3 times daily (with meals)  hydrALAZINE (APRESOLINE) 25 MG tablet, Take 1 tablet by mouth 3 times daily  folic acid (FOLVITE) 1 MG tablet, Take 1 tablet by mouth daily  metOLazone (ZAROXOLYN) 2.5 MG tablet, Take 1 tablet by mouth daily  ferrous sulfate (IRON 325) 325 (65 Fe) MG tablet, Take 1 tablet by mouth daily (with breakfast)  clopidogrel (PLAVIX) 75 MG tablet, Take 1 tablet by mouth daily  Multiple Vitamins-Minerals (CENTRUM SILVER 50+WOMEN PO), Take 1 tablet by mouth daily  losartan (COZAAR) 100 MG tablet, Take 1 tablet by mouth daily    Current Medications:     Scheduled Meds:    aspirin  81 mg Oral Daily    atorvastatin  40 mg Oral Daily    bumetanide  1 mg Oral Daily    folic acid  1 mg Oral Daily    hydrALAZINE  25 mg Oral TID    sertraline  50 mg Oral QPM    sodium chloride flush  5-40 mL IntraVENous 2 times per day    insulin lispro  0-8 Units SubCUTAneous 4x Daily AC & HS    vancomycin (VANCOCIN) intermittent dosing (placeholder)   Other RX Placeholder    vancomycin  1,500 mg IntraVENous Once    piperacillin-tazobactam  3,375 mg IntraVENous Q12H    carvedilol  25 mg Oral BID WC    apixaban  5 mg Oral BID    levETIRAcetam  500 mg Oral Once per day on Monday Wednesday Friday    [START ON 11/2/2024] levETIRAcetam  1,000 mg Oral Daily    lacosamide  200 mg Oral BID     Continuous Infusions:    sodium  continue scheduled Monday Wednesday Friday. Last session 10/30 with 3L removed at that time   2. Strict Input and Output, Daily weigh and document in the chart.  3. Low Potassium, Low phosphorus and low salt diet. Fluids to be restricted to 1500ml/day.  4. Will order PTH and Phos. Mild hypocalcemia f/u 25 OH D  5. EPO with dialysis  6. BMP Daily  7. Maintain waldron. May need urology consult if patient continues to retain when waldron removed   8. Will need social work to help establish dialysis chair  9. Due to patient needing for AVF Will order vein mapping while inpatient. Vascular surgery already consulted from ED  10. Although tunnel catheter in place for possibly a year, at this time we will continue to use catheter as long as it is working; 1+ blood culture at OSH, will order tunneled line cultures   11. Cystatin c  12. Pharmacy assistance in dosing antiepileptics around her HD treatments     Nutrition   Please ensure that patient is on a renal diet/TF.    Thank you for the consultation. Please do not hesitate to contact us for any further questions/concerns. We will continue to follow along with you.     Kenn Winston MD  Internal Medicine Resident    [facility]     Attending Physician Statement  I have discussed the care of Shahrzad Giordano, including pertinent history and exam findings with the resident/midlevel practitioner I have reviewed the key elements of all parts of the encounter with the resident/fellow. I have seen and examined the patient with the resident/fellow.  I agree with the assessment, plan and orders as documented by the resident/midlevel practitioner.    Joselyn Neumann MD

## 2024-11-01 NOTE — PROCEDURES
tablet 100 mg  100 mg Oral Daily Darion Orantes DO        [Held by provider] metOLazone (ZAROXOLYN) tablet 2.5 mg  2.5 mg Oral Daily Darion Orantes DO        sertraline (ZOLOFT) tablet 50 mg  50 mg Oral QPM Sheri Romero MD   50 mg at 10/30/24 1952    [Held by provider] sevelamer (RENVELA) tablet 800 mg  800 mg Oral TID WC Darion Orantes DO        glucagon injection 1 mg  1 mg SubCUTAneous PRN Darion Orantes DO   1 mg at 10/25/24 0026     Technical Description: This is a 21-channel digital EEG recording with time-locked video. Electrodes were placed in accordance with the 10-20 International System of Electrode Placement. Single lead EKG monitoring was included.    Baseline EEG Recording:  A formal baseline EEG recording was not obtained.     Day 1 - 10/31/24, starting at 15:24    Interictal EEG Samples: At the onset recording, the background activity initially showed bifrontal spike-wave discharges at 2-3 Hz.  As the recording progressed, the background showed diffuse polymorphic theta activity of 5 to 6 Hz.  During behavioral sleep, sleep spindles were seen symmetric over both hemispheres.. The EKG channel revealed no abnormalities.    Ictal EEG Recording / Patient Events: During this period the patient had no events or seizures.    Summary: During this day of recording no events were recorded.  The interictal EEG was abnormal due to diffuse polymorphic theta slowing suggesting mild encephalopathy.  Bifrontal spike-wave discharges were seen at the onset recording which became less frequent as recording progressed suggesting improving encephalopathy.  Monitoring was continued in order to record the patient's typical events. The EKG channel revealed no abnormalities.    BEN TORREZ MD  Diplomate, American Board of Psychiatry and Neurology  Diplomate, American Board of Clinical Neurophysiology  Diplomate, American Board of Epilepsy     Please note this is a preliminary report and updated daily. The final report  will have a summary of behavior and electrographic findings with clinical correlation.

## 2024-11-01 NOTE — PROGRESS NOTES
Dialysis Post Treatment Note  Vitals:    11/01/24 1932   BP: (!) 157/67   Pulse: 65   Resp: 23   Temp: 98.5 °F (36.9 °C)   SpO2:      Pre-Weight = 75 kg  Post-weight = Weight - Scale: 74 kg (163 lb 2.3 oz)  Total Liters Processed = Blood Volume Processed (Liters): 40.88 L  Rinseback Volume (mL) = Rinseback Volume (ml): 300 ml  Net Removal (mL) =  1000 ml  Patient's dry weight=  Type of access used=R perm cath  Length of treatment=123 minutes    Pt tolerated treatment well, vital stable. No issues with perm cath, flushed and locked with heparin. Report given to primary RN Matias HERRERA

## 2024-11-01 NOTE — PLAN OF CARE
Transfer out of neuro ICU  Presented with seizure activity from [facility]  Admitted for LTME, negative for epileptiform activity  Transfer to medicine service  Concern for UTI, started on vanc/zosyn per ICU team  ID consult for antibiotic recommendations  Vimpat taper per neuro CC recommendations    Electronically signed by Phani Golden MD on 11/1/2024 at 5:42 PM

## 2024-11-01 NOTE — PLAN OF CARE
Problem: Safety - Medical Restraint  Goal: Remains free of injury from restraints (Restraint for Interference with Medical Device)  Description: INTERVENTIONS:  1. Determine that other, less restrictive measures have been tried or would not be effective before applying the restraint  2. Evaluate the patient's condition at the time of restraint application  3. Inform patient/family regarding the reason for restraint  4. Q2H: Monitor safety, psychosocial status, comfort, nutrition and hydration  Outcome: Progressing     Problem: Chronic Conditions and Co-morbidities  Goal: Patient's chronic conditions and co-morbidity symptoms are monitored and maintained or improved  Outcome: Progressing     Problem: Discharge Planning  Goal: Discharge to home or other facility with appropriate resources  Outcome: Progressing     Problem: Safety - Adult  Goal: Free from fall injury  Outcome: Progressing

## 2024-11-01 NOTE — PROGRESS NOTES
Dialysis Time Out  To be done by RN and tech or 2 RNs  Staff Names Amira RN and Sandra LIU RN    [x]  Identity of the patient using 2 patient identifiers  [x]  Consent for treatment  [x]  Equipment-proper machine and dialyzer  [x]  B-Hep B status: Hep B Ag Neg 10/9/24  [x]  Orders- to include bath, blood flow, dialyzer, time and fluid removal  [x]  Access-Correct site and in working order  [x]  Time for patient to ask questions.

## 2024-11-01 NOTE — PLAN OF CARE
Problem: Safety - Medical Restraint  Goal: Remains free of injury from restraints (Restraint for Interference with Medical Device)  Description: INTERVENTIONS:  1. Determine that other, less restrictive measures have been tried or would not be effective before applying the restraint  2. Evaluate the patient's condition at the time of restraint application  3. Inform patient/family regarding the reason for restraint  4. Q2H: Monitor safety, psychosocial status, comfort, nutrition and hydration  11/1/2024 1738 by Claudia Ley RN  Outcome: Progressing  11/1/2024 1738 by Claudia Ley RN  Outcome: Progressing  Flowsheets  Taken 11/1/2024 1600 by Ne Mckeon RN  Remains free of injury from restraints (restraint for interference with medical device): Determine that other, less restrictive measures have been tried or would not be effective before applying the restraint  Taken 11/1/2024 1447 by eN Mckeon RN  Remains free of injury from restraints (restraint for interference with medical device): Determine that other, less restrictive measures have been tried or would not be effective before applying the restraint  11/1/2024 0634 by David Acosta RN  Outcome: Progressing     Problem: Chronic Conditions and Co-morbidities  Goal: Patient's chronic conditions and co-morbidity symptoms are monitored and maintained or improved  11/1/2024 1738 by Claudia Ley RN  Outcome: Progressing  11/1/2024 1738 by Claudia Ley RN  Outcome: Progressing  11/1/2024 0634 by David Acosta RN  Outcome: Progressing     Problem: Discharge Planning  Goal: Discharge to home or other facility with appropriate resources  11/1/2024 1738 by Claudia Ley RN  Outcome: Progressing  11/1/2024 1738 by Claudia Ley RN  Outcome: Progressing  11/1/2024 0634 by David Acosta RN  Outcome: Progressing     Problem: Safety - Adult  Goal: Free from fall injury  11/1/2024 1738 by

## 2024-11-01 NOTE — PROGRESS NOTES
Patient examined at bedside.  No acute events overnight, VSS, afebrile.  Tolerating regular diet with no reported episodes of nausea or vomiting.  No seizure-like activity since admission.  Plan for LTME, nephrology consult to manage routine dialysis, vascular surgery consult for possible fistula creation as patient's current tunneled catheter has been in place for approximately 1 year.  While at outlying facility she had 1 positive blood culture, urinalysis consistent with UTI, will start on vancomycin and Zosyn.  Pending results of urine culture will de-escalate antibiotics as appropriate.  If no epileptiform discharges observed on LTME, patient may be transferred to stepdown.    Phani Zelaya MD  11/01/24  12:08 PM

## 2024-11-01 NOTE — PROGRESS NOTES
Pharmacy Note     Renal Dose Adjustment    Shahrzad Giordano is a 54 y.o. female. Pharmacist assessment of renally cleared medications.    Recent Labs     10/31/24  1620 11/01/24  0252   BUN 17 21*       Recent Labs     10/31/24  1620 11/01/24  0252   CREATININE 3.1* 3.4*       Estimated Creatinine Clearance: 18 mL/min (A) (based on SCr of 3.4 mg/dL (H)).  Estimated CrCl using Ideal Body Weight: dialysis patient.    Height:   Ht Readings from Last 1 Encounters:   10/23/24 1.575 m (5' 2\")     Weight:  Wt Readings from Last 1 Encounters:   11/01/24 74.8 kg (164 lb 14.5 oz)       The following medication dose has been adjusted based upon renal function per P&T Guidelines:             - Zosyn 3.375 g IV q6h --> Zosyn 3.375 g IV q12h    Bharti Gonzales, Isaías, BCCCP 11/1/2024 7:08 AM

## 2024-11-02 PROBLEM — Z86.39 HISTORY OF TYPE 2 DIABETES MELLITUS: Status: ACTIVE | Noted: 2024-11-02

## 2024-11-02 LAB
25(OH)D3 SERPL-MCNC: 14.1 NG/ML (ref 30–100)
ANION GAP SERPL CALCULATED.3IONS-SCNC: 12 MMOL/L (ref 9–16)
BACTERIA BLD AEROBE CULT: NORMAL
BACTERIA BLD AEROBE CULT: NORMAL
BUN SERPL-MCNC: 17 MG/DL (ref 6–20)
CALCIUM SERPL-MCNC: 7.4 MG/DL (ref 8.6–10.4)
CHLORIDE SERPL-SCNC: 104 MMOL/L (ref 98–107)
CO2 SERPL-SCNC: 24 MMOL/L (ref 20–31)
CREAT SERPL-MCNC: 2.8 MG/DL (ref 0.6–0.9)
CRYPTOC AG SER QL: NEGATIVE
EKG ATRIAL RATE: 71 BPM
EKG P AXIS: 50 DEGREES
EKG P-R INTERVAL: 146 MS
EKG Q-T INTERVAL: 428 MS
EKG QRS DURATION: 96 MS
EKG QTC CALCULATION (BAZETT): 465 MS
EKG R AXIS: 106 DEGREES
EKG T AXIS: -4 DEGREES
EKG VENTRICULAR RATE: 71 BPM
GFR, ESTIMATED: 20 ML/MIN/1.73M2
GLUCOSE BLD-MCNC: 173 MG/DL (ref 65–105)
GLUCOSE SERPL-MCNC: 95 MG/DL (ref 74–99)
MICROORGANISM SPEC CULT: NORMAL
POTASSIUM SERPL-SCNC: 3.7 MMOL/L (ref 3.7–5.3)
SERVICE CMNT-IMP: NORMAL
SODIUM SERPL-SCNC: 140 MMOL/L (ref 136–145)
SPECIMEN DESCRIPTION: NORMAL

## 2024-11-02 PROCEDURE — 82610 CYSTATIN C: CPT

## 2024-11-02 PROCEDURE — 6370000000 HC RX 637 (ALT 250 FOR IP)

## 2024-11-02 PROCEDURE — 82947 ASSAY GLUCOSE BLOOD QUANT: CPT

## 2024-11-02 PROCEDURE — 80048 BASIC METABOLIC PNL TOTAL CA: CPT

## 2024-11-02 PROCEDURE — 86480 TB TEST CELL IMMUN MEASURE: CPT

## 2024-11-02 PROCEDURE — 2060000000 HC ICU INTERMEDIATE R&B

## 2024-11-02 PROCEDURE — 95714 VEEG EA 12-26 HR UNMNTR: CPT

## 2024-11-02 PROCEDURE — 99232 SBSQ HOSP IP/OBS MODERATE 35: CPT | Performed by: SURGERY

## 2024-11-02 PROCEDURE — 6360000002 HC RX W HCPCS: Performed by: STUDENT IN AN ORGANIZED HEALTH CARE EDUCATION/TRAINING PROGRAM

## 2024-11-02 PROCEDURE — 99232 SBSQ HOSP IP/OBS MODERATE 35: CPT | Performed by: INTERNAL MEDICINE

## 2024-11-02 PROCEDURE — 51798 US URINE CAPACITY MEASURE: CPT

## 2024-11-02 PROCEDURE — 95720 EEG PHY/QHP EA INCR W/VEEG: CPT | Performed by: PSYCHIATRY & NEUROLOGY

## 2024-11-02 PROCEDURE — 51701 INSERT BLADDER CATHETER: CPT

## 2024-11-02 PROCEDURE — 2580000003 HC RX 258

## 2024-11-02 PROCEDURE — 99232 SBSQ HOSP IP/OBS MODERATE 35: CPT | Performed by: STUDENT IN AN ORGANIZED HEALTH CARE EDUCATION/TRAINING PROGRAM

## 2024-11-02 PROCEDURE — 36415 COLL VENOUS BLD VENIPUNCTURE: CPT

## 2024-11-02 PROCEDURE — 93010 ELECTROCARDIOGRAM REPORT: CPT | Performed by: INTERNAL MEDICINE

## 2024-11-02 PROCEDURE — 82306 VITAMIN D 25 HYDROXY: CPT

## 2024-11-02 RX ORDER — HYDRALAZINE HYDROCHLORIDE 50 MG/1
50 TABLET, FILM COATED ORAL 3 TIMES DAILY
Status: DISCONTINUED | OUTPATIENT
Start: 2024-11-02 | End: 2024-11-06 | Stop reason: HOSPADM

## 2024-11-02 RX ORDER — VITAMIN B COMPLEX
1000 TABLET ORAL DAILY
Status: DISCONTINUED | OUTPATIENT
Start: 2024-11-02 | End: 2024-11-06 | Stop reason: HOSPADM

## 2024-11-02 RX ORDER — LORAZEPAM 2 MG/ML
1 INJECTION INTRAMUSCULAR EVERY 4 HOURS PRN
Status: DISCONTINUED | OUTPATIENT
Start: 2024-11-02 | End: 2024-11-06 | Stop reason: HOSPADM

## 2024-11-02 RX ADMIN — ASPIRIN 81 MG: 81 TABLET, COATED ORAL at 09:57

## 2024-11-02 RX ADMIN — LEVETIRACETAM 1000 MG: 500 TABLET, FILM COATED ORAL at 09:57

## 2024-11-02 RX ADMIN — CARVEDILOL 25 MG: 25 TABLET, FILM COATED ORAL at 09:57

## 2024-11-02 RX ADMIN — LORAZEPAM 1 MG: 2 INJECTION INTRAMUSCULAR; INTRAVENOUS at 11:18

## 2024-11-02 RX ADMIN — SODIUM CHLORIDE, PRESERVATIVE FREE 10 ML: 5 INJECTION INTRAVENOUS at 09:58

## 2024-11-02 RX ADMIN — LACOSAMIDE 200 MG: 100 TABLET, FILM COATED ORAL at 20:26

## 2024-11-02 RX ADMIN — HYDRALAZINE HYDROCHLORIDE 50 MG: 50 TABLET ORAL at 20:26

## 2024-11-02 RX ADMIN — BUMETANIDE 1 MG: 1 TABLET ORAL at 09:57

## 2024-11-02 RX ADMIN — FOLIC ACID 1 MG: 1 TABLET ORAL at 09:57

## 2024-11-02 RX ADMIN — LACOSAMIDE 200 MG: 100 TABLET, FILM COATED ORAL at 09:57

## 2024-11-02 RX ADMIN — APIXABAN 5 MG: 5 TABLET, FILM COATED ORAL at 20:26

## 2024-11-02 RX ADMIN — ATORVASTATIN CALCIUM 40 MG: 10 TABLET, FILM COATED ORAL at 20:26

## 2024-11-02 RX ADMIN — SODIUM CHLORIDE, PRESERVATIVE FREE 10 ML: 5 INJECTION INTRAVENOUS at 20:47

## 2024-11-02 RX ADMIN — APIXABAN 5 MG: 5 TABLET, FILM COATED ORAL at 09:57

## 2024-11-02 NOTE — PROCEDURES
LONG-TERM EEG-VIDEO MONITORING   CLINICAL NEUROPHYSIOLOGY LABORATORY  DEPARTMENT OF NEUROLOGY  [facility]    Patient: Shahrzad Giordano  Age: 54 y.o.  MRN: [id number]    Referring Physician: Sheri Romero MD  History: The patient is a 54 y.o. female who presented breakthrough seizure/encephalopathy. This long-term video-EEG monitoring study was performed to determine the nature of the patient's clinical events. The patient is on neuroactive medications.   Shahrzad Giordano   Current Facility-Administered Medications   Medication Dose Route Frequency Provider Last Rate Last Admin    Vitamin D (CHOLECALCIFEROL) tablet 1,000 Units  1,000 Units Oral Daily Kenn Winston MD        hydrALAZINE (APRESOLINE) tablet 50 mg  50 mg Oral TID Kenn Winston MD        LORazepam (ATIVAN) injection 1 mg  1 mg IntraVENous Q4H PRN Salomon Duong DO        aspirin EC tablet 81 mg  81 mg Oral Daily Kieran Malcolm APRN - CNP   81 mg at 11/02/24 0957    atorvastatin (LIPITOR) tablet 40 mg  40 mg Oral Daily Kieran Malcolm APRN - CNP   40 mg at 11/01/24 2032    bumetanide (BUMEX) tablet 1 mg  1 mg Oral Daily Kieran Malcolm APRN - CNP   1 mg at 11/02/24 0957    folic acid (FOLVITE) tablet 1 mg  1 mg Oral Daily Kieran Malcolm APRN - CNP   1 mg at 11/02/24 0957    sertraline (ZOLOFT) tablet 50 mg  50 mg Oral QPM Kieran Malcolm APRN - CNP        sodium chloride flush 0.9 % injection 5-40 mL  5-40 mL IntraVENous 2 times per day Kieran Malcolm APRN - CNP   10 mL at 11/02/24 0958    sodium chloride flush 0.9 % injection 5-40 mL  5-40 mL IntraVENous PRN Kieran Malcolm APRN - CNP        0.9 % sodium chloride infusion   IntraVENous PRN Kieran Malcolm APRN - CNP        magnesium sulfate 2000 mg in 50 mL IVPB premix  2,000 mg IntraVENous PRN Kieran Malcolm APRN - CNP        ondansetron (ZOFRAN-ODT) disintegrating tablet 4 mg  4 mg Oral Q8H PRN Kieran Malcolm APRN - CNP        Or    ondansetron (ZOFRAN)  injection 4 mg  4 mg IntraVENous Q6H PRN Kieran Malcolm APRN - CNP        polyethylene glycol (GLYCOLAX) packet 17 g  17 g Oral Daily PRN Kieran Malcolm APRN - CNP        acetaminophen (TYLENOL) tablet 650 mg  650 mg Oral Q6H PRN Kieran Malcolm APRN - CNP        Or    acetaminophen (TYLENOL) suppository 650 mg  650 mg Rectal Q6H PRN Kieran Malcolm APRN - CNP        glucose chewable tablet 16 g  4 tablet Oral PRN Kieran Malcolm APRN - CNP        dextrose bolus 10% 125 mL  125 mL IntraVENous PRN Kieran Malcolm APRN - CNP        Or    dextrose bolus 10% 250 mL  250 mL IntraVENous PRN Kieran Malcolm APRN - CNP        glucagon injection 1 mg  1 mg SubCUTAneous PRN Kieran Malcolm APRN - CNP        dextrose 10 % infusion   IntraVENous Continuous PRN Kieran Malcolm APRN - CNP        insulin lispro (HUMALOG,ADMELOG) injection vial 0-8 Units  0-8 Units SubCUTAneous 4x Daily AC & HS Kieran Malcolm APRN - CNP        carvedilol (COREG) tablet 25 mg  25 mg Oral BID  Kieran Malcolm APRN - CNP   25 mg at 11/02/24 0957    apixaban (ELIQUIS) tablet 5 mg  5 mg Oral BID Kieran Malcolm APRN - CNP   5 mg at 11/02/24 0957    levETIRAcetam (KEPPRA) tablet 500 mg  500 mg Oral Once per day on Monday Wednesday Friday Kieran Malcolm APRN - CNP   500 mg at 11/01/24 2031    levETIRAcetam (KEPPRA) tablet 1,000 mg  1,000 mg Oral Daily Kieran Malcolm APRN - CNP   1,000 mg at 11/02/24 0957    lacosamide (VIMPAT) tablet 200 mg  200 mg Oral BID Kieran Malcolm APRN - CNP   200 mg at 11/02/24 0957    heparin (porcine) injection 1,600 Units  1,600 Units IntraCATHeter PRN Joselyn Neumann MD   1,600 Units at 11/01/24 1815    heparin (porcine) injection 1,600 Units  1,600 Units IntraCATHeter PRN Joselyn Neumann MD   1,600 Units at 11/01/24 4851     Technical Description: This is a 21-channel digital EEG recording with time-locked video. Electrodes were placed in accordance with the 10-20 International System of Electrode Placement. Single lead EKG monitoring was

## 2024-11-02 NOTE — PROGRESS NOTES
Spiritual Health History and Assessment/Progress Note  [facility]    Spiritual/Emotional Needs,  ,  ,      Name: Shahrzad Giordano MRN: [id number]    Age: 54 y.o.     Sex: female   Language: Slovak   Restorationist: Restorationist   Seizure (HCC)   Date: 11/1/2024            Total Time Calculated: (P) 20 min              Spiritual Assessment began in [facility] ICU        Referral/Consult From: Rounding   Encounter Overview/Reason: Spiritual/Emotional Needs  Service Provided For: Patient       used  Ipad in room to speak with patient.  brought Rosary for patient and asked if it was ok to give it her. Patient gave approval.  provided a supportive presence through words of affirmation which was translated.     Lin, Belief, Meaning:   Patient unable to assess at this time  Family/Friends No family/friends present      Importance and Influence:  Patient unable to assess at this time  Family/Friends No family/friends present    Community:  Patient Other: Unable to assess.  Family/Friends No family/friends present    Assessment and Plan of Care:     Patient Interventions include: Provided sacramental/Sabianism ritual  Family/Friends Interventions include: No family/friends present    Patient Plan of Care: Spiritual Care available upon further referral  Family/Friends Plan of Care: No family/friends present    Electronically signed by Chaplain Luh on 11/1/2024 at 11:09 PM

## 2024-11-02 NOTE — PLAN OF CARE
Problem: Safety - Medical Restraint  Goal: Remains free of injury from restraints (Restraint for Interference with Medical Device)  Description: INTERVENTIONS:  1. Determine that other, less restrictive measures have been tried or would not be effective before applying the restraint  2. Evaluate the patient's condition at the time of restraint application  3. Inform patient/family regarding the reason for restraint  4. Q2H: Monitor safety, psychosocial status, comfort, nutrition and hydration  Outcome: Progressing  Flowsheets (Taken 11/2/2024 0600 by Matias Mitchell RN)  Remains free of injury from restraints (restraint for interference with medical device): Determine that other, less restrictive measures have been tried or would not be effective before applying the restraint

## 2024-11-02 NOTE — PROGRESS NOTES
[facility]  Office: [phone]  Kevin Hernandez DO, Lg Ibarra DO, Sergio Porter DO, Nicolas Zendejas DO, Fatou Jones MD, Corazon Sullivan MD, Lala Wang MD, Vanessa Smith MD,  Phani Golden MD, Joyce Vann MD, Sophy Irizarry MD,  Joelle Sofia DO, Wilfred Starr MD, Dean Pabon MD, Fidel Hernandez DO, Randa Swift MD,  Kenn Sánchez DO, Asiha Perez MD, Africa Villafana MD, Mone Valero MD, Maisha Walker MD,  Popeye Daniel MD, Marilyn Woodson MD, Kade Stern MD, Laz Stern MD, Haim Alan MD, Clinton Cabrera MD, Salomon Duong DO, Michael Bonds MD, Shirley Waterhouse, CNP,  Adrienne Landeros, CNP, Salomon Tovar, CNP,  Ana Wade, SUSIE, Urmila Wong, CNP, Gissel Vera, CNP, Bing Bloom, CNP, Zoraida Schulz, CNP, JACINTO LaC, JACINTO BobC, Olivia Selby, CNP, Gonzalo Bailey, CNP,  Alicia Dover, CNP, Génesis Aguila, CNP,  Suma Sheriff, CNP, Judy Castellanos, CNP         [facility]   IN-PATIENT SERVICE   [facility]    Progress Note    11/2/2024    7:20 AM    Name:   Shahrzad Giordano  MRN:     [id number]     Acct:      [id number]   Room:   0125/0125-01   Day:  1  Admit Date:  11/1/2024  1:36 AM    PCP:   Trang Maciel DO  Code Status:  Prior    Subjective:     C/C: No chief complaint on file.      Patient resting in bed this morning.  Discussed with patient's son who states patient has been agitated overnight, pulling out lines and EEG wires, refusing to talk with the .  Not cooperative with exam this morning.    Brief History:     54-year-old female with past medical history of ESRD on HD, diabetes, paroxysmal A-fib, CAD status post CABG, HTN who was brought to [facility] emergency department from her dialysis center due to witnessed seizure.  She was reportedly 2 hours into her dialysis session when she had a witnessed seizure.  EMS was called, patient was intubated for airway  family history on file.    Vitals:  BP (!) 144/60   Pulse 56   Temp 98 °F (36.7 °C) (Axillary)   Resp 15   Wt 76.8 kg (169 lb 5 oz)   SpO2 98%   BMI 30.97 kg/m²   Temp (24hrs), Av.6 °F (37 °C), Min:98 °F (36.7 °C), Max:99.1 °F (37.3 °C)    Recent Labs     24  0604 24  1118 24  1738 24   POCGLU 77 141* 162* 143*       I/O (24Hr):    Intake/Output Summary (Last 24 hours) at 2024 0720  Last data filed at 2024 0700  Gross per 24 hour   Intake 300 ml   Output 1850 ml   Net -1550 ml       Labs:  Hematology:  Recent Labs     10/31/24  1316 10/31/24  1620 24  0252   WBC 6.1 6.8 8.9   RBC 3.19* 3.26* 3.65*   HGB 8.4* 8.8* 9.6*   HCT 27.9* 28.3* 31.6*   MCV 87.5 86.8 86.6   MCH 26.3 27.0 26.3   MCHC 30.1* 31.1* 30.4   RDW  --   --  15.6*    353 375   MPV 10.3 9.9 10.0     Chemistry:  Recent Labs     10/31/24  1316 10/31/24  1620 24  0252 24  0435 24  1154 24  0424    138 140  --   --  140   K 4.1 4.1 4.0  --   --  3.7    98 102  --   --  104   CO2 29 26 25  --   --  24   GLUCOSE 135* 104 93  --   --  95   BUN 16 17 21*  --   --  17   CREATININE 2.9* 3.1* 3.4*  --   --  2.8*   MG  --  2.0  --   --   --   --    ANIONGAP 9.0 14.0 13  --   --  12   LABGLOM 19* 17* 16*  --   --  20*   CALCIUM 7.8* 7.7* 8.4*  --   --  7.4*   CAION  --  0.94*  --   --  1.07*  --    PHOS  --  3.0  --   --  3.8  --    PROBNP 11687.0*  --   --   --   --   --    TROPHS  --  208*  --  200*  --   --      Recent Labs     10/31/24  1316 10/31/24  1621 10/31/24  2005 11/01/24  0157 24  0252 24  0604 24  1118 24  1738 24   LABA1C  --   --   --   --  6.7*  --   --   --   --    TSH  --   --   --   --  1.63  --   --   --   --    AST 23  --   --   --  26  --   --   --   --    ALT 17  --   --   --  15  --   --   --   --    ALKPHOS 165*  --   --   --  161*  --   --   --   --    BILITOT 0.3  --   --   --  0.3  --   --   --   --

## 2024-11-02 NOTE — PLAN OF CARE
Problem: Safety - Medical Restraint  Goal: Remains free of injury from restraints (Restraint for Interference with Medical Device)  Description: INTERVENTIONS:  1. Determine that other, less restrictive measures have been tried or would not be effective before applying the restraint  2. Evaluate the patient's condition at the time of restraint application  3. Inform patient/family regarding the reason for restraint  4. Q2H: Monitor safety, psychosocial status, comfort, nutrition and hydration  11/1/2024 2346 by Matias Mitchell RN  Outcome: Progressing  Flowsheets  Taken 11/1/2024 2000 by Matias Mitchell RN  Remains free of injury from restraints (restraint for interference with medical device):   Determine that other, less restrictive measures have been tried or would not be effective before applying the restraint   Evaluate the patient's condition at the time of restraint application   Inform patient/family regarding the reason for restraint   Every 2 hours: Monitor safety, psychosocial status, comfort, nutrition and hydration  Taken 11/1/2024 1800 by Ne Mckeon RN  Remains free of injury from restraints (restraint for interference with medical device): Determine that other, less restrictive measures have been tried or would not be effective before applying the restraint  11/1/2024 1738 by Claudia Ley RN  Outcome: Progressing  11/1/2024 1738 by Claudia Ley RN  Outcome: Progressing  Flowsheets  Taken 11/1/2024 1600 by Ne Mckeon RN  Remains free of injury from restraints (restraint for interference with medical device): Determine that other, less restrictive measures have been tried or would not be effective before applying the restraint  Taken 11/1/2024 1447 by Ne Mckeon RN  Remains free of injury from restraints (restraint for interference with medical device): Determine that other, less restrictive measures have been tried or would not be effective before applying the restraint

## 2024-11-02 NOTE — PROGRESS NOTES
due to urinary retention.      Nephrology consulted for need for hemodialysis while inpatient.  Patient is supposedly Monday Wednesday Friday dialysis.  As mentioned above patient does not have dialysis chair initially went to emergency department to receive dialysis.    Last Dialysis was 10/30 with 3 L removed at that time.    Past History/Allergies?Social History:     Past Medical History:   Diagnosis Date    Chronic kidney disease     Diabetes mellitus (HCC)     Hyperlipidemia        No Known Allergies    Social History     Socioeconomic History    Marital status:      Spouse name: Not on file    Number of children: Not on file    Years of education: Not on file    Highest education level: Not on file   Occupational History    Not on file   Tobacco Use    Smoking status: Never    Smokeless tobacco: Never   Substance and Sexual Activity    Alcohol use: Never    Drug use: Never    Sexual activity: Not on file   Other Topics Concern    Not on file   Social History Narrative    Not on file     Social Determinants of Health     Financial Resource Strain: Low Risk  (10/16/2024)    Overall Financial Resource Strain (CARDIA)     Difficulty of Paying Living Expenses: Not hard at all   Food Insecurity: No Food Insecurity (10/25/2024)    Hunger Vital Sign     Worried About Running Out of Food in the Last Year: Never true     Ran Out of Food in the Last Year: Never true   Transportation Needs: No Transportation Needs (10/25/2024)    PRAPARE - Transportation     Lack of Transportation (Medical): No     Lack of Transportation (Non-Medical): No   Physical Activity: Not on file   Stress: Not on file   Social Connections: Not on file   Intimate Partner Violence: Not on file   Housing Stability: Low Risk  (10/25/2024)    Housing Stability Vital Sign     Unable to Pay for Housing in the Last Year: No     Number of Times Moved in the Last Year: 1     Homeless in the Last Year: No       Family History:      No family history  dialysis  6. BMP Daily  7. Bladder scans prn. Straight cath if necessary   8. Will need social work to help establish dialysis chair  9. Due to patient needing for AVF Will order vein mapping while inpatient. Vascular surgery to await acute issues to resolve prior to AV fistula creation   10. Although tunnel catheter in place for possibly a year, at this time we will continue to use catheter as long as it is working; 1+ blood culture at OSH, blood cultures here pending   11. Pharmacy assistance in dosing antiepileptics around her HD treatments       Nutrition   Please ensure that patient is on a renal diet/TF.    Thank you for the consultation. Please do not hesitate to contact us for any further questions/concerns. We will continue to follow along with you.     Kenn Winston MD  Internal Medicine Resident    [facility]   Attending Physician Statement  I have discussed the care of Shahrzad Giordano, including pertinent history and exam findings with the resident/fellow. I have reviewed the key elements of all parts of the encounter with the resident/fellow. I have seen and examined the patient with the resident/fellow.  I agree with the assessment and plan and status of the problem list as documented.    Patient seen and examined with the resident.  Discussed with family as well.  She is still a little confused although certainly more awake and alert.  Family tells me that she was actually having a little conversation with them not long ago before I entered the room.  She did not answer any questions for me though.  Patient dialyzes Monday Wednesday Friday, she had dialysis treatment yesterday which was relatively uneventful.  She is tentatively scheduled for dialysis on Monday.  She does not exhibit any significant lower extremity edema.  On yesterday's dialysis she had about a liter removed.  Following along  Brayan Aguilar MD , MD

## 2024-11-02 NOTE — PROGRESS NOTES
Division of Vascular Surgery        Progress Note    Chief Complaint:      AMS    History of Present Illness:      Shahrzad Giordano is a 54 y.o. Telugu-speaking woman who presents as a transfer from [facility] due to concern for seizures and altered mentation.  History is obtained with the help of Telugu-speaking  and chart review.  Patient answer some questions appropriately.  History is limited.  Per chart review, patient recently moved to [place], [place] from [place] to be closer to family.  Patient was reportedly having dementia like symptoms, and moved to be closer to family.  Medical history includes ESRD on hemodialysis, CHF, A-fib, diabetes, previous stroke, hypertension, hyperlipidemia.  Patient son reports that patient has been dialyzing through a right upper chest tunneled dialysis catheter for approximately 1 year.  Patient has not established with any hemodialysis clinic, and previously been presenting to the emergency department for dialysis treatments.  Patient recently presented to [facility] on 10/23/2024 for dialysis.  During her dialysis treatment she apparently had some seizure-like activity and altered mental status, and shortly after went unresponsive.  Patient was transferred to [facility] for further evaluation and treatment.  Patient reports no prior fistula access.  No evidence of prior upper extremity procedures on exam.    Patient was combative and agitated overnight per sitter. Patient was sleeping whenever I came to exam at that time.    Medical History:     Past Medical History:   Diagnosis Date    Chronic kidney disease     Diabetes mellitus (HCC)     Hyperlipidemia        Surgical History:     No past surgical history on file.    Family History:     No family history on file.    Allergies:       Patient has no known allergies.    Medications:      Current Facility-Administered Medications   Medication Dose Route Frequency Provider Last

## 2024-11-02 NOTE — PROGRESS NOTES
Spiritual Health History and Assessment/Progress Note  [facility]    Follow-up,  ,  ,      Name: Shahrzad Giordano MRN: [id number]    Age: 54 y.o.     Sex: female   Language: South Sudanese   Methodist: Methodist   Seizure (HCC)     Date: 11/2/2024            Total Time Calculated: 19 min              Spiritual Assessment continued in [facility] ICU        Referral/Consult From: Rounding   Encounter Overview/Reason: Follow-up  Service Provided For: Patient    Lin, Belief, Meaning:   Patient identifies as spiritual, is connected with a lin tradition or spiritual practice, and has beliefs or practices that help with coping during difficult times  Family/Friends No family/friends present      Importance and Influence:  Patient has spiritual/personal beliefs that influence decisions regarding their health  Family/Friends No family/friends present    Community:  Patient feels well-supported. Support system includes: Children  Family/Friends No family/friends present    Assessment and Plan of Care:   Patient was awake when  visited. Family was not present at the time. Patient spoke only South [place]ese.  communicated through the . Patient looked anxious and worried. Patient said she was uncomfortable with all the cables tied around her head.  offered support, prayed with patient and reassured her that she was in good hands. Patient was raised Methodist but could not mention the name of her Anabaptism. Patient received sacrament of anointing of the sick.     Patient Interventions include: Facilitated expression of thoughts and feelings, Explored spiritual coping/struggle/distress, Affirmed coping skills/support systems, and Provided sacramental/Congregational ritual  Family/Friends Interventions include: No family/friends present    Patient Plan of Care: Spiritual Care available upon further referral  Family/Friends Plan of Care: No family/friends present    Electronically signed by  Chaplain Thea on 11/2/2024 at 11:22 AM

## 2024-11-03 ENCOUNTER — APPOINTMENT (OUTPATIENT)
Dept: GENERAL RADIOLOGY | Age: 54
DRG: 100 | End: 2024-11-03
Attending: PSYCHIATRY & NEUROLOGY
Payer: COMMERCIAL

## 2024-11-03 PROBLEM — E87.70 HYPERVOLEMIA ASSOCIATED WITH RENAL INSUFFICIENCY: Status: RESOLVED | Noted: 2024-10-02 | Resolved: 2024-11-03

## 2024-11-03 PROBLEM — G40.901 STATUS EPILEPTICUS (HCC): Status: RESOLVED | Noted: 2024-10-31 | Resolved: 2024-11-03

## 2024-11-03 PROBLEM — I16.1 HYPERTENSIVE EMERGENCY: Status: RESOLVED | Noted: 2024-09-26 | Resolved: 2024-11-03

## 2024-11-03 PROBLEM — E87.6 HYPOKALEMIA: Status: RESOLVED | Noted: 2024-09-16 | Resolved: 2024-11-03

## 2024-11-03 PROBLEM — G92.8 TOXIC METABOLIC ENCEPHALOPATHY: Status: ACTIVE | Noted: 2024-11-01

## 2024-11-03 PROBLEM — N28.9 HYPERVOLEMIA ASSOCIATED WITH RENAL INSUFFICIENCY: Status: RESOLVED | Noted: 2024-10-02 | Resolved: 2024-11-03

## 2024-11-03 PROBLEM — G40.109 FOCAL EPILEPSY (HCC): Status: ACTIVE | Noted: 2024-11-03

## 2024-11-03 PROBLEM — E87.20 METABOLIC ACIDOSIS: Status: RESOLVED | Noted: 2024-10-02 | Resolved: 2024-11-03

## 2024-11-03 PROBLEM — I16.0 HYPERTENSIVE URGENCY: Status: RESOLVED | Noted: 2024-09-09 | Resolved: 2024-11-03

## 2024-11-03 PROBLEM — E55.9 VITAMIN D DEFICIENCY: Status: ACTIVE | Noted: 2024-11-03

## 2024-11-03 LAB
ALBUMIN SERPL-MCNC: 3.5 G/DL (ref 3.5–5.2)
ALBUMIN/GLOB SERPL: 1.1 {RATIO} (ref 1–2.5)
ALP SERPL-CCNC: 163 U/L (ref 35–104)
ALT SERPL-CCNC: 10 U/L (ref 10–35)
ANION GAP SERPL CALCULATED.3IONS-SCNC: 14 MMOL/L (ref 9–16)
AST SERPL-CCNC: 22 U/L (ref 10–35)
BASOPHILS # BLD: 0.1 K/UL (ref 0–0.2)
BASOPHILS NFR BLD: 2 % (ref 0–2)
BILIRUB DIRECT SERPL-MCNC: 0.2 MG/DL (ref 0–0.2)
BILIRUB INDIRECT SERPL-MCNC: 0.1 MG/DL (ref 0–1)
BILIRUB SERPL-MCNC: 0.3 MG/DL (ref 0–1.2)
BUN SERPL-MCNC: 29 MG/DL (ref 6–20)
CALCIUM SERPL-MCNC: 8.3 MG/DL (ref 8.6–10.4)
CHLORIDE SERPL-SCNC: 103 MMOL/L (ref 98–107)
CO2 SERPL-SCNC: 23 MMOL/L (ref 20–31)
CREAT SERPL-MCNC: 3.8 MG/DL (ref 0.6–0.9)
EOSINOPHIL # BLD: 0.36 K/UL (ref 0–0.44)
EOSINOPHILS RELATIVE PERCENT: 6 % (ref 1–4)
ERYTHROCYTE [DISTWIDTH] IN BLOOD BY AUTOMATED COUNT: 15.8 % (ref 11.8–14.4)
GFR, ESTIMATED: 14 ML/MIN/1.73M2
GLOBULIN SER CALC-MCNC: 3.1 G/DL
GLUCOSE BLD-MCNC: 130 MG/DL (ref 65–105)
GLUCOSE BLD-MCNC: 142 MG/DL (ref 65–105)
GLUCOSE BLD-MCNC: 160 MG/DL (ref 65–105)
GLUCOSE BLD-MCNC: 174 MG/DL (ref 65–105)
GLUCOSE SERPL-MCNC: 134 MG/DL (ref 74–99)
HCT VFR BLD AUTO: 31 % (ref 36.3–47.1)
HGB BLD-MCNC: 9.3 G/DL (ref 11.9–15.1)
IMM GRANULOCYTES # BLD AUTO: <0.03 K/UL (ref 0–0.3)
IMM GRANULOCYTES NFR BLD: 0 %
LYMPHOCYTES NFR BLD: 2.26 K/UL (ref 1.1–3.7)
LYMPHOCYTES RELATIVE PERCENT: 35 % (ref 24–43)
MCH RBC QN AUTO: 26.5 PG (ref 25.2–33.5)
MCHC RBC AUTO-ENTMCNC: 30 G/DL (ref 28.4–34.8)
MCV RBC AUTO: 88.3 FL (ref 82.6–102.9)
MONOCYTES NFR BLD: 0.75 K/UL (ref 0.1–1.2)
MONOCYTES NFR BLD: 12 % (ref 3–12)
NEUTROPHILS NFR BLD: 45 % (ref 36–65)
NEUTS SEG NFR BLD: 3.01 K/UL (ref 1.5–8.1)
NRBC BLD-RTO: 0 PER 100 WBC
PLATELET # BLD AUTO: 348 K/UL (ref 138–453)
PMV BLD AUTO: 10 FL (ref 8.1–13.5)
POTASSIUM SERPL-SCNC: 4.2 MMOL/L (ref 3.7–5.3)
PROT SERPL-MCNC: 6.6 G/DL (ref 6.6–8.7)
RBC # BLD AUTO: 3.51 M/UL (ref 3.95–5.11)
RBC # BLD: ABNORMAL 10*6/UL
SODIUM SERPL-SCNC: 140 MMOL/L (ref 136–145)
VAS LEFT BASILIC VEIN LOWER ARM PROX DIAM: 0.2 CM
VAS LEFT BASILIC VEIN UPPER ARM DIST DIAM: 0.3 CM
VAS LEFT BASILIC VEIN UPPER ARM PROX DIAM: 0.6 CM
VAS LEFT BASLIC VEIN LOWER ARM DIST DIAM: 0.1 CM
VAS LEFT BRACHIAL A DIST PSV: 66.3 CM/S
VAS LEFT CEPHALIC VEIN LOWER ARM DIST DIAM: 0.1 CM
VAS LEFT CEPHALIC VEIN LOWER ARM MID DIAM: 0.2 CM
VAS LEFT CEPHALIC VEIN LOWER ARM PROX DIAM: 0.2 CM
VAS LEFT CEPHALIC VEIN UPPER ARM DIST DIAM: 0.2 CM
VAS LEFT CEPHALIC VEIN UPPER ARM MID DIAM: 0.2 CM
VAS LEFT CEPHALIC VEIN UPPER ARM PROX DIAM: 0.3 CM
VAS LEFT RADIAL A DIST PSV: 82.6 CM/S
VAS LEFT RADIAL DIST AP DIAM: 0.24 CM
VAS LEFT ULNAR A DIST PSV: 93.8 CM/S
VAS LEFT ULNAR DIST AP DIAM: 0.23 CM
VAS RIGHT BASILIC VEIN LOWER ARM PROX DIAM: 0.1 CM
VAS RIGHT BASILIC VEIN UPPER ARM DIST DIAM: 0.3 CM
VAS RIGHT BASILIC VEIN UPPER ARM PROX DIAM: 0.4 CM
VAS RIGHT BASLIC VEIN LOWER ARM DIST DIAM: 0.1 CM
VAS RIGHT BRACHIAL A DIST PSV: 75.8 CM/S
VAS RIGHT BRACHIAL DIST AP DIAM: 0.54 CM
VAS RIGHT CEPHALIC VEIN LOWER ARM DIST DIAM: 0.1 CM
VAS RIGHT CEPHALIC VEIN LOWER ARM MID DIAM: 0.2 CM
VAS RIGHT CEPHALIC VEIN LOWER ARM PROX DIAM: 0.3 CM
VAS RIGHT CEPHALIC VEIN UPPER ARM DIST DIAM: 0.4 CM
VAS RIGHT RADIAL DIST AP DIAM: 0.14 CM
VAS RIGHT ULNAR A DIST PSV: 100 CM/S
VAS RIGHT ULNAR DIST AP DIAM: 0.29 CM
WBC OTHER # BLD: 6.5 K/UL (ref 3.5–11.3)

## 2024-11-03 PROCEDURE — 6370000000 HC RX 637 (ALT 250 FOR IP)

## 2024-11-03 PROCEDURE — 80076 HEPATIC FUNCTION PANEL: CPT

## 2024-11-03 PROCEDURE — 82947 ASSAY GLUCOSE BLOOD QUANT: CPT

## 2024-11-03 PROCEDURE — 2580000003 HC RX 258

## 2024-11-03 PROCEDURE — 2060000000 HC ICU INTERMEDIATE R&B

## 2024-11-03 PROCEDURE — 71045 X-RAY EXAM CHEST 1 VIEW: CPT

## 2024-11-03 PROCEDURE — 36415 COLL VENOUS BLD VENIPUNCTURE: CPT

## 2024-11-03 PROCEDURE — 93970 EXTREMITY STUDY: CPT | Performed by: SURGERY

## 2024-11-03 PROCEDURE — 99232 SBSQ HOSP IP/OBS MODERATE 35: CPT | Performed by: STUDENT IN AN ORGANIZED HEALTH CARE EDUCATION/TRAINING PROGRAM

## 2024-11-03 PROCEDURE — 99232 SBSQ HOSP IP/OBS MODERATE 35: CPT | Performed by: INTERNAL MEDICINE

## 2024-11-03 PROCEDURE — 6360000002 HC RX W HCPCS: Performed by: STUDENT IN AN ORGANIZED HEALTH CARE EDUCATION/TRAINING PROGRAM

## 2024-11-03 PROCEDURE — 80048 BASIC METABOLIC PNL TOTAL CA: CPT

## 2024-11-03 PROCEDURE — 99223 1ST HOSP IP/OBS HIGH 75: CPT | Performed by: PSYCHIATRY & NEUROLOGY

## 2024-11-03 PROCEDURE — 95720 EEG PHY/QHP EA INCR W/VEEG: CPT | Performed by: PSYCHIATRY & NEUROLOGY

## 2024-11-03 PROCEDURE — 95714 VEEG EA 12-26 HR UNMNTR: CPT

## 2024-11-03 PROCEDURE — 85025 COMPLETE CBC W/AUTO DIFF WBC: CPT

## 2024-11-03 PROCEDURE — 51701 INSERT BLADDER CATHETER: CPT

## 2024-11-03 RX ORDER — LOSARTAN POTASSIUM 50 MG/1
100 TABLET ORAL DAILY
Status: DISCONTINUED | OUTPATIENT
Start: 2024-11-03 | End: 2024-11-06 | Stop reason: HOSPADM

## 2024-11-03 RX ADMIN — SODIUM CHLORIDE, PRESERVATIVE FREE 10 ML: 5 INJECTION INTRAVENOUS at 20:11

## 2024-11-03 RX ADMIN — BUMETANIDE 1 MG: 1 TABLET ORAL at 09:40

## 2024-11-03 RX ADMIN — LEVETIRACETAM 1000 MG: 500 TABLET, FILM COATED ORAL at 09:38

## 2024-11-03 RX ADMIN — ASPIRIN 81 MG: 81 TABLET, COATED ORAL at 09:37

## 2024-11-03 RX ADMIN — CHOLECALCIFEROL TAB 25 MCG (1000 UNIT) 1000 UNITS: 25 TAB at 09:38

## 2024-11-03 RX ADMIN — LORAZEPAM 1 MG: 2 INJECTION INTRAMUSCULAR; INTRAVENOUS at 06:55

## 2024-11-03 RX ADMIN — HYDRALAZINE HYDROCHLORIDE 50 MG: 50 TABLET ORAL at 09:37

## 2024-11-03 RX ADMIN — SERTRALINE HYDROCHLORIDE 50 MG: 50 TABLET ORAL at 17:51

## 2024-11-03 RX ADMIN — ATORVASTATIN CALCIUM 40 MG: 10 TABLET, FILM COATED ORAL at 20:10

## 2024-11-03 RX ADMIN — LACOSAMIDE 200 MG: 100 TABLET, FILM COATED ORAL at 20:10

## 2024-11-03 RX ADMIN — FOLIC ACID 1 MG: 1 TABLET ORAL at 09:38

## 2024-11-03 RX ADMIN — LACOSAMIDE 200 MG: 100 TABLET, FILM COATED ORAL at 09:38

## 2024-11-03 RX ADMIN — APIXABAN 5 MG: 5 TABLET, FILM COATED ORAL at 09:38

## 2024-11-03 RX ADMIN — HYDRALAZINE HYDROCHLORIDE 50 MG: 50 TABLET ORAL at 14:19

## 2024-11-03 RX ADMIN — HYDRALAZINE HYDROCHLORIDE 50 MG: 50 TABLET ORAL at 20:10

## 2024-11-03 RX ADMIN — APIXABAN 5 MG: 5 TABLET, FILM COATED ORAL at 20:10

## 2024-11-03 RX ADMIN — CARVEDILOL 25 MG: 25 TABLET, FILM COATED ORAL at 09:38

## 2024-11-03 RX ADMIN — LOSARTAN POTASSIUM 100 MG: 50 TABLET, FILM COATED ORAL at 09:38

## 2024-11-03 RX ADMIN — CARVEDILOL 25 MG: 25 TABLET, FILM COATED ORAL at 17:51

## 2024-11-03 RX ADMIN — SODIUM CHLORIDE, PRESERVATIVE FREE 10 ML: 5 INJECTION INTRAVENOUS at 09:38

## 2024-11-03 NOTE — PLAN OF CARE
Problem: Safety - Medical Restraint  Goal: Remains free of injury from restraints (Restraint for Interference with Medical Device)  11/3/2024 1007 by Sonali Mckeon RN  Outcome: Progressing  Flowsheets  Taken 11/3/2024 0600 by Vanessa Duncan RN  Remains free of injury from restraints (restraint for interference with medical device): Every 2 hours: Monitor safety, psychosocial status, comfort, nutrition and hydration  Taken 11/3/2024 0400 by Vanessa Duncan RN  Remains free of injury from restraints (restraint for interference with medical device): Every 2 hours: Monitor safety, psychosocial status, comfort, nutrition and hydration  Taken 11/3/2024 0200 by Vanessa Duncan RN  Remains free of injury from restraints (restraint for interference with medical device): Every 2 hours: Monitor safety, psychosocial status, comfort, nutrition and hydration  11/3/2024 0122 by Sofya Wolf RN  Outcome: Progressing  Flowsheets (Taken 11/2/2024 2000)  Remains free of injury from restraints (restraint for interference with medical device): Determine that other, less restrictive measures have been tried or would not be effective before applying the restraint     Problem: Chronic Conditions and Co-morbidities  Goal: Patient's chronic conditions and co-morbidity symptoms are monitored and maintained or improved  11/3/2024 1007 by Sonali Mckeon RN  Outcome: Progressing  11/3/2024 0122 by Sofya Wolf RN  Outcome: Progressing     Problem: Discharge Planning  Goal: Discharge to home or other facility with appropriate resources  Outcome: Progressing     Problem: Safety - Adult  Goal: Free from fall injury  11/3/2024 1007 by Sonali Mckeon RN  Outcome: Progressing  11/3/2024 0122 by Sofya Wolf RN  Outcome: Progressing     Problem: Pain  Goal: Verbalizes/displays adequate comfort level or baseline comfort level  Outcome: Progressing

## 2024-11-03 NOTE — PLAN OF CARE
Problem: Safety - Medical Restraint  Goal: Remains free of injury from restraints (Restraint for Interference with Medical Device)  Description: INTERVENTIONS:  1. Determine that other, less restrictive measures have been tried or would not be effective before applying the restraint  2. Evaluate the patient's condition at the time of restraint application  3. Inform patient/family regarding the reason for restraint  4. Q2H: Monitor safety, psychosocial status, comfort, nutrition and hydration  11/3/2024 0122 by Sofya Wolf RN  Outcome: Progressing  Flowsheets (Taken 11/2/2024 2000)  Remains free of injury from restraints (restraint for interference with medical device): Determine that other, less restrictive measures have been tried or would not be effective before applying the restraint     Problem: Chronic Conditions and Co-morbidities  Goal: Patient's chronic conditions and co-morbidity symptoms are monitored and maintained or improved  Outcome: Progressing     Problem: Safety - Adult  Goal: Free from fall injury  Outcome: Progressing

## 2024-11-03 NOTE — CONSULTS
General Neurology Consult Note      Patient: Shahrzad Giordano : 1970  MRN: [id number]   Date: 24   Reason for consult: seizure management   Information obtained from: Patient, chart review  Allergies:  Patient has no known allergies.    History of Presenting Illness      Shahrzad Giordano is a 54 y.o. female with a history of ESRD on HD, diabetes, paroxysmal A-fib, CAD status post CABG, HTN who was brought to [facility] emergency department from her dialysis center due to witnessed seizure.  She was reportedly 2 hours into her dialysis session when she had a witnessed seizure.  EMS was called, patient was intubated for airway protection and was transferred to [facility] for further management due to continued seizures.  She was placed on LTME which showed no seizure activity to date but shows occasional right and independent frequent left mid temporal sharp waves. Pt was seen and examined 11/3, non focal but frequently perseverating.     Review of Systems   Unable to perform ROS: Mental status change     Past Histories & Current Inpatient Medications         Diagnosis Date    Chronic kidney disease     Diabetes mellitus (HCC)     Hyperlipidemia      No past surgical history on file.  Social History     Socioeconomic History    Marital status:      Spouse name: Not on file    Number of children: Not on file    Years of education: Not on file    Highest education level: Not on file   Occupational History    Not on file   Tobacco Use    Smoking status: Never    Smokeless tobacco: Never   Substance and Sexual Activity    Alcohol use: Never    Drug use: Never    Sexual activity: Not on file   Other Topics Concern    Not on file   Social History Narrative    Not on file     Social Determinants of Health     Financial Resource Strain: Low Risk  (10/16/2024)    Overall Financial Resource Strain (CARDIA)     Difficulty of Paying Living Expenses: Not hard at all   Food Insecurity:  --   --    BILITOT 0.3  --   --  0.3  --   --   --    NITRU  --   --  NEGATIVE  --   --   --   --    COLORU  --   --  Yellow  --   --   --   --    BACTERIA  --   --  MANY*  --   --   --   --      Recent Labs     11/01/24  0252   ALKPHOS 161*   ALT 15   AST 26   BILITOT 0.3       Radiology Review:    XR CHEST PORTABLE Result Date: 11/1/2024  Moderate cardiomegaly.  No CHF.   Suspected atelectatic changes and/or infiltrates in base of left lung.   Small pleural effusion versus pleural thickening in left basilar hemithorax.   Right IJ double lumen central venous catheter extended right atrium.     XR CHEST PORTABLE Result Date: 10/31/2024  Moderate cardiomegaly. Metallic sternotomy sutures and fasteners in addition to metallic vascular clips from prior surgery. Right jugular dialysis catheter with tip in right atrium.   Moderate atelectasis/pneumonia both mid and lower lung fields, most severe in the left lung base. Small effusion left side.   Overall appearance of chest slightly worse than prior.     CT HEAD WO CONTRAST Result Date: 10/31/2024  No acute intracranial hemorrhage   Areas of old infarct in the right temporal lobe and right temporo-occipital lobe.   Mild diffuse cerebral volume loss.     LTME  Day 1 - 11/1/24, starting at 12:51 pm  Ictal EEG Recording / Patient Events: During this period the patient had no events or seizures.     Summary: During this day of recording no events were recorded. The interictal EEG was abnormal due to frequent left mid temporal sharp waves which conferred an increased risk for focal onset seizures. Monitoring was continued in order to record the patient's typical events. The EKG channel revealed no abnormalities.     Day 2 - 11/2/24  Ictal EEG Recording / Patient Events: During this period the patient had no events or seizures.     Summary: During this day of recording no events were recorded. The interictal EEG was abnormal due to occasional right and independent  frequent

## 2024-11-03 NOTE — PROCEDURES
LONG-TERM EEG-VIDEO MONITORING   CLINICAL NEUROPHYSIOLOGY LABORATORY  DEPARTMENT OF NEUROLOGY  [facility]    Patient: Shahrzad Giordano  Age: 54 y.o.  MRN: [id number]    Referring Physician: Sheri Romero MD  History: The patient is a 54 y.o. female who presented breakthrough seizure/encephalopathy. This long-term video-EEG monitoring study was performed to determine the nature of the patient's clinical events. The patient is on neuroactive medications.   Shahrzad Giordano   Current Facility-Administered Medications   Medication Dose Route Frequency Provider Last Rate Last Admin    losartan (COZAAR) tablet 100 mg  100 mg Oral Daily Kenn Winston MD        Vitamin D (CHOLECALCIFEROL) tablet 1,000 Units  1,000 Units Oral Daily Kenn Winston MD        hydrALAZINE (APRESOLINE) tablet 50 mg  50 mg Oral TID Kenn Winston MD   50 mg at 11/02/24 2026    LORazepam (ATIVAN) injection 1 mg  1 mg IntraVENous Q4H PRN Salomon Duong DO   1 mg at 11/03/24 0655    aspirin EC tablet 81 mg  81 mg Oral Daily Kieran Malcolm APRN - CNP   81 mg at 11/02/24 0957    atorvastatin (LIPITOR) tablet 40 mg  40 mg Oral Daily Kieran Malcolm APRN - CNP   40 mg at 11/02/24 2026    bumetanide (BUMEX) tablet 1 mg  1 mg Oral Daily Kieran Malcolm APRN - CNP   1 mg at 11/02/24 0957    folic acid (FOLVITE) tablet 1 mg  1 mg Oral Daily Kieran Malcolm APRN - CNP   1 mg at 11/02/24 0957    sertraline (ZOLOFT) tablet 50 mg  50 mg Oral QPM Kieran Malcolm APRN - CNP        sodium chloride flush 0.9 % injection 5-40 mL  5-40 mL IntraVENous 2 times per day Kieran Malcolm APRN - CNP   10 mL at 11/02/24 2047    sodium chloride flush 0.9 % injection 5-40 mL  5-40 mL IntraVENous PRN Kieran Malcolm APRN - CNP        0.9 % sodium chloride infusion   IntraVENous PRN Kieran Malcolm APRN - CNP        magnesium sulfate 2000 mg in 50 mL IVPB premix  2,000 mg IntraVENous PRN Kieran Malcolm, MARCIO - CNP        ondansetron

## 2024-11-03 NOTE — PROGRESS NOTES
Renal progress Note    Patient :  Shahrzad Giordano; 54 y.o. MRN# [id number]  Location:  0103/0103-01  Attending:  Kenn Sánchez DO  Admit Date:  11/1/2024   Hospital Day: 2    Subjective:    Examined at bedside. Patient received ativan 1-2 hours prior to examination. She is in deep sleep on examination.    Chart reviewed. BP high. Restarted home dose of losartan. Still making urine - 500ml OU     EEG still showing left mid temporal sharp waves that confer an increased risk for focal onset seizures     History of Present Illness:     Shahrzad Giordano; 54 y.o. female past medical history of ESRD on hemodialysis Monday Wednesday Friday, type 2 diabetes, hyperlipidemia, HFrEF 30% ejection fraction, atrial fibrillation, chronic left ICA stenosis with history of cerebral infarctions, and hypertension initially presented to [facility] emergency department on 10/23 due  for dialysis.     Patient's encephalopathic and history obtained from chart      Patient supposedly is from [place] to recently moved to [facility] temporarily due to son's job.  Seems the patient did not have family within the region and does not have a permanent dialysis chair.  Patient presented to [facility] due to symptoms listed above.  In the emergency department seems that patient was receiving dialysis and had witnessed seizure during dialysis.  During seizure seems that eventually CODE BLUE was called and patient had cardiac arrest.  Patient was intubated ROSC eventually was achieved.  Patient was then brought to the ICU started on pressors and sedative.  Eventually patient was extubated to normal floors at [facility].  She is a patient has had recurrent episodes of seizure-like activity at [facility] patient transferred to [facility] due to seizures.    Although patient is hemodialysis patient she does still make urine.  Patient had to be straight cath multiple times at [facility] due to urinary retention.      Nephrology

## 2024-11-03 NOTE — PROGRESS NOTES
[facility]  Office: [phone]  Kevin Hernandez DO, Lg Ibarra DO, Sergio Porter DO, Nicolas Zendejas DO, Fatou Jones MD, Corazon Sullivan MD, Lala Wang MD, Vanessa Smith MD,  Phani Golden MD, Joyce Vann MD, Sophy Irizarry MD,  Joelle Sofia DO, Wilfred tSarr MD, Dean Pabon MD, Fidel Hernandez DO, Randa Swift MD,  Kenn Sánchez DO, Aisha Perez MD, Africa Villafana MD, Mone Valero MD, Maisha Walker MD,  Popeye Daniel MD, Marilyn Woodson MD, Kade Stern MD, Laz Stern MD, Haim Alan MD, Clinton Cabrera MD, Salomon Duong DO, Michael Bonds MD, Shirley Waterhouse, CNP,  Adrienne Landeros CNP, Salomon Tovar, RICHIE,  Ana Wade, SUSIE, Urmila Wong, CNP, Gissel Vera, CNP, Bing Bloom, CNP, Zoraida Schulz, CNP, JACINTO LaC, JACINTO BobC, Olivia Selby, RICHIE, Gonzalo Bailey, CNP,  Alicia Dover, CNP, Génesis Aguila, CNP,  Suma Sheriff, CNP, Judy Castellanos, CNP         [facility]   IN-PATIENT SERVICE   [facility]    Progress Note    11/3/2024    1:23 PM    Name:   Shahrzad Giordano  MRN:     [id number]     Acct:      [id number]   Room:   [facility] Day:  2  Admit Date:  11/1/2024  1:36 AM    PCP:   Trang Maciel DO  Code Status:  Full Code    Subjective:     C/C: Seizure.    Interval History Status: improved.     Vitals reviewed, afebrile but intermittently hypertensive otherwise stable.  Saturating well on room air.  Labs reviewed, elevated BUN and creatinine 29 and 3.8 respectively.  Urinalysis initially concerning for UTI.  Blood cultures negative in house x 2 but 1 culture positive for coagulase-negative likely contaminant.  CT head reviewed from 10/23/24 with multiple remote infarcts but no acute intracranial findings.   CT head reviewed from 10/31/24 with areas of old infarct in the right temporal lobe and right temporo-occipital lobe.   MRI brain reviewed from 10/25/24 with areas  effusion versus pleural thickening in left basilar hemithorax. 4. Right IJ double lumen central venous catheter extended right atrium.     XR CHEST PORTABLE    Result Date: 10/31/2024  1. Moderate cardiomegaly. Metallic sternotomy sutures and fasteners in addition to metallic vascular clips from prior surgery. Right jugular dialysis catheter with tip in right atrium. 2. Moderate atelectasis/pneumonia both mid and lower lung fields, most severe in the left lung base. Small effusion left side. 3. Overall appearance of chest slightly worse than prior. **This report has been created using voice recognition software.  It may contain minor errors which are inherent in voice recognition technology.** Electronically signed by Dr. Ubaldo Aquino    CT HEAD WO CONTRAST    Result Date: 10/31/2024  1. No acute intracranial hemorrhage 2. Areas of old infarct in the right temporal lobe and right temporo-occipital lobe. 3. Mild diffuse cerebral volume loss. **This report has been created using voice recognition software.  It may contain minor errors which are inherent in voice recognition technology.** Electronically signed by Dr. Cassandra Day    Physical Examination:        General appearance:  alert, uncooperative and no distress  Mental Status:  oriented to person, place did not answer time. Flat affect.   Lungs:  clear to auscultation bilaterally but very poor effort.   Heart:  regular rate and rhythm, no murmur  Abdomen:  soft, nontender, nondistended  Extremities:  no tenderness in the calves  Skin:  no gross lesions, rashes, induration on exposed skin.     Assessment:        Hospital Problems             Last Modified POA    * (Principal) Encephalopathy, unspecified 11/3/2024 Yes    Non-insulin dependent type 2 diabetes mellitus (HCC) 11/3/2024 Yes    ESRF (end stage renal failure) (HCC) 11/1/2024 Yes    History of CAD (coronary artery disease) 11/3/2024 Yes    History of stroke 11/3/2024 Yes    Essential hypertension

## 2024-11-04 ENCOUNTER — APPOINTMENT (OUTPATIENT)
Dept: DIALYSIS | Age: 54
DRG: 100 | End: 2024-11-04
Attending: PSYCHIATRY & NEUROLOGY
Payer: COMMERCIAL

## 2024-11-04 PROBLEM — G40.909 SEIZURE DISORDER (HCC): Status: ACTIVE | Noted: 2024-11-01

## 2024-11-04 PROBLEM — D63.8 ANEMIA OF CHRONIC DISEASE: Status: ACTIVE | Noted: 2024-11-04

## 2024-11-04 LAB
AMMONIA PLAS-SCNC: 28 UMOL/L (ref 11–51)
ANION GAP SERPL CALCULATED.3IONS-SCNC: 15 MMOL/L (ref 9–16)
BASOPHILS # BLD: 0.07 K/UL (ref 0–0.2)
BASOPHILS NFR BLD: 1 % (ref 0–2)
BUN SERPL-MCNC: 37 MG/DL (ref 6–20)
CALCIUM SERPL-MCNC: 8.2 MG/DL (ref 8.6–10.4)
CHLORIDE SERPL-SCNC: 101 MMOL/L (ref 98–107)
CO2 SERPL-SCNC: 22 MMOL/L (ref 20–31)
CREAT SERPL-MCNC: 4.2 MG/DL (ref 0.6–0.9)
EOSINOPHIL # BLD: 0.49 K/UL (ref 0–0.44)
EOSINOPHILS RELATIVE PERCENT: 7 % (ref 1–4)
ERYTHROCYTE [DISTWIDTH] IN BLOOD BY AUTOMATED COUNT: 15.8 % (ref 11.8–14.4)
GFR, ESTIMATED: 12 ML/MIN/1.73M2
GLUCOSE BLD-MCNC: 117 MG/DL (ref 65–105)
GLUCOSE BLD-MCNC: 167 MG/DL (ref 65–105)
GLUCOSE BLD-MCNC: 174 MG/DL (ref 65–105)
GLUCOSE BLD-MCNC: 198 MG/DL (ref 65–105)
GLUCOSE SERPL-MCNC: 104 MG/DL (ref 74–99)
HCT VFR BLD AUTO: 29.9 % (ref 36.3–47.1)
HGB BLD-MCNC: 9 G/DL (ref 11.9–15.1)
IMM GRANULOCYTES # BLD AUTO: <0.03 K/UL (ref 0–0.3)
IMM GRANULOCYTES NFR BLD: 0 %
LYMPHOCYTES NFR BLD: 2.72 K/UL (ref 1.1–3.7)
LYMPHOCYTES RELATIVE PERCENT: 41 % (ref 24–43)
M PNEUMO IGG SER QL IA: 0.48
M PNEUMO IGM SER QL IA: 0.22
MCH RBC QN AUTO: 26.9 PG (ref 25.2–33.5)
MCHC RBC AUTO-ENTMCNC: 30.1 G/DL (ref 28.4–34.8)
MCV RBC AUTO: 89.3 FL (ref 82.6–102.9)
MONOCYTES NFR BLD: 0.78 K/UL (ref 0.1–1.2)
MONOCYTES NFR BLD: 12 % (ref 3–12)
NEUTROPHILS NFR BLD: 39 % (ref 36–65)
NEUTS SEG NFR BLD: 2.62 K/UL (ref 1.5–8.1)
NRBC BLD-RTO: 0 PER 100 WBC
PLATELET # BLD AUTO: 296 K/UL (ref 138–453)
PMV BLD AUTO: 9.7 FL (ref 8.1–13.5)
POTASSIUM SERPL-SCNC: 3.8 MMOL/L (ref 3.7–5.3)
RBC # BLD AUTO: 3.35 M/UL (ref 3.95–5.11)
RBC # BLD: ABNORMAL 10*6/UL
SODIUM SERPL-SCNC: 138 MMOL/L (ref 136–145)
WBC OTHER # BLD: 6.7 K/UL (ref 3.5–11.3)

## 2024-11-04 PROCEDURE — 82140 ASSAY OF AMMONIA: CPT

## 2024-11-04 PROCEDURE — 99232 SBSQ HOSP IP/OBS MODERATE 35: CPT | Performed by: INTERNAL MEDICINE

## 2024-11-04 PROCEDURE — 2060000000 HC ICU INTERMEDIATE R&B

## 2024-11-04 PROCEDURE — 36415 COLL VENOUS BLD VENIPUNCTURE: CPT

## 2024-11-04 PROCEDURE — 6360000002 HC RX W HCPCS: Performed by: STUDENT IN AN ORGANIZED HEALTH CARE EDUCATION/TRAINING PROGRAM

## 2024-11-04 PROCEDURE — 6370000000 HC RX 637 (ALT 250 FOR IP)

## 2024-11-04 PROCEDURE — 6360000002 HC RX W HCPCS: Performed by: INTERNAL MEDICINE

## 2024-11-04 PROCEDURE — 99232 SBSQ HOSP IP/OBS MODERATE 35: CPT | Performed by: STUDENT IN AN ORGANIZED HEALTH CARE EDUCATION/TRAINING PROGRAM

## 2024-11-04 PROCEDURE — 85025 COMPLETE CBC W/AUTO DIFF WBC: CPT

## 2024-11-04 PROCEDURE — 2580000003 HC RX 258

## 2024-11-04 PROCEDURE — 99232 SBSQ HOSP IP/OBS MODERATE 35: CPT | Performed by: PSYCHIATRY & NEUROLOGY

## 2024-11-04 PROCEDURE — 80048 BASIC METABOLIC PNL TOTAL CA: CPT

## 2024-11-04 PROCEDURE — 82947 ASSAY GLUCOSE BLOOD QUANT: CPT

## 2024-11-04 RX ORDER — HALOPERIDOL 5 MG/ML
5 INJECTION INTRAMUSCULAR EVERY 6 HOURS PRN
Status: DISCONTINUED | OUTPATIENT
Start: 2024-11-04 | End: 2024-11-06 | Stop reason: HOSPADM

## 2024-11-04 RX ADMIN — APIXABAN 5 MG: 5 TABLET, FILM COATED ORAL at 20:00

## 2024-11-04 RX ADMIN — LOSARTAN POTASSIUM 100 MG: 50 TABLET, FILM COATED ORAL at 09:10

## 2024-11-04 RX ADMIN — SODIUM CHLORIDE, PRESERVATIVE FREE 10 ML: 5 INJECTION INTRAVENOUS at 09:09

## 2024-11-04 RX ADMIN — ERYTHROPOIETIN 2000 UNITS: 2000 INJECTION, SOLUTION INTRAVENOUS; SUBCUTANEOUS at 18:05

## 2024-11-04 RX ADMIN — HEPARIN SODIUM 1600 UNITS: 1000 INJECTION INTRAVENOUS; SUBCUTANEOUS at 19:12

## 2024-11-04 RX ADMIN — ATORVASTATIN CALCIUM 40 MG: 10 TABLET, FILM COATED ORAL at 20:00

## 2024-11-04 RX ADMIN — CHOLECALCIFEROL TAB 25 MCG (1000 UNIT) 1000 UNITS: 25 TAB at 09:10

## 2024-11-04 RX ADMIN — HYDRALAZINE HYDROCHLORIDE 50 MG: 50 TABLET ORAL at 09:10

## 2024-11-04 RX ADMIN — ASPIRIN 81 MG: 81 TABLET, COATED ORAL at 09:10

## 2024-11-04 RX ADMIN — CARVEDILOL 25 MG: 25 TABLET, FILM COATED ORAL at 09:10

## 2024-11-04 RX ADMIN — BUMETANIDE 1 MG: 1 TABLET ORAL at 09:11

## 2024-11-04 RX ADMIN — HALOPERIDOL LACTATE 5 MG: 5 INJECTION, SOLUTION INTRAMUSCULAR at 16:20

## 2024-11-04 RX ADMIN — INSULIN LISPRO 2 UNITS: 100 INJECTION, SOLUTION INTRAVENOUS; SUBCUTANEOUS at 20:08

## 2024-11-04 RX ADMIN — HYDRALAZINE HYDROCHLORIDE 50 MG: 50 TABLET ORAL at 19:59

## 2024-11-04 RX ADMIN — LACOSAMIDE 200 MG: 100 TABLET, FILM COATED ORAL at 19:59

## 2024-11-04 RX ADMIN — APIXABAN 5 MG: 5 TABLET, FILM COATED ORAL at 09:10

## 2024-11-04 RX ADMIN — LEVETIRACETAM 1000 MG: 500 TABLET, FILM COATED ORAL at 09:09

## 2024-11-04 RX ADMIN — FOLIC ACID 1 MG: 1 TABLET ORAL at 09:11

## 2024-11-04 RX ADMIN — LEVETIRACETAM 500 MG: 500 TABLET, FILM COATED ORAL at 20:00

## 2024-11-04 RX ADMIN — LORAZEPAM 1 MG: 2 INJECTION INTRAMUSCULAR; INTRAVENOUS at 15:07

## 2024-11-04 RX ADMIN — LORAZEPAM 1 MG: 2 INJECTION INTRAMUSCULAR; INTRAVENOUS at 10:43

## 2024-11-04 RX ADMIN — ERYTHROPOIETIN 3000 UNITS: 3000 INJECTION, SOLUTION INTRAVENOUS; SUBCUTANEOUS at 18:05

## 2024-11-04 RX ADMIN — LACOSAMIDE 200 MG: 100 TABLET, FILM COATED ORAL at 09:10

## 2024-11-04 ASSESSMENT — PAIN SCALES - GENERAL
PAINLEVEL_OUTOF10: 0
PAINLEVEL_OUTOF10: 0

## 2024-11-04 NOTE — PROGRESS NOTES
[facility]  Office: [phone]  Kevin Hernandez DO, Lg Ibarra DO, Sergio Porter DO, Nicolas Zendejas DO, Fatou Jones MD, Corazon Sullivan MD, Lala Wang MD, Vanessa Smith MD,  Phani Golden MD, Joyce Vann MD, Sophy Irizarry MD,  Joelle Sofia DO, Wilfred Starr MD, Dean Pabon MD, Fidel Hernandez DO, Randa Swift MD,  Kenn Sánchez DO, Aisha Perez MD, Africa Villafana MD, Mone Valero MD, Maisha Walker MD,  Popeye Daniel MD, Marilyn Woodson MD, Kade Stern MD, Laz Stern MD, Haim Alan MD, Clinton Cabrera MD, Salomon Duong DO, Michael Bonds MD, Shirley Waterhouse, CNP,  Adrienne Landeros CNP, Salomon Tovar, RICHIE,  Ana Wade, SUSIE, Urmila Wong, CNP, Gissel Vera, CNP, Bing Bloom, CNP, Zoraida Schulz, CNP, JACINTO LaC, JACINTO BobC, Olivia Selby, RICHIE, Gonzalo Bailey, CNP,  Alicia Dover, CNP, Génesis Aguila, CNP,  Suma Sheriff, CNP, Judy Castellanos, CNP         [facility]   IN-PATIENT SERVICE   [facility]    Progress Note    11/4/2024    7:40 AM    Name:   Shahrzad Giordano  MRN:     [id number]     Acct:      [id number]   Room:   [facility] Day:  3  Admit Date:  11/1/2024  1:36 AM    PCP:   Trang Maciel DO  Code Status:  Full Code    Subjective:     C/C: Seizure.    Interval History Status: improved.     Vitals reviewed, afebrile but intermittently hypertensive otherwise stable.  Saturating well on room air.  Labs reviewed, elevated BUN and creatinine 37 and 4.2 respectively. Ammonia 28.  Urinalysis initially concerning for UTI.  Blood cultures negative in house x 3 but 1 culture positive for coagulase-negative likely contaminant.  CT head reviewed from 10/23/24 with multiple remote infarcts but no acute intracranial findings.   CT head reviewed from 10/31/24 with areas of old infarct in the right temporal lobe and right temporo-occipital lobe.   MRI brain reviewed from 10/25/24

## 2024-11-04 NOTE — CONSULTS
Platelets 296 138 - 453 k/uL    MPV 9.7 8.1 - 13.5 fL    NRBC Automated 0.0 0.0 per 100 WBC    Neutrophils % 39 36 - 65 %    Lymphocytes % 41 24 - 43 %    Monocytes % 12 3 - 12 %    Eosinophils % 7 (H) 1 - 4 %    Basophils % 1 0 - 2 %    Immature Granulocytes % 0 0 %    Neutrophils Absolute 2.62 1.50 - 8.10 k/uL    Lymphocytes Absolute 2.72 1.10 - 3.70 k/uL    Monocytes Absolute 0.78 0.10 - 1.20 k/uL    Eosinophils Absolute 0.49 (H) 0.00 - 0.44 k/uL    Basophils Absolute 0.07 0.00 - 0.20 k/uL    Immature Granulocytes Absolute <0.03 0.00 - 0.30 k/uL    RBC Morphology ANISOCYTOSIS PRESENT    POC Glucose Fingerstick    Collection Time: 11/04/24  7:24 AM   Result Value Ref Range    POC Glucose 117 (H) 65 - 105 mg/dL       Radiology Review:    XR CHEST PORTABLE Result Date: 11/1/2024  Moderate cardiomegaly.  No CHF.   Suspected atelectatic changes and/or infiltrates in base of left lung.   Small pleural effusion versus pleural thickening in left basilar hemithorax.   Right IJ double lumen central venous catheter extended right atrium.      XR CHEST PORTABLE Result Date: 10/31/2024  Moderate cardiomegaly. Metallic sternotomy sutures and fasteners in addition to metallic vascular clips from prior surgery. Right jugular dialysis catheter with tip in right atrium.   Moderate atelectasis/pneumonia both mid and lower lung fields, most severe in the left lung base. Small effusion left side.   Overall appearance of chest slightly worse than prior.      CT HEAD WO CONTRAST Result Date: 10/31/2024  No acute intracranial hemorrhage   Areas of old infarct in the right temporal lobe and right temporo-occipital lobe.   Mild diffuse cerebral volume loss.      LTME  Day 1 - 11/1/24, starting at 12:51 pm  Ictal EEG Recording / Patient Events: During this period the patient had no events or seizures.     Summary: During this day of recording no events were recorded. The interictal EEG was abnormal due to frequent left mid temporal sharp  Trang Maciel, DO

## 2024-11-04 NOTE — PROGRESS NOTES
0240    Order Status: Completed Specimen: Blood Updated: 11/04/24 0209     Specimen Description .BLOOD     Special Requests l arm 1ml     Culture NO GROWTH 3 DAYS    Culture, Blood 1 [[id number]] Collected: 10/31/24 1323    Order Status: Completed Specimen: Blood Updated: 11/02/24 1404     Blood Culture, Routine No growth 24 hours. No growth 48 hours.    Narrative:      Source: blood-Adult-exceeds >5.7oz/set optimal vol.       Site: Peripheral Vein            Current Antibiotics: not stated    Culture, Blood 2 [[id number]] Collected: 10/31/24 1316    Order Status: Completed Specimen: Blood Updated: 11/02/24 1404     Blood Culture, Routine No growth 24 hours. No growth 48 hours.    Narrative:      Source: blood-Adult-optimal 5.5-5.7oz/set volume       Site: Peripheral Vein            Current Antibiotics: not stated    Culture, Urine [[id number]]  (Abnormal) Collected: 10/31/24 1300    Order Status: Completed Specimen: Urine Updated: 11/01/24 0941     Urine Culture, Routine No significant pathogens isolated. Growth likely represents skin jose or distal urethral jose.     Organism Growth of Contaminants    Narrative:      Source: urine       Site: clean void          Current Antibiotics: not stated    MRSA by PCR [[id number]] Collected: 10/23/24 1945    Order Status: Completed Specimen: Nares Updated: 10/24/24 1005     MRSA SCREEN RT-PCR NEGATIVE     Comment: No MRSA detected by Real Time - Polymerase Chain Reaction.  Specimen Source:  Nares  Performed at [facility] [address] [place] [address]                   Medical Decision Making-Other:     Note:      Thank you for allowing us to participate in the care of this patient. Please call with questions.    Isai Oliveira MD,   Office: [phone]

## 2024-11-04 NOTE — PROGRESS NOTES
Renal Progress Note    Patient :  Shahrzad Giordano; 54 y.o. MRN# [id number]  Location:  0103/0103-01  Attending:  Kenn Sánchez DO  Admit Date:  11/1/2024   Hospital Day: 3    Subjective:     Patient was seen and examined.  No new issues reported overnight.  Patient still has positive confusion and has a sitter in room.  She has been currently getting dialysis as per MWF schedule.  BMP results from today reviewed sodium 138, potassium 3.8, chloride 101, bicarb 22, calcium 8.2, BUN 37, creatinine 4.2 mg/dl.    Outpatient Medications:     Medications Prior to Admission: atorvastatin (LIPITOR) 40 MG tablet, Take 1 tablet by mouth daily  aspirin 81 MG EC tablet, Take 1 tablet by mouth daily  apixaban (ELIQUIS) 2.5 MG TABS tablet, Take 1 tablet by mouth 2 times daily  bumetanide (BUMEX) 1 MG tablet, Take 1 tablet by mouth daily  glipiZIDE (GLUCOTROL) 10 MG tablet, Take 1 tablet by mouth daily  sertraline (ZOLOFT) 50 MG tablet, Take 1 tablet by mouth every evening  carvedilol (COREG) 25 MG tablet, Take 1 tablet by mouth 2 times daily (with meals)  sevelamer (RENVELA) 800 MG tablet, Take 1 tablet by mouth 3 times daily (with meals)  hydrALAZINE (APRESOLINE) 25 MG tablet, Take 1 tablet by mouth 3 times daily  folic acid (FOLVITE) 1 MG tablet, Take 1 tablet by mouth daily  metOLazone (ZAROXOLYN) 2.5 MG tablet, Take 1 tablet by mouth daily  ferrous sulfate (IRON 325) 325 (65 Fe) MG tablet, Take 1 tablet by mouth daily (with breakfast)  clopidogrel (PLAVIX) 75 MG tablet, Take 1 tablet by mouth daily  Multiple Vitamins-Minerals (CENTRUM SILVER 50+WOMEN PO), Take 1 tablet by mouth daily  losartan (COZAAR) 100 MG tablet, Take 1 tablet by mouth daily    Current Medications:     Scheduled Meds:    losartan  100 mg Oral Daily    Vitamin D  1,000 Units Oral Daily    hydrALAZINE  50 mg Oral TID    aspirin  81 mg Oral Daily    atorvastatin  40 mg Oral Daily    bumetanide  1 mg Oral Daily    folic acid  1 mg Oral Daily

## 2024-11-04 NOTE — PROGRESS NOTES
PHARMACY NOTE:    The electrolyte replacement protocol for potassium/magnesium has been discontinued per P&T guidelines because the patient has reduced renal function (CrCl < 30 mL/min).      The patient's most recent potassium & magnesium levels are:  Recent Labs     11/02/24  0424 11/03/24  0401 11/04/24  0519   K 3.7 4.2 3.8     Estimated Creatinine Clearance: 15 mL/min (A) (based on SCr of 4.2 mg/dL (H)).    For patients with decreased renal function (below 30ml/min) needing potassium/magnesium supplementation, please order individual bolus doses with appropriate monitoring.      Please contact the inpatient pharmacy with any concerns.  Thank you.

## 2024-11-04 NOTE — ADT AUTH CERT
Diagnoses: R56.9 (ICD-10-CM) - Seizure (HCC), G40.901 (ICD-10-CM) - Status epilepticus (HCC), status epilepticus

## 2024-11-04 NOTE — PROGRESS NOTES
Dialysis Time Out  To be done by RN and tech or 2 RNs  Staff Names Amara RN and Joseph RN    [x]  Identity of the patient using 2 patient identifiers  [x]  Consent for treatment  [x]  Equipment-proper machine and dialyzer  [x]  B-Hep B status (hep ag neg 11/2/24)  [x]  Orders- to include bath, blood flow, dialyzer, time and fluid removal  [x]  Access-Correct site and in working order  [x]  Time for patient to ask questions.

## 2024-11-04 NOTE — PLAN OF CARE
Problem: Pain  Goal: Verbalizes/displays adequate comfort level or baseline comfort level  11/3/2024 2121 by Grace Campa RN  Outcome: Progressing  11/3/2024 1007 by Sonali Mckeon RN  Outcome: Progressing        Problem: Chronic Conditions and Co-morbidities  Goal: Patient's chronic conditions and co-morbidity symptoms are monitored and maintained or improved  11/3/2024 2121 by Grace Campa RN  Outcome: Progressing  11/3/2024 1007 by Sonali Mckeon RN  Outcome: Progressing     Problem: Discharge Planning  Goal: Discharge to home or other facility with appropriate resources  11/3/2024 2121 by Grace Campa RN  Outcome: Progressing  11/3/2024 1007 by Sonali Mckeon RN  Outcome: Progressing     Problem: Safety - Adult  Goal: Free from fall injury  11/3/2024 2121 by Grace Campa RN  Outcome: Progressing  11/3/2024 1007 by Sonali Mckeon RN  Outcome: Progressing

## 2024-11-04 NOTE — CARE COORDINATION
Attempted to see pt for assessment, has sitter and combative. RN states pt son speaks English called to obtain add info- no answer  Will attempt to see pt again as time allows

## 2024-11-05 LAB
ANION GAP SERPL CALCULATED.3IONS-SCNC: 14 MMOL/L (ref 9–16)
BACTERIA BLD AEROBE CULT: NORMAL
BACTERIA BLD AEROBE CULT: NORMAL
BASOPHILS # BLD: 0.07 K/UL (ref 0–0.2)
BASOPHILS NFR BLD: 1 % (ref 0–2)
BUN SERPL-MCNC: 19 MG/DL (ref 6–20)
CALCIUM SERPL-MCNC: 8.3 MG/DL (ref 8.6–10.4)
CHLORIDE SERPL-SCNC: 97 MMOL/L (ref 98–107)
CO2 SERPL-SCNC: 24 MMOL/L (ref 20–31)
CREAT SERPL-MCNC: 2.73 MG/DL (ref 0.59–1.01)
CREAT SERPL-MCNC: 2.8 MG/DL (ref 0.6–0.9)
CYSTATIN C: 3.89 MG/L (ref 0.61–0.95)
EGFR BY CYSTATIN C: 15
EOSINOPHIL # BLD: 0.42 K/UL (ref 0–0.44)
EOSINOPHILS RELATIVE PERCENT: 6 % (ref 1–4)
ERYTHROCYTE [DISTWIDTH] IN BLOOD BY AUTOMATED COUNT: 15.9 % (ref 11.8–14.4)
GFR, ESTIMATED: 20 ML/MIN/1.73M2
GLUCOSE BLD-MCNC: 131 MG/DL (ref 65–105)
GLUCOSE BLD-MCNC: 203 MG/DL (ref 65–105)
GLUCOSE BLD-MCNC: 234 MG/DL (ref 65–105)
GLUCOSE SERPL-MCNC: 136 MG/DL (ref 74–99)
HCT VFR BLD AUTO: 30.9 % (ref 36.3–47.1)
HGB BLD-MCNC: 9.4 G/DL (ref 11.9–15.1)
IMM GRANULOCYTES # BLD AUTO: <0.03 K/UL (ref 0–0.3)
IMM GRANULOCYTES NFR BLD: 0 %
LYMPHOCYTES NFR BLD: 3.09 K/UL (ref 1.1–3.7)
LYMPHOCYTES RELATIVE PERCENT: 43 % (ref 24–43)
MCH RBC QN AUTO: 26.5 PG (ref 25.2–33.5)
MCHC RBC AUTO-ENTMCNC: 30.4 G/DL (ref 28.4–34.8)
MCV RBC AUTO: 87 FL (ref 82.6–102.9)
MONOCYTES NFR BLD: 0.68 K/UL (ref 0.1–1.2)
MONOCYTES NFR BLD: 10 % (ref 3–12)
NEUTROPHILS NFR BLD: 40 % (ref 36–65)
NEUTS SEG NFR BLD: 2.8 K/UL (ref 1.5–8.1)
NRBC BLD-RTO: 0 PER 100 WBC
PLATELET # BLD AUTO: 317 K/UL (ref 138–453)
PMV BLD AUTO: 10.1 FL (ref 8.1–13.5)
POTASSIUM SERPL-SCNC: 3.6 MMOL/L (ref 3.7–5.3)
RBC # BLD AUTO: 3.55 M/UL (ref 3.95–5.11)
RBC # BLD: ABNORMAL 10*6/UL
SODIUM SERPL-SCNC: 135 MMOL/L (ref 136–145)
WBC OTHER # BLD: 7.1 K/UL (ref 3.5–11.3)

## 2024-11-05 PROCEDURE — 85025 COMPLETE CBC W/AUTO DIFF WBC: CPT

## 2024-11-05 PROCEDURE — 99232 SBSQ HOSP IP/OBS MODERATE 35: CPT | Performed by: INTERNAL MEDICINE

## 2024-11-05 PROCEDURE — 2580000003 HC RX 258

## 2024-11-05 PROCEDURE — 36415 COLL VENOUS BLD VENIPUNCTURE: CPT

## 2024-11-05 PROCEDURE — 82947 ASSAY GLUCOSE BLOOD QUANT: CPT

## 2024-11-05 PROCEDURE — 2060000000 HC ICU INTERMEDIATE R&B

## 2024-11-05 PROCEDURE — 80048 BASIC METABOLIC PNL TOTAL CA: CPT

## 2024-11-05 PROCEDURE — 6370000000 HC RX 637 (ALT 250 FOR IP)

## 2024-11-05 PROCEDURE — 99232 SBSQ HOSP IP/OBS MODERATE 35: CPT | Performed by: PSYCHIATRY & NEUROLOGY

## 2024-11-05 PROCEDURE — 6360000002 HC RX W HCPCS: Performed by: STUDENT IN AN ORGANIZED HEALTH CARE EDUCATION/TRAINING PROGRAM

## 2024-11-05 PROCEDURE — 92507 TX SP LANG VOICE COMM INDIV: CPT

## 2024-11-05 PROCEDURE — 6370000000 HC RX 637 (ALT 250 FOR IP): Performed by: INTERNAL MEDICINE

## 2024-11-05 RX ORDER — LACOSAMIDE 200 MG/1
200 TABLET ORAL 2 TIMES DAILY
Status: SHIPPED | OUTPATIENT
Start: 2024-11-05 | End: 2024-11-06

## 2024-11-05 RX ORDER — VITAMIN B COMPLEX
1000 TABLET ORAL DAILY
DISCHARGE
Start: 2024-11-06 | End: 2024-11-06

## 2024-11-05 RX ORDER — RISPERIDONE 0.5 MG/1
0.25 TABLET ORAL NIGHTLY
Status: DISCONTINUED | OUTPATIENT
Start: 2024-11-05 | End: 2024-11-06

## 2024-11-05 RX ORDER — HYDRALAZINE HYDROCHLORIDE 50 MG/1
50 TABLET, FILM COATED ORAL 3 TIMES DAILY
DISCHARGE
Start: 2024-11-05 | End: 2024-11-06

## 2024-11-05 RX ORDER — LEVETIRACETAM 1000 MG/1
1000 TABLET ORAL DAILY
DISCHARGE
Start: 2024-11-06 | End: 2024-11-06

## 2024-11-05 RX ORDER — LEVETIRACETAM 500 MG/1
500 TABLET ORAL
DISCHARGE
Start: 2024-11-06 | End: 2024-11-06

## 2024-11-05 RX ADMIN — LEVETIRACETAM 1000 MG: 500 TABLET, FILM COATED ORAL at 09:59

## 2024-11-05 RX ADMIN — SERTRALINE HYDROCHLORIDE 50 MG: 50 TABLET ORAL at 17:35

## 2024-11-05 RX ADMIN — RISPERIDONE 0.25 MG: 0.5 TABLET, FILM COATED ORAL at 20:00

## 2024-11-05 RX ADMIN — SODIUM CHLORIDE, PRESERVATIVE FREE 10 ML: 5 INJECTION INTRAVENOUS at 10:01

## 2024-11-05 RX ADMIN — ATORVASTATIN CALCIUM 40 MG: 10 TABLET, FILM COATED ORAL at 20:01

## 2024-11-05 RX ADMIN — APIXABAN 5 MG: 5 TABLET, FILM COATED ORAL at 10:00

## 2024-11-05 RX ADMIN — CARVEDILOL 25 MG: 25 TABLET, FILM COATED ORAL at 09:59

## 2024-11-05 RX ADMIN — FOLIC ACID 1 MG: 1 TABLET ORAL at 10:00

## 2024-11-05 RX ADMIN — ACETAMINOPHEN 650 MG: 325 TABLET ORAL at 20:00

## 2024-11-05 RX ADMIN — HYDRALAZINE HYDROCHLORIDE 50 MG: 50 TABLET ORAL at 14:27

## 2024-11-05 RX ADMIN — ASPIRIN 81 MG: 81 TABLET, COATED ORAL at 10:01

## 2024-11-05 RX ADMIN — LACOSAMIDE 200 MG: 100 TABLET, FILM COATED ORAL at 19:59

## 2024-11-05 RX ADMIN — HALOPERIDOL LACTATE 5 MG: 5 INJECTION, SOLUTION INTRAMUSCULAR at 06:53

## 2024-11-05 RX ADMIN — INSULIN LISPRO 2 UNITS: 100 INJECTION, SOLUTION INTRAVENOUS; SUBCUTANEOUS at 12:50

## 2024-11-05 RX ADMIN — HYDRALAZINE HYDROCHLORIDE 50 MG: 50 TABLET ORAL at 10:00

## 2024-11-05 RX ADMIN — CARVEDILOL 25 MG: 25 TABLET, FILM COATED ORAL at 17:35

## 2024-11-05 RX ADMIN — SODIUM CHLORIDE, PRESERVATIVE FREE 10 ML: 5 INJECTION INTRAVENOUS at 20:01

## 2024-11-05 RX ADMIN — LOSARTAN POTASSIUM 100 MG: 50 TABLET, FILM COATED ORAL at 10:00

## 2024-11-05 RX ADMIN — APIXABAN 5 MG: 5 TABLET, FILM COATED ORAL at 20:01

## 2024-11-05 RX ADMIN — INSULIN LISPRO 2 UNITS: 100 INJECTION, SOLUTION INTRAVENOUS; SUBCUTANEOUS at 20:01

## 2024-11-05 RX ADMIN — LACOSAMIDE 200 MG: 100 TABLET, FILM COATED ORAL at 10:00

## 2024-11-05 RX ADMIN — HYDRALAZINE HYDROCHLORIDE 50 MG: 50 TABLET ORAL at 20:00

## 2024-11-05 RX ADMIN — BUMETANIDE 1 MG: 1 TABLET ORAL at 10:01

## 2024-11-05 RX ADMIN — CHOLECALCIFEROL TAB 25 MCG (1000 UNIT) 1000 UNITS: 25 TAB at 10:01

## 2024-11-05 ASSESSMENT — PAIN DESCRIPTION - LOCATION
LOCATION: HEAD
LOCATION: HEAD

## 2024-11-05 ASSESSMENT — PAIN - FUNCTIONAL ASSESSMENT: PAIN_FUNCTIONAL_ASSESSMENT: ACTIVITIES ARE NOT PREVENTED

## 2024-11-05 ASSESSMENT — PAIN SCALES - GENERAL
PAINLEVEL_OUTOF10: 1
PAINLEVEL_OUTOF10: 0
PAINLEVEL_OUTOF10: 5
PAINLEVEL_OUTOF10: 2

## 2024-11-05 ASSESSMENT — PAIN DESCRIPTION - PAIN TYPE: TYPE: ACUTE PAIN

## 2024-11-05 ASSESSMENT — PAIN DESCRIPTION - ONSET: ONSET: ON-GOING

## 2024-11-05 ASSESSMENT — PAIN DESCRIPTION - DESCRIPTORS: DESCRIPTORS: THROBBING

## 2024-11-05 NOTE — PROGRESS NOTES
Dialysis Post Treatment Note  Vitals:    11/04/24 1915   BP: (!) 124/93   Pulse: 72   Resp: 16   Temp: 98.4 °F (36.9 °C)   SpO2:      Pre-Weight = 74.5 kg  Post-weight = Weight - Scale: 73.6 kg (162 lb 4.1 oz)  Total Liters Processed = Blood Volume Processed (Liters): 64.9 L  Rinseback Volume (mL) = Rinseback Volume (ml): 300 ml  Net Removal (mL) =  900  Patient's dry weight=  Type of access used= R chest perm cath  Length of treatment=193    Pt combative pre tx. Unable to come to unit via stretcher per primary nurse. Informed that pt was standing in corner of room swatting and pinching staff.Pt with sitter and received ativan pre dialysis. She continued to pull at lines and remove monitors. She also managed to dislodge her piv.. Haldol IM given, pt remained combative. Soft wrist restraints placed. Frequent arterial pressure alarms triggered as well. Tx stopped early as venous chamber was clotting. Pt stable and and more relaxed post HD.Writer also informed that pt was unable to communicate with interpretor for reasons unknown.

## 2024-11-05 NOTE — PROGRESS NOTES
[facility] - Progress Note    Today's Date and Time: 11/5/2024, 9:22 AM    Impression :   Bacteruria  Concern raised for UTI causing AMS  This does not seem to be the case by clinical exam  Single blood culture with Coagulase negative Staphylococci, likely contaminant  Seizures  ESRD on hemodialysis  HFrEF  Atrial fibrillation  Normocytic anemia  Type 2 diabetes  Hypertension  Hyperlipidemia  Aggressive behavior   Symptoms of progressive dementia while in [place]    Recommendations:   Patient with continued abnormal behavior. Defiant with answering questions and taking medications.  Urine and blood cultures: No growth   Monitor off antibiotics   Serologic studies:  T pallidum: negative   Cryptococcal antigen: negative   Mycoplasma: negative   HIV: negative   TB: pending   No additional seizures    Medical Decision Making/Summary/Discussion:11/5/2024         Infection Control Recommendations   [place] Precautions    Antimicrobial Stewardship Recommendations     Monitor off antibiotics     Coordination of Outpatient Care:   Estimated Length of IV antimicrobials:TBD  Patient will need Midline Catheter Insertion: TBD  Patient will need PICC line Insertion:TBD  Patient will need: Home IV , Infusion Center,  SNF,  LTAC: TBD  Patient will need outpatient wound care: no     Chief complaint/reason for consultation:   Concern for UTI and antibiotic recommendations      History of Present Illness:   Shahrzad Giordano is a 54 y.o.-year-old  female who was initially admitted on 11/1/2024.     Patient seen at the request of Dr. Golden.    INITIAL HISTORY:    This patient has a past medical history ESRD on hemodialysis. HFrEF, atrial fibrillation, normoctytic anemia, type 2 diabetes, hypertension, and hyperlipidemia.      Patient came to the [facility] 8 weeks ago from [place] because of her son's job. She reportedly was exhibiting signs of dementia while in [place] and she had no  Culture, Blood 1 [[id number]] Collected: 11/01/24 0240    Order Status: Completed Specimen: Blood Updated: 11/05/24 0209     Specimen Description .BLOOD     Special Requests l arm 1ml     Culture NO GROWTH 4 DAYS    Culture, Blood 1 [[id number]] Collected: 10/31/24 1323    Order Status: Completed Specimen: Blood Updated: 11/02/24 1404     Blood Culture, Routine No growth 24 hours. No growth 48 hours.    Narrative:      Source: blood-Adult-exceeds >5.7oz/set optimal vol.       Site: Peripheral Vein            Current Antibiotics: not stated    Culture, Blood 2 [[id number]] Collected: 10/31/24 1316    Order Status: Completed Specimen: Blood Updated: 11/02/24 1404     Blood Culture, Routine No growth 24 hours. No growth 48 hours.    Narrative:      Source: blood-Adult-optimal 5.5-5.7oz/set volume       Site: Peripheral Vein            Current Antibiotics: not stated    Culture, Urine [[id number]]  (Abnormal) Collected: 10/31/24 1300    Order Status: Completed Specimen: Urine Updated: 11/01/24 0941     Urine Culture, Routine No significant pathogens isolated. Growth likely represents skin jose or distal urethral jose.     Organism Growth of Contaminants    Narrative:      Source: urine       Site: clean void          Current Antibiotics: not stated    MRSA by PCR [[id number]] Collected: 10/23/24 1945    Order Status: Completed Specimen: Nares Updated: 10/24/24 1005     MRSA SCREEN RT-PCR NEGATIVE     Comment: No MRSA detected by Real Time - Polymerase Chain Reaction.  Specimen Source:  Nares  Performed at [facility] [place] [address]                   Medical Decision Making-Other:     Note:    Thank you for allowing us to participate in the care of this patient. Please call with questions. Evaluation of this patient was performed under supervision of the attending.    BLAKE COOKSEY, MS4           ATTESTATION:    I have discussed the case, including pertinent history and exam findings with

## 2024-11-05 NOTE — PROGRESS NOTES
Speech Language Pathology  [facility]    Speech Language Treatment Note    Date: 11/5/2024  Patient’s Name: Shahrzad Giordano  MRN: [id number]  Diagnosis:   Patient Active Problem List   Diagnosis Code    Non-insulin dependent type 2 diabetes mellitus (HCC) E11.9    Normocytic anemia D64.9    Shortness of breath R06.02    ESRF (end stage renal failure) ([facility]) N18.6    Stroke-like episode R29.90    Acute encephalopathy G93.40    Community acquired pneumonia J18.9    History of CAD (coronary artery disease) Z86.79    History of stroke Z86.73    Anxiety F41.9    Altered mental status, unspecified R41.82    Stage 5 chronic kidney disease (HCC) N18.5    Essential hypertension I10    Uremic encephalopathy G93.49, N19    Noncompliance with medication regimen Z91.148    Acute pulmonary edema J81.0    Systolic dysfunction, left ventricle I51.9    Internal carotid artery stenosis, left I65.22    Iron deficiency anemia D50.9    Acute on chronic HFrEF (heart failure with reduced ejection fraction) ([facility]) I50.23    Atrial fibrillation ([facility]) I48.91    Hyperlipidemia E78.5    Coronary artery disease involving native coronary artery of native heart without angina pectoris I25.10    Encephalopathy G93.40    GIB (gastrointestinal bleeding) K92.2    Obesity (BMI 30.0-34.9) E66.811    Respiratory arrest (HCC) R09.2    Seizures ([facility]) R56.9    Acute metabolic encephalopathy G93.41    Seizure (HCC) R56.9    Seizure disorder (HCC) G40.909    Chronic heart failure with preserved ejection fraction ([facility]) I50.32    Moderate dementia (HCC) F03.B0    Bacteremia R78.81    History of type 2 diabetes mellitus Z86.39    Vitamin D deficiency E55.9    Focal epilepsy ([facility]) G40.109    Anemia of chronic disease D63.8       Pain: 0/10    Speech and Language Treatment  Treatment time: 7703-1774    Subjective: [x] Alert [x] Cooperative     [] Confused     [] Agitated    [x] Lethargic      Objective/Assessment:  Auditory Comprehension:

## 2024-11-05 NOTE — CARE COORDINATION
Case Management Assessment  Initial Evaluation    Date/Time of Evaluation: 11/5/2024 4:26 PM  Assessment Completed by: ALLEN HERNANDEZ RN    If patient is discharged prior to next notation, then this note serves as note for discharge by case management.    Patient Name: Shahrzad Giordano                   YOB: 1970  Diagnosis: Seizure (HCC) [R56.9]  Status epilepticus (HCC) [G40.901]                   Date / Time: 11/1/2024  1:36 AM    Patient Admission Status: Inpatient   Readmission Risk (Low < 19, Mod (19-27), High > 27): Readmission Risk Score: 37.7    Current PCP: Trang Maciel, DO  PCP verified by CM? No    Chart Reviewed: Yes      History Provided by: Child/Family  Patient Orientation: Unable to Assess    Patient Cognition: Alert    Hospitalization in the last 30 days (Readmission):  No    If yes, Readmission Assessment in CM Navigator will be completed.    Advance Directives:      Code Status: Full Code   Patient's Primary Decision Maker is: Legal Next of Kin      Discharge Planning:    Patient lives with:   Type of Home:    Primary Care Giver: Family  Patient Support Systems include: Children   Current Financial resources:    Current community resources: None  Current services prior to admission:              Current DME:              Type of Home Care services:       ADLS  Prior functional level: Assistance with the following:, Shopping, Housework, Cooking  Current functional level: Assistance with the following:, Shopping, Cooking, Housework    PT AM-PAC:   /24  OT AM-PAC:   /24    Family can provide assistance at DC: Yes  Would you like Case Management to discuss the discharge plan with any other family members/significant others, and if so, who?  (Kulwant)  Plans to Return to Present Housing: Yes  Other Identified Issues/Barriers to RETURNING to current housing: no  Potential Assistance needed at discharge:              Potential DME:    Patient expects to discharge to:

## 2024-11-05 NOTE — DISCHARGE INSTR - COC
Continuity of Care Form    Patient Name: Shahrzad Giordano   :  1970  MRN:  [id number]    Admit date:  2024  Discharge date:  ***    Code Status Order: Full Code   Advance Directives:   Advance Care Flowsheet Documentation             Admitting Physician:  Kenn Sánchez DO  PCP: Trang Maciel DO    Discharging Nurse: ***  Discharging Hospital Unit/Room#: 0103/0103-01  Discharging Unit Phone Number: ***    Emergency Contact:   Extended Emergency Contact Information  Primary Emergency Contact: Kulwant Badillo  Home Phone: [phone]  Relation: Child  Preferred language: English   needed? No  Secondary Emergency Contact: Carole Malik  Home Phone: [phone]  Mobile Phone: [phone]  Relation: Child    Past Surgical History:  No past surgical history on file.    Immunization History:     There is no immunization history on file for this patient.    Active Problems:  Patient Active Problem List   Diagnosis Code    Non-insulin dependent type 2 diabetes mellitus ([facility]) E11.9    Normocytic anemia D64.9    Shortness of breath R06.02    ESRF (end stage renal failure) ([facility]) N18.6    Stroke-like episode R29.90    Acute encephalopathy G93.40    Community acquired pneumonia J18.9    History of CAD (coronary artery disease) Z86.79    History of stroke Z86.73    Anxiety F41.9    Altered mental status, unspecified R41.82    Stage 5 chronic kidney disease ([facility]) N18.5    Essential hypertension I10    Uremic encephalopathy G93.49, N19    Noncompliance with medication regimen Z91.148    Acute pulmonary edema J81.0    Systolic dysfunction, left ventricle I51.9    Internal carotid artery stenosis, left I65.22    Iron deficiency anemia D50.9    Acute on chronic HFrEF (heart failure with reduced ejection fraction) ([facility]) I50.23    Atrial fibrillation ([facility]) I48.91    Hyperlipidemia E78.5    Coronary artery disease involving native coronary artery of native heart without angina pectoris I25.10

## 2024-11-05 NOTE — PLAN OF CARE
Problem: Chronic Conditions and Co-morbidities  Goal: Patient's chronic conditions and co-morbidity symptoms are monitored and maintained or improved  11/4/2024 2149 by Grace Campa RN  Outcome: Progressing  11/4/2024 1359 by Josue Elam RN  Outcome: Progressing     Problem: Discharge Planning  Goal: Discharge to home or other facility with appropriate resources  11/4/2024 2149 by Grace Campa RN  Outcome: Progressing  11/4/2024 1359 by Josue Elam RN  Outcome: Progressing     Problem: Safety - Adult  Goal: Free from fall injury  11/4/2024 2149 by Grace Campa RN  Outcome: Progressing  11/4/2024 1359 by Josue Elam RN  Outcome: Progressing     Problem: Pain  Goal: Verbalizes/displays adequate comfort level or baseline comfort level  11/4/2024 2149 by Grace Campa RN  Outcome: Progressing  11/4/2024 1359 by Josue Elam RN  Outcome: Progressing

## 2024-11-05 NOTE — PROGRESS NOTES
10/31/2024  1. Moderate cardiomegaly. Metallic sternotomy sutures and fasteners in addition to metallic vascular clips from prior surgery. Right jugular dialysis catheter with tip in right atrium. 2. Moderate atelectasis/pneumonia both mid and lower lung fields, most severe in the left lung base. Small effusion left side. 3. Overall appearance of chest slightly worse than prior. **This report has been created using voice recognition software.  It may contain minor errors which are inherent in voice recognition technology.** Electronically signed by Dr. Ubaldo Aquino    CT HEAD WO CONTRAST    Result Date: 10/31/2024  1. No acute intracranial hemorrhage 2. Areas of old infarct in the right temporal lobe and right temporo-occipital lobe. 3. Mild diffuse cerebral volume loss. **This report has been created using voice recognition software.  It may contain minor errors which are inherent in voice recognition technology.** Electronically signed by Dr. Cassandra Day      Physical Examination:        General appearance:  alert, cooperative and no distress, [place] speaking  Mental Status:  oriented to person, place and time and flat affect  Lungs:  clear to auscultation bilaterally, normal effort  Heart:  regular rate and rhythm, no murmur  Abdomen:  soft, nontender, nondistended, normal bowel sounds, no masses, hepatomegaly, splenomegaly  Extremities:  no edema, redness, tenderness in the calves  Skin:  no gross lesions, rashes, induration    Assessment:        Hospital Problems             Last Modified POA    * (Principal) Seizure disorder (HCC) 11/4/2024 Yes    Non-insulin dependent type 2 diabetes mellitus ([facility]) 11/3/2024 Yes    ESRF (end stage renal failure) ([facility]) 11/4/2024 Yes    History of CAD (coronary artery disease) 11/3/2024 Yes    History of stroke 11/3/2024 Yes    Essential hypertension 11/2/2024 Yes    Internal carotid artery stenosis, left 11/3/2024 Yes    Atrial fibrillation (HCC) 11/3/2024 Yes     Hyperlipidemia 11/3/2024 Yes    Coronary artery disease involving native coronary artery of native heart without angina pectoris 11/3/2024 Yes    Encephalopathy 11/4/2024 Yes    Seizure (HCC) 11/3/2024 Yes    Chronic heart failure with preserved ejection fraction (HCC) 11/1/2024 Yes    Moderate dementia (HCC) 11/1/2024 Yes    Bacteremia 11/1/2024 Yes    History of type 2 diabetes mellitus 11/2/2024 Yes    Vitamin D deficiency 11/3/2024 Yes    Focal epilepsy (HCC) 11/3/2024 Yes    Anemia of chronic disease 11/4/2024 Yes       Plan:        Acute encephalopathy- likely due to Metabolic issues with uremia.  Patient non-complaint with HD in the past.  DW Dr. Nugent, Neurology, evidence of cerebral atrophy of brain MRI.  Likely not early onset dementia.  No new strokes, will try Risperdal 0.25mg qhs, no signs of infection, ammonia- 28.    Seizures- with hx CVAs- con't Keppra  Hx CVAs- con't ASA, statin, Eliquis 5mg BID  Pyuria- UC negative, ID recommends to monitor off antibiotics  Bacteremia- 1/2 BC positive for coag negative Staph, likely a skin contaminant  HTN- Bp improved, on Coreg, Losartan and Hydralazine  HPL- con't statin  DM2- monitor and control BS with SSI  CAD- con't ASA, statin, Coreg, Losartan, and Eliquis  Chronic HFpEF- on Bumex 1mg daily, appears euvolemic  ESRD on HD- M,W,F, per Nephrology  DVT proph  PT/OT    Encouraged patient to cooperate with nursing and staff so that we can discharge her tomorrow after HD.  She is agreeable, discontinue sitter if she is calmer    [place]yn[place] signed, Med rec started    Corazon Sullivan MD  11/5/2024  7:49 AM

## 2024-11-05 NOTE — PROGRESS NOTES
Renal Progress Note    Patient :  Shahrzad Giordano; 54 y.o. MRN# [id number]  Location:  0103/0103-01  Attending:  Corazon Sullivan MD  Admit Date:  11/1/2024   Hospital Day: 4    Subjective:     Patient was seen and examined.  No new issues reported overnight.    Patient still has positive confusion and has a sitter in room.  She has been currently getting dialysis as per MWF schedule.  BMP results from today reviewed sodium 135, potassium 3.6, chloride 97, bicarb 24, calcium 8.3, BUN 19, creatinine 2.8 mg/dl.  Outpatient Medications:     Medications Prior to Admission: atorvastatin (LIPITOR) 40 MG tablet, Take 1 tablet by mouth daily  aspirin 81 MG EC tablet, Take 1 tablet by mouth daily  apixaban (ELIQUIS) 2.5 MG TABS tablet, Take 1 tablet by mouth 2 times daily  bumetanide (BUMEX) 1 MG tablet, Take 1 tablet by mouth daily  glipiZIDE (GLUCOTROL) 10 MG tablet, Take 1 tablet by mouth daily  sertraline (ZOLOFT) 50 MG tablet, Take 1 tablet by mouth every evening  carvedilol (COREG) 25 MG tablet, Take 1 tablet by mouth 2 times daily (with meals)  sevelamer (RENVELA) 800 MG tablet, Take 1 tablet by mouth 3 times daily (with meals)  hydrALAZINE (APRESOLINE) 25 MG tablet, Take 1 tablet by mouth 3 times daily  folic acid (FOLVITE) 1 MG tablet, Take 1 tablet by mouth daily  metOLazone (ZAROXOLYN) 2.5 MG tablet, Take 1 tablet by mouth daily  ferrous sulfate (IRON 325) 325 (65 Fe) MG tablet, Take 1 tablet by mouth daily (with breakfast)  clopidogrel (PLAVIX) 75 MG tablet, Take 1 tablet by mouth daily  Multiple Vitamins-Minerals (CENTRUM SILVER 50+WOMEN PO), Take 1 tablet by mouth daily  losartan (COZAAR) 100 MG tablet, Take 1 tablet by mouth daily    Current Medications:     Scheduled Meds:    epoetin  3,000 Units IntraVENous Once per day on Monday Wednesday Friday    And    epoetin  2,000 Units IntraVENous Once per day on Monday Wednesday Friday    losartan  100 mg Oral Daily    Vitamin D  1,000 Units Oral Daily     does not any much questions. NAD.  SKIN:  No rashes, good skin turgor.  Extremities:  No edema.    Labs:       Recent Labs     11/03/24  1438 11/04/24 0519 11/05/24 0454   WBC 6.5 6.7 7.1   RBC 3.51* 3.35* 3.55*   HGB 9.3* 9.0* 9.4*   HCT 31.0* 29.9* 30.9*   MCV 88.3 89.3 87.0   MCH 26.5 26.9 26.5   MCHC 30.0 30.1 30.4   RDW 15.8* 15.8* 15.9*    296 317   MPV 10.0 9.7 10.1      BMP:   Recent Labs     11/03/24  0401 11/04/24 0519 11/05/24 0454    138 135*   K 4.2 3.8 3.6*    101 97*   CO2 23 22 24   BUN 29* 37* 19   CREATININE 3.8* 4.2* 2.8*   GLUCOSE 134* 104* 136*   CALCIUM 8.3* 8.2* 8.3*      Hep BsAg:         Lab Results   Component Value Date/Time    HEPBSAG Negative 10/09/2024 06:54 AM     Hep C AB:          Lab Results   Component Value Date/Time    HEPCAB Negative 10/08/2024 03:34 AM       Urinalysis/Chemistries:      Lab Results   Component Value Date/Time    NITRU NEGATIVE 11/01/2024 02:08 AM    COLORU Yellow 11/01/2024 02:08 AM    PHUR 8.5 11/01/2024 02:08 AM    LABCAST NONE SEEN 10/31/2024 01:00 PM    LABCAST NONE SEEN 10/31/2024 01:00 PM    WBCUA 20 TO 50 11/01/2024 02:08 AM    RBCUA 2 TO 5 11/01/2024 02:08 AM    RBCUA NONE SEEN 10/31/2024 01:00 PM    YEAST NONE SEEN 10/31/2024 01:00 PM    BACTERIA MANY 11/01/2024 02:08 AM    LEUKOCYTESUR SMALL 11/01/2024 02:08 AM    UROBILINOGEN Normal 11/01/2024 02:08 AM    BILIRUBINUR NEGATIVE 11/01/2024 02:08 AM    BLOODU NEGATIVE 10/31/2024 01:00 PM    GLUCOSEU NEGATIVE 11/01/2024 02:08 AM    KETUA NEGATIVE 11/01/2024 02:08 AM     Radiology:     Reviewed.     Assessment:     ESRD on HD on MWF schedule, currently does not have an outpatient dialysis unit and gets HD through ED.  She is originally from [place] and had been getting dialysis in [place] and is temporarily here in [place] with her son.  Dementia.  Confusion.  Seizure.  Anemia of chronic disease.  History of CVA.  Diabetes type 2.  A-fib.  History of noncompliance.    Plan:   No acute need

## 2024-11-05 NOTE — CONSULTS
General Neurology Progress Note  [facility]            Date:   11/5/2024  Patient name:  Shahrzad Giordano  Date of admission:  11/1/2024  1:36 AM  MRN:   [id number]  Account:  [id number]  YOB: 1970  PCP:    Trang Maciel DO  Room:   [address] [place] [address]  Code Status:    Full Code  Chief Complaint: No chief complaint on file.    Allergies: Patient has no known allergies.  Interval history      Patient was seen and examined at bedside this morning. Labs, chart, and VS reviewed. Agitated and combative yesterday during HD session, was stopped early.     Review of Systems   Unable to perform ROS: Mental status change     Neurological Exam   BP (!) 144/62   Pulse 67   Temp 97.4 °F (36.3 °C) (Axillary)   Resp 14   Wt 73.6 kg (162 lb 4.1 oz)   SpO2 94%   BMI 29.68 kg/m²     Gen: Laying in bed, no distress  CVS: palpable pulses throughout   CHEST: No signs of resp distress, on room air  ABD: Soft, NTTP  Neuro:  MS: awake, conversational, not fully oriented   Language: frequently perseverates   CNs: Pupils b/l equal 3mm, reactive, EOMI seems intact, face symmetric, tongue midline.  Motor: intact strength throughout   Sensory: reacts to pain in all extremities  Reflexes: hyporeflexic throughout   Coordination no observed nystagmus or appendicular ataxia on spontaneous movements     Investigations   Laboratory Testing:  Recent Results (from the past 24 hour(s))   Ammonia    Collection Time: 11/04/24 10:33 AM   Result Value Ref Range    Ammonia 28 11 - 51 umol/L   POC Glucose Fingerstick    Collection Time: 11/04/24 11:32 AM   Result Value Ref Range    POC Glucose 167 (H) 65 - 105 mg/dL   POC Glucose Fingerstick    Collection Time: 11/04/24  4:20 PM   Result Value Ref Range    POC Glucose 174 (H) 65 - 105 mg/dL   POC Glucose Fingerstick    Collection Time: 11/04/24  7:57 PM   Result Value Ref Range    POC Glucose 198 (H) 65 - 105 mg/dL   Basic Metabolic Panel    Collection

## 2024-11-06 VITALS
SYSTOLIC BLOOD PRESSURE: 143 MMHG | OXYGEN SATURATION: 99 % | BODY MASS INDEX: 29.96 KG/M2 | DIASTOLIC BLOOD PRESSURE: 65 MMHG | HEART RATE: 78 BPM | RESPIRATION RATE: 16 BRPM | TEMPERATURE: 98.1 F | WEIGHT: 163.8 LBS

## 2024-11-06 LAB
ANION GAP SERPL CALCULATED.3IONS-SCNC: 13 MMOL/L (ref 9–16)
BUN SERPL-MCNC: 30 MG/DL (ref 6–20)
CALCIUM SERPL-MCNC: 8 MG/DL (ref 8.6–10.4)
CHLORIDE SERPL-SCNC: 97 MMOL/L (ref 98–107)
CO2 SERPL-SCNC: 22 MMOL/L (ref 20–31)
CREAT SERPL-MCNC: 3.7 MG/DL (ref 0.6–0.9)
GFR, ESTIMATED: 14 ML/MIN/1.73M2
GLUCOSE BLD-MCNC: 103 MG/DL (ref 65–105)
GLUCOSE BLD-MCNC: 108 MG/DL (ref 65–105)
GLUCOSE SERPL-MCNC: 102 MG/DL (ref 74–99)
MAGNESIUM SERPL-MCNC: 1.7 MG/DL (ref 1.6–2.6)
MICROORGANISM SPEC CULT: NORMAL
MICROORGANISM SPEC CULT: NORMAL
POTASSIUM SERPL-SCNC: 3.5 MMOL/L (ref 3.7–5.3)
SERVICE CMNT-IMP: NORMAL
SERVICE CMNT-IMP: NORMAL
SODIUM SERPL-SCNC: 132 MMOL/L (ref 136–145)
SPECIMEN DESCRIPTION: NORMAL
SPECIMEN DESCRIPTION: NORMAL
T GONDII IGG SER-ACNC: <0.5 IU/ML
T GONDII IGM SER-ACNC: 0.32 INDEX (ref 0–0.9)

## 2024-11-06 PROCEDURE — 99232 SBSQ HOSP IP/OBS MODERATE 35: CPT | Performed by: STUDENT IN AN ORGANIZED HEALTH CARE EDUCATION/TRAINING PROGRAM

## 2024-11-06 PROCEDURE — 83735 ASSAY OF MAGNESIUM: CPT

## 2024-11-06 PROCEDURE — 80048 BASIC METABOLIC PNL TOTAL CA: CPT

## 2024-11-06 PROCEDURE — 90935 HEMODIALYSIS ONE EVALUATION: CPT

## 2024-11-06 PROCEDURE — 99232 SBSQ HOSP IP/OBS MODERATE 35: CPT | Performed by: INTERNAL MEDICINE

## 2024-11-06 PROCEDURE — 6370000000 HC RX 637 (ALT 250 FOR IP)

## 2024-11-06 PROCEDURE — 82947 ASSAY GLUCOSE BLOOD QUANT: CPT

## 2024-11-06 PROCEDURE — P9047 ALBUMIN (HUMAN), 25%, 50ML: HCPCS | Performed by: STUDENT IN AN ORGANIZED HEALTH CARE EDUCATION/TRAINING PROGRAM

## 2024-11-06 PROCEDURE — 2580000003 HC RX 258

## 2024-11-06 PROCEDURE — 6360000002 HC RX W HCPCS: Performed by: STUDENT IN AN ORGANIZED HEALTH CARE EDUCATION/TRAINING PROGRAM

## 2024-11-06 PROCEDURE — 6360000002 HC RX W HCPCS: Performed by: INTERNAL MEDICINE

## 2024-11-06 PROCEDURE — 36415 COLL VENOUS BLD VENIPUNCTURE: CPT

## 2024-11-06 RX ORDER — RISPERIDONE 0.5 MG/1
0.5 TABLET ORAL NIGHTLY
Qty: 30 TABLET | Refills: 0 | Status: SHIPPED | OUTPATIENT
Start: 2024-11-06

## 2024-11-06 RX ORDER — LACOSAMIDE 200 MG/1
200 TABLET ORAL 2 TIMES DAILY
Qty: 60 TABLET | Refills: 0 | Status: SHIPPED | OUTPATIENT
Start: 2024-11-06 | End: 2024-12-06

## 2024-11-06 RX ORDER — RISPERIDONE 0.5 MG/1
0.5 TABLET ORAL NIGHTLY
Status: DISCONTINUED | OUTPATIENT
Start: 2024-11-06 | End: 2024-11-06 | Stop reason: HOSPADM

## 2024-11-06 RX ORDER — LEVETIRACETAM 1000 MG/1
1000 TABLET ORAL DAILY
Qty: 30 TABLET | Refills: 0 | Status: SHIPPED | OUTPATIENT
Start: 2024-11-06

## 2024-11-06 RX ORDER — HYDRALAZINE HYDROCHLORIDE 50 MG/1
50 TABLET, FILM COATED ORAL 3 TIMES DAILY
Qty: 90 TABLET | Refills: 0 | Status: SHIPPED | OUTPATIENT
Start: 2024-11-06

## 2024-11-06 RX ORDER — VITAMIN B COMPLEX
1000 TABLET ORAL DAILY
Qty: 30 TABLET | Refills: 0 | Status: SHIPPED | OUTPATIENT
Start: 2024-11-06

## 2024-11-06 RX ORDER — LORAZEPAM 0.5 MG/1
0.5 TABLET ORAL EVERY 8 HOURS PRN
Qty: 15 TABLET | Refills: 0 | Status: SHIPPED | OUTPATIENT
Start: 2024-11-06 | End: 2024-11-11

## 2024-11-06 RX ORDER — LACOSAMIDE 200 MG/1
200 TABLET ORAL 2 TIMES DAILY
Qty: 60 TABLET | Refills: 0
Start: 2024-11-06 | End: 2024-11-06

## 2024-11-06 RX ORDER — ALBUMIN (HUMAN) 12.5 G/50ML
25 SOLUTION INTRAVENOUS
Status: DISCONTINUED | OUTPATIENT
Start: 2024-11-06 | End: 2024-11-06 | Stop reason: HOSPADM

## 2024-11-06 RX ORDER — LEVETIRACETAM 500 MG/1
500 TABLET ORAL
Qty: 15 TABLET | Refills: 1 | Status: SHIPPED | OUTPATIENT
Start: 2024-11-06

## 2024-11-06 RX ADMIN — ERYTHROPOIETIN 3000 UNITS: 3000 INJECTION, SOLUTION INTRAVENOUS; SUBCUTANEOUS at 11:33

## 2024-11-06 RX ADMIN — ERYTHROPOIETIN 2000 UNITS: 2000 INJECTION, SOLUTION INTRAVENOUS; SUBCUTANEOUS at 11:33

## 2024-11-06 RX ADMIN — HEPARIN SODIUM 1600 UNITS: 1000 INJECTION INTRAVENOUS; SUBCUTANEOUS at 11:41

## 2024-11-06 RX ADMIN — HEPARIN SODIUM 1600 UNITS: 1000 INJECTION INTRAVENOUS; SUBCUTANEOUS at 11:40

## 2024-11-06 RX ADMIN — LACOSAMIDE 200 MG: 100 TABLET, FILM COATED ORAL at 18:26

## 2024-11-06 RX ADMIN — SODIUM CHLORIDE, PRESERVATIVE FREE 10 ML: 5 INJECTION INTRAVENOUS at 09:00

## 2024-11-06 RX ADMIN — LORAZEPAM 1 MG: 2 INJECTION INTRAMUSCULAR; INTRAVENOUS at 09:32

## 2024-11-06 ASSESSMENT — PAIN SCALES - GENERAL: PAINLEVEL_OUTOF10: 0

## 2024-11-06 NOTE — DISCHARGE SUMMARY
ELIQUIS  Take 1 tablet by mouth 2 times daily  What changed:   medication strength  how much to take     hydrALAZINE 50 MG tablet  Commonly known as: APRESOLINE  Take 1 tablet by mouth 3 times daily  What changed:   medication strength  how much to take            CONTINUE taking these medications      aspirin 81 MG EC tablet  Take 1 tablet by mouth daily     atorvastatin 40 MG tablet  Commonly known as: LIPITOR  Take 1 tablet by mouth daily     bumetanide 1 MG tablet  Commonly known as: BUMEX     carvedilol 25 MG tablet  Commonly known as: COREG     CENTRUM SILVER 50+WOMEN PO     ferrous sulfate 325 (65 Fe) MG tablet  Commonly known as: IRON 325     folic acid 1 MG tablet  Commonly known as: FOLVITE     glipiZIDE 10 MG tablet  Commonly known as: GLUCOTROL     losartan 100 MG tablet  Commonly known as: COZAAR     sertraline 50 MG tablet  Commonly known as: ZOLOFT     sevelamer 800 MG tablet  Commonly known as: RENVELA            STOP taking these medications      clopidogrel 75 MG tablet  Commonly known as: PLAVIX     metOLazone 2.5 MG tablet  Commonly known as: ZAROXOLYN               Where to Get Your Medications        These medications were sent to [facility]/pharmacy #[id number] - [place], [address] -  [phone] - F [phone]  [address] [place] [address]      Phone: [phone]   apixaban 5 MG Tabs tablet  hydrALAZINE 50 MG tablet  levETIRAcetam 1000 MG tablet  levETIRAcetam 500 MG tablet  risperiDONE 0.5 MG tablet  Vitamin D 25 MCG (1000 UT) Tabs tablet       You can get these medications from any pharmacy    Bring a paper prescription for each of these medications  lacosamide 200 MG tablet  LORazepam 0.5 MG tablet         No discharge procedures on file.    Time Spent on discharge is  32 mins in patient examination, evaluation, counseling as well as medication reconciliation, prescriptions for required medications, discharge plan and follow up.    Electronically signed by   Corazon Sullivan

## 2024-11-06 NOTE — PROGRESS NOTES
Dialysis Post Treatment Note  Vitals:    11/06/24 1213   BP: (!) 143/65   Pulse: 78   Resp: 16   Temp:    SpO2:      Pre-Weight = 79.6 kg  Post-weight = Weight - Scale: 74.3 kg (163 lb 12.8 oz)  Total Liters Processed = Blood Volume Processed (Liters): 76.57 L  Rinseback Volume (mL) = Rinseback Volume (ml): 220 ml  Net Removal (mL) =  1029  Patient's dry weight=  Type of access used= Right perm cath  Length of treatment=208 mins    Pt tolerated tx well, though had periods of mild to mod agitation throughout; sitter w/ pt during tx; 1A RN aware of pt's status throughout tx

## 2024-11-06 NOTE — CARE COORDINATION
Transitional Planning  Plan is home with son, dialysis MWF at [facility]. Plan was dc today, but patient having increased confusion and is combative.      430 pm Spoke with BUCK Salas, states son is aware of dc.  Plans on providing transportation.     Discharge Report    [facility]  Clinical Case Management Department  Written by: Fela Obrien RN    Patient Name: Shahrzad Pasquale  Attending Provider: No att. providers found  Admit Date: 2024  1:36 AM  MRN: [id number]  Account: [id number]                     : 1970  Discharge Date: 2024      Disposition: home    Fela Obrien RN

## 2024-11-06 NOTE — PLAN OF CARE
Problem: Chronic Conditions and Co-morbidities  Goal: Patient's chronic conditions and co-morbidity symptoms are monitored and maintained or improved  11/6/2024 0013 by Mae Winn RN  Outcome: Progressing  11/5/2024 1411 by Josue Elam RN  Outcome: Progressing     Problem: Discharge Planning  Goal: Discharge to home or other facility with appropriate resources  11/6/2024 0013 by Mae Winn RN  Outcome: Progressing  11/5/2024 1411 by Josue Elam RN  Outcome: Progressing     Problem: Safety - Adult  Goal: Free from fall injury  11/6/2024 0013 by Mae Winn RN  Outcome: Progressing  11/5/2024 1411 by Josue Elam RN  Outcome: Progressing     Problem: Pain  Goal: Verbalizes/displays adequate comfort level or baseline comfort level  11/6/2024 0013 by Mae Winn RN  Outcome: Progressing  11/5/2024 1411 by Josue Elam RN  Outcome: Progressing

## 2024-11-06 NOTE — PROGRESS NOTES
Renal Progress Note    Patient :  Shahrzad Giordano; 54 y.o. MRN# [id number]  Location:  0103/0103-01  Attending:  Corazon Sullivan MD  Admit Date:  11/1/2024   Hospital Day: 5    Subjective:     Patient was seen and examined at bedside. She is doing facetime with her daughter. She has a one to one sitter. She is currently getting HD and initially while she was agitate and combative her tunneled catheter was not functioning appropriately however after calming down it is not functioning well. Bp on the low side so UF is stopped and giving albumin.    Outpatient Medications:     Medications Prior to Admission: atorvastatin (LIPITOR) 40 MG tablet, Take 1 tablet by mouth daily  aspirin 81 MG EC tablet, Take 1 tablet by mouth daily  [DISCONTINUED] apixaban (ELIQUIS) 2.5 MG TABS tablet, Take 1 tablet by mouth 2 times daily  bumetanide (BUMEX) 1 MG tablet, Take 1 tablet by mouth daily  glipiZIDE (GLUCOTROL) 10 MG tablet, Take 1 tablet by mouth daily  sertraline (ZOLOFT) 50 MG tablet, Take 1 tablet by mouth every evening  carvedilol (COREG) 25 MG tablet, Take 1 tablet by mouth 2 times daily (with meals)  sevelamer (RENVELA) 800 MG tablet, Take 1 tablet by mouth 3 times daily (with meals)  folic acid (FOLVITE) 1 MG tablet, Take 1 tablet by mouth daily  ferrous sulfate (IRON 325) 325 (65 Fe) MG tablet, Take 1 tablet by mouth daily (with breakfast)  Multiple Vitamins-Minerals (CENTRUM SILVER 50+WOMEN PO), Take 1 tablet by mouth daily  losartan (COZAAR) 100 MG tablet, Take 1 tablet by mouth daily  [DISCONTINUED] hydrALAZINE (APRESOLINE) 25 MG tablet, Take 1 tablet by mouth 3 times daily  [DISCONTINUED] metOLazone (ZAROXOLYN) 2.5 MG tablet, Take 1 tablet by mouth daily  [DISCONTINUED] clopidogrel (PLAVIX) 75 MG tablet, Take 1 tablet by mouth daily    Current Medications:     Scheduled Meds:    risperiDONE  0.5 mg Oral Nightly    epoetin  3,000 Units IntraVENous Once per day on Monday Wednesday Friday    And    epoetin  2,000

## 2024-11-06 NOTE — PROGRESS NOTES
Specimen: Blood Updated: 11/06/24 0218     Specimen Description .BLOOD     Special Requests L HAND 1ML     Culture NO GROWTH 5 DAYS    Culture, Blood 1 [[id number]] Collected: 11/01/24 0240    Order Status: Completed Specimen: Blood Updated: 11/06/24 0217     Specimen Description .BLOOD     Special Requests l arm 1ml     Culture NO GROWTH 5 DAYS    Culture, Blood 1 [[id number]] Collected: 10/31/24 1323    Order Status: Completed Specimen: Blood Updated: 11/05/24 1406     Blood Culture, Routine No growth 24 hours. No growth 48 hours. No growth at 5 days    Narrative:      Source: blood-Adult-exceeds >5.7oz/set optimal vol.       Site: Peripheral Vein            Current Antibiotics: not stated    Culture, Blood 2 [[id number]] Collected: 10/31/24 1316    Order Status: Completed Specimen: Blood Updated: 11/05/24 1406     Blood Culture, Routine No growth 24 hours. No growth 48 hours. No growth at 5 days    Narrative:      Source: blood-Adult-optimal 5.5-5.7oz/set volume       Site: Peripheral Vein            Current Antibiotics: not stated    Culture, Urine [[id number]]  (Abnormal) Collected: 10/31/24 1300    Order Status: Completed Specimen: Urine Updated: 11/01/24 0941     Urine Culture, Routine No significant pathogens isolated. Growth likely represents skin jose or distal urethral jose.     Organism Growth of Contaminants    Narrative:      Source: urine       Site: clean void          Current Antibiotics: not stated    MRSA by PCR [[id number]] Collected: 10/23/24 1945    Order Status: Completed Specimen: Nares Updated: 10/24/24 1005     MRSA SCREEN RT-PCR NEGATIVE     Comment: No MRSA detected by Real Time - Polymerase Chain Reaction.  Specimen Source:  Nares  Performed at [facility] [address] [place] [address]                   Medical Decision Making-Other:     Note:    Thank you for allowing us to participate in the care of this patient. Please call with questions. Evaluation of this patient was  performed under supervision of the attending.    BLAKE COOKSEY, MS4          ATTESTATION:    I have discussed the case, including pertinent history and exam findings with the medical resident. I have evaluated the  History, physical findings and pictures of the patient and the key elements of the encounter have been performed by me. I have reviewed the laboratory data, other diagnostic studies and discussed them with the medical resident. I have updated the medical record where necessary.    I agree with the assessment, plan and orders as documented by the medical resident and I have modified them as necessary.     Elements of Medical Decision Making:  Note: I have independently performed the steps listed below as part of the medical decision making and evaluation.   Examined and discussed with patient in Bulgarian  UTI  Not confirmed  Bacteremia  Coagulase negative Staphylococci . Likely contaminant  Seizures   Early dementia  Labs, medications, radiologic studies were reviewed with personal review of films  Radiologic studies  Lab work  Cultures  Blood : Coagulase negative Staphylococci   Serologies for dementia; negative   Large amounts of data were reviewed  Progressive dementia with aggressive behavior  ESRD requiring HD  Discussed with nursing Staff, Discharge planner  Dr Sullivan  Infection Control and Prevention measures reviewed  Universal precaution  All prior entries were reviewed  IM , Neurology notes reviewed  Administer medications as ordered  Monitor off antibiotics   Prognosis:   Guarded  Discharge planning reviewed  Follow up as outpatient.    Isai Oliveira MD.    11/6/2024

## 2024-11-06 NOTE — PROGRESS NOTES
Dialysis Time Out  To be done by RN and tech or 2 RNs  Staff Names Veronica RN / Johnny RN    [x]  Identity of the patient using 2 patient identifiers  [x]  Consent for treatment  [x]  Equipment-proper machine and dialyzer  [x]  B-Hep B status (neg 10/9/24)  [x]  Orders- to include bath, blood flow, dialyzer, time and fluid removal  [x]  Access-Correct site and in working order  [x]  Time for patient to ask questions.

## 2024-11-06 NOTE — CARE COORDINATION
Called pt's son, Kulwant, to discuss dialysis. Noted in [facility]'s nephrologist  (Dr Osborn) note that dialysis was arranged thru their ED/IP dialysis unit.    Per son, he stated pt has been in [place] for 2m and she gets her dialysis at [facility] d/t insurance issues. He stated the plan was to go there MWF at 9:00a, however, she had only had one session prior to being admitted to [facility].     Called [facility] Dialysis [facility] to confirm this is the plan. Spoke with Savannah and she will have Dr Osborn contact  to confirm this is the plan    1100 - Addendum  Received call back from Savannah and she spoke with Dr Osborn and he would like  to call Sidra Leblanc in case mgmt. Called and left message.    Received c/b from Sidra. She stated pt's insurance will not cover [facility] or [facility] in [place] and pt has to go thru the ED to get her dialysis. She stated pt/son plan to return to [place] so they were not able to apply for [facility]. She also stated son works for a [employer] in [place] that also has locations in [place] and [place] and that son has stated they will be coming to [place] in Dec.     Plan remains for pt to go to [facility] ED for her dialysis.

## 2024-11-06 NOTE — PROGRESS NOTES
[facility]  Office: [phone]  Kevin Hernandez DO, Lg Ibarra DO, Sergio Porter DO, Nicolas Zendejas DO, Fatou Jones MD, Corazon Sullivan MD, Lala Wang MD, Vanessa Smith MD,  Phani Golden MD, Joyce Vann MD, Sophy Irizarry MD,  Joelle Sofia DO, Wilfred Starr MD, Dean Pabon MD, Fidel Hernandez DO, Randa Swift MD,  Kenn Sánchez DO, Aisha Perez MD, Africa Villafana MD, Mone Valero MD, Maisha Walker MD,  Popeye Daniel MD, Marilyn Woodson MD, Kade Stern MD, Laz Stern MD, Haim Alan MD, Clinton Cabrera MD, Salomon Duong DO, Michael Bonds MD, Shirley Waterhouse, CNP,  Adrienne Landeros CNP, Salomon Tovar, RICHIE,  Ana Wade, SUSIE, Urmila Wong, CNP, Gissel Vera, CNP, Bing Bloom, CNP, Zoraida Schulz, CNP, Shannon Sneed PA-C, JACINTO BobC, Olivia Selby, RICHIE, Gonzalo Bailey, CNP,  Alicia Dover, CNP, Génesis Aguila, CNP,  Suma Sheriff, RICHIE, Judy Castellanos, CNP         [facility]   IN-PATIENT SERVICE   [facility]    Progress Note    11/6/2024    9:37 AM    Name:   Shahrzad Giordano  MRN:     [id number]     Acct:      [id number]   Room:   0103/0103-01   Day:  5  Admit Date:  11/1/2024  1:36 AM    PCP:   Trang Maciel DO  Code Status:  Full Code    Subjective:     C/C:  seizure    Interval History Status: improved.      Patient was combative and pulling at lines this morning in HD, security was called.  She has a sitter, the nurse had to place soft wrist restraints again.      She spoke to me with the help of the .  She denies any pain, she agrees not to pull at lines and be cooperative if we remove her restraints.   Her son is going to take her home today after work since her health insurance won't cover a SNF    Brief History:     Per my partner:  \"This is a 54-year-old female with a significant past medical history of hypertension, type 2 diabetes mellitus, ESRD on hemodialysis,  epoetin  2,000 Units IntraVENous Once per day on     losartan  100 mg Oral Daily    Vitamin D  1,000 Units Oral Daily    hydrALAZINE  50 mg Oral TID    aspirin  81 mg Oral Daily    atorvastatin  40 mg Oral Daily    bumetanide  1 mg Oral Daily    folic acid  1 mg Oral Daily    sertraline  50 mg Oral QPM    sodium chloride flush  5-40 mL IntraVENous 2 times per day    insulin lispro  0-8 Units SubCUTAneous 4x Daily AC & HS    carvedilol  25 mg Oral BID WC    apixaban  5 mg Oral BID    levETIRAcetam  500 mg Oral Once per day on     levETIRAcetam  1,000 mg Oral Daily    lacosamide  200 mg Oral BID     Continuous Infusions:    sodium chloride      dextrose       PRN Meds: haloperidol lactate, LORazepam, sodium chloride flush, sodium chloride, ondansetron **OR** ondansetron, polyethylene glycol, acetaminophen **OR** acetaminophen, glucose, dextrose bolus **OR** dextrose bolus, glucagon (rDNA), dextrose, heparin (porcine), heparin (porcine)    Data:     Past Medical History:   has a past medical history of Chronic kidney disease, Diabetes mellitus (HCC), and Hyperlipidemia.    Social History:   reports that she has never smoked. She has never used smokeless tobacco. She reports that she does not drink alcohol and does not use drugs.     Family History: No family history on file.    Vitals:  BP (!) 169/79   Pulse 67   Temp 98.1 °F (36.7 °C)   Resp 16   Wt 76.9 kg (169 lb 8.5 oz)   SpO2 99%   BMI 31.01 kg/m²   Temp (24hrs), Av °F (36.7 °C), Min:97.7 °F (36.5 °C), Max:98.2 °F (36.8 °C)    Recent Labs     24  1238 24  1543 24  1933 24  0816   POCGLU 203* 131* 234* 108*       I/O (24Hr):  No intake or output data in the 24 hours ending 24 0937      Labs:  Hematology:  Recent Labs     24  1438 24  0519 24  0454   WBC 6.5 6.7 7.1   RBC 3.51* 3.35* 3.55*   HGB 9.3* 9.0* 9.4*   HCT 31.0* 29.9* 30.9*   MCV 88.3 89.3 87.0   MCH